# Patient Record
Sex: FEMALE | Race: WHITE | NOT HISPANIC OR LATINO | Employment: FULL TIME | URBAN - METROPOLITAN AREA
[De-identification: names, ages, dates, MRNs, and addresses within clinical notes are randomized per-mention and may not be internally consistent; named-entity substitution may affect disease eponyms.]

---

## 2017-01-06 ENCOUNTER — PATIENT MESSAGE (OUTPATIENT)
Dept: INTERNAL MEDICINE | Facility: CLINIC | Age: 42
End: 2017-01-06

## 2017-01-06 RX ORDER — SULFAMETHOXAZOLE AND TRIMETHOPRIM 800; 160 MG/1; MG/1
1 TABLET ORAL 2 TIMES DAILY
Qty: 14 TABLET | Refills: 0 | Status: SHIPPED | OUTPATIENT
Start: 2017-01-06 | End: 2017-02-06 | Stop reason: SDUPTHER

## 2017-01-06 NOTE — TELEPHONE ENCOUNTER
Called and spoke with the patient. She states it burns when she uses the rest room and also frequency.

## 2017-01-13 RX ORDER — SUMATRIPTAN SUCCINATE 100 MG/1
100 TABLET ORAL
Qty: 20 TABLET | Refills: 3 | Status: SHIPPED | OUTPATIENT
Start: 2017-01-13 | End: 2017-04-04 | Stop reason: SDUPTHER

## 2017-01-13 RX ORDER — OXYCODONE AND ACETAMINOPHEN 10; 325 MG/1; MG/1
1 TABLET ORAL EVERY 6 HOURS PRN
Qty: 60 TABLET | Refills: 0 | Status: SHIPPED | OUTPATIENT
Start: 2017-01-13 | End: 2017-02-08 | Stop reason: SDUPTHER

## 2017-01-17 RX ORDER — ESTRADIOL 2 MG/1
2 TABLET ORAL DAILY
Qty: 90 TABLET | Refills: 4 | Status: SHIPPED | OUTPATIENT
Start: 2017-01-17 | End: 2018-01-24 | Stop reason: SDUPTHER

## 2017-01-17 RX ORDER — IBUPROFEN 800 MG/1
TABLET ORAL
Qty: 90 TABLET | Refills: 0 | Status: SHIPPED | OUTPATIENT
Start: 2017-01-17 | End: 2017-02-14 | Stop reason: SDUPTHER

## 2017-01-26 RX ORDER — PROMETHAZINE HYDROCHLORIDE 25 MG/1
TABLET ORAL
Qty: 30 TABLET | Refills: 3 | Status: SHIPPED | OUTPATIENT
Start: 2017-01-26 | End: 2017-08-29 | Stop reason: SDUPTHER

## 2017-02-06 RX ORDER — SULFAMETHOXAZOLE AND TRIMETHOPRIM 800; 160 MG/1; MG/1
TABLET ORAL
Qty: 14 TABLET | Refills: 0 | Status: SHIPPED | OUTPATIENT
Start: 2017-02-06 | End: 2017-03-24 | Stop reason: SDUPTHER

## 2017-02-08 RX ORDER — OXYCODONE AND ACETAMINOPHEN 10; 325 MG/1; MG/1
1 TABLET ORAL EVERY 6 HOURS PRN
Qty: 60 TABLET | Refills: 0 | Status: SHIPPED | OUTPATIENT
Start: 2017-02-08 | End: 2017-03-07 | Stop reason: SDUPTHER

## 2017-02-08 RX ORDER — BUTALBITAL, ACETAMINOPHEN AND CAFFEINE 50; 325; 40 MG/1; MG/1; MG/1
1 TABLET ORAL EVERY 6 HOURS PRN
Qty: 90 TABLET | Refills: 0 | Status: SHIPPED | OUTPATIENT
Start: 2017-02-08 | End: 2018-02-26 | Stop reason: SDUPTHER

## 2017-02-09 ENCOUNTER — PATIENT MESSAGE (OUTPATIENT)
Dept: INTERNAL MEDICINE | Facility: CLINIC | Age: 42
End: 2017-02-09

## 2017-02-09 ENCOUNTER — TELEPHONE (OUTPATIENT)
Dept: INTERNAL MEDICINE | Facility: CLINIC | Age: 42
End: 2017-02-09

## 2017-02-09 NOTE — TELEPHONE ENCOUNTER
Called and advised the patient the Rx was ready for  and there was no answer lmom. Also sent a message to the portal/

## 2017-02-14 RX ORDER — IBUPROFEN 800 MG/1
800 TABLET ORAL 3 TIMES DAILY
Qty: 90 TABLET | Refills: 3 | Status: SHIPPED | OUTPATIENT
Start: 2017-02-14 | End: 2017-06-10 | Stop reason: SDUPTHER

## 2017-02-14 RX ORDER — PROPRANOLOL HYDROCHLORIDE 80 MG/1
80 TABLET ORAL DAILY
Qty: 90 TABLET | Refills: 4 | Status: SHIPPED | OUTPATIENT
Start: 2017-02-14 | End: 2017-05-03 | Stop reason: SDUPTHER

## 2017-02-14 RX ORDER — OMEPRAZOLE 40 MG/1
40 CAPSULE, DELAYED RELEASE ORAL EVERY MORNING
Qty: 90 CAPSULE | Refills: 4 | Status: SHIPPED | OUTPATIENT
Start: 2017-02-14 | End: 2017-05-03 | Stop reason: SDUPTHER

## 2017-03-07 RX ORDER — PROMETHAZINE HYDROCHLORIDE 25 MG/1
25 SUPPOSITORY RECTAL EVERY 6 HOURS PRN
Qty: 30 SUPPOSITORY | Refills: 2 | Status: SHIPPED | OUTPATIENT
Start: 2017-03-07 | End: 2018-01-31 | Stop reason: SDUPTHER

## 2017-03-07 RX ORDER — OXYCODONE AND ACETAMINOPHEN 10; 325 MG/1; MG/1
1 TABLET ORAL EVERY 6 HOURS PRN
Qty: 60 TABLET | Refills: 0 | Status: SHIPPED | OUTPATIENT
Start: 2017-03-07 | End: 2017-04-04 | Stop reason: SDUPTHER

## 2017-03-07 RX ORDER — CLONAZEPAM 1 MG/1
1 TABLET ORAL 3 TIMES DAILY
Qty: 90 TABLET | Refills: 2 | Status: SHIPPED | OUTPATIENT
Start: 2017-03-07 | End: 2017-05-03 | Stop reason: SDUPTHER

## 2017-03-08 RX ORDER — BUTALBITAL, ACETAMINOPHEN AND CAFFEINE 50; 325; 40 MG/1; MG/1; MG/1
TABLET ORAL
Qty: 90 TABLET | Refills: 0 | Status: SHIPPED | OUTPATIENT
Start: 2017-03-08 | End: 2017-04-25 | Stop reason: SDUPTHER

## 2017-03-17 ENCOUNTER — TELEPHONE (OUTPATIENT)
Dept: INTERNAL MEDICINE | Facility: CLINIC | Age: 42
End: 2017-03-17

## 2017-03-17 NOTE — TELEPHONE ENCOUNTER
----- Message from Annelise Morel MA sent at 3/16/2017  3:16 PM CDT -----  Prior auth requested on butalbital/acetaminophen/caff tabs #90    Insurance phone   Id# [id number]

## 2017-03-24 RX ORDER — SULFAMETHOXAZOLE AND TRIMETHOPRIM 800; 160 MG/1; MG/1
TABLET ORAL
Qty: 14 TABLET | Refills: 0 | Status: SHIPPED | OUTPATIENT
Start: 2017-03-24 | End: 2017-05-03 | Stop reason: ALTCHOICE

## 2017-04-04 RX ORDER — OXYCODONE AND ACETAMINOPHEN 10; 325 MG/1; MG/1
1 TABLET ORAL EVERY 6 HOURS PRN
Qty: 60 TABLET | Refills: 0 | Status: SHIPPED | OUTPATIENT
Start: 2017-04-04 | End: 2017-05-03 | Stop reason: SDUPTHER

## 2017-04-04 RX ORDER — SUMATRIPTAN SUCCINATE 100 MG/1
100 TABLET ORAL
Qty: 20 TABLET | Refills: 3 | Status: SHIPPED | OUTPATIENT
Start: 2017-04-04 | End: 2017-05-03 | Stop reason: SDUPTHER

## 2017-04-07 RX ORDER — FUROSEMIDE 20 MG/1
20 TABLET ORAL DAILY PRN
Qty: 30 TABLET | Refills: 3 | Status: SHIPPED | OUTPATIENT
Start: 2017-04-07 | End: 2018-04-24 | Stop reason: SDUPTHER

## 2017-04-07 RX ORDER — FUROSEMIDE 20 MG/1
20 TABLET ORAL DAILY PRN
Qty: 30 TABLET | Refills: 3 | Status: SHIPPED | OUTPATIENT
Start: 2017-04-07 | End: 2017-04-07 | Stop reason: SDUPTHER

## 2017-04-26 RX ORDER — BUTALBITAL, ACETAMINOPHEN AND CAFFEINE 50; 325; 40 MG/1; MG/1; MG/1
TABLET ORAL
Qty: 90 TABLET | Refills: 0 | Status: SHIPPED | OUTPATIENT
Start: 2017-04-26 | End: 2017-05-23 | Stop reason: SDUPTHER

## 2017-05-03 ENCOUNTER — OFFICE VISIT (OUTPATIENT)
Dept: INTERNAL MEDICINE | Facility: CLINIC | Age: 42
End: 2017-05-03
Payer: MEDICAID

## 2017-05-03 VITALS
HEIGHT: 62 IN | WEIGHT: 171.31 LBS | TEMPERATURE: 98 F | HEART RATE: 92 BPM | BODY MASS INDEX: 31.52 KG/M2 | SYSTOLIC BLOOD PRESSURE: 120 MMHG | DIASTOLIC BLOOD PRESSURE: 80 MMHG

## 2017-05-03 DIAGNOSIS — F31.9 BIPOLAR AFFECTIVE DISORDER, REMISSION STATUS UNSPECIFIED: ICD-10-CM

## 2017-05-03 DIAGNOSIS — E78.5 HYPERLIPIDEMIA, UNSPECIFIED HYPERLIPIDEMIA TYPE: Primary | ICD-10-CM

## 2017-05-03 DIAGNOSIS — G43.009 NONINTRACTABLE MIGRAINE, UNSPECIFIED MIGRAINE TYPE: ICD-10-CM

## 2017-05-03 PROCEDURE — 99213 OFFICE O/P EST LOW 20 MIN: CPT | Mod: S$PBB,,, | Performed by: INTERNAL MEDICINE

## 2017-05-03 PROCEDURE — 99999 PR PBB SHADOW E&M-EST. PATIENT-LVL III: CPT | Mod: PBBFAC,,, | Performed by: INTERNAL MEDICINE

## 2017-05-03 PROCEDURE — 99213 OFFICE O/P EST LOW 20 MIN: CPT | Mod: PBBFAC,PO | Performed by: INTERNAL MEDICINE

## 2017-05-03 RX ORDER — PROPRANOLOL HYDROCHLORIDE 80 MG/1
80 TABLET ORAL DAILY
Qty: 90 TABLET | Refills: 4 | Status: SHIPPED | OUTPATIENT
Start: 2017-05-03 | End: 2018-01-31 | Stop reason: SDUPTHER

## 2017-05-03 RX ORDER — QUETIAPINE FUMARATE 50 MG/1
TABLET, FILM COATED ORAL
COMMUNITY
Start: 2017-04-12 | End: 2017-09-05 | Stop reason: SDUPTHER

## 2017-05-03 RX ORDER — CLONAZEPAM 1 MG/1
1 TABLET ORAL 3 TIMES DAILY
Qty: 90 TABLET | Refills: 2 | Status: SHIPPED | OUTPATIENT
Start: 2017-05-03 | End: 2017-09-07 | Stop reason: SDUPTHER

## 2017-05-03 RX ORDER — SUMATRIPTAN SUCCINATE 100 MG/1
100 TABLET ORAL
Qty: 20 TABLET | Refills: 3 | Status: SHIPPED | OUTPATIENT
Start: 2017-05-03 | End: 2018-01-31 | Stop reason: SDUPTHER

## 2017-05-03 RX ORDER — FLUOXETINE HYDROCHLORIDE 20 MG/1
CAPSULE ORAL
COMMUNITY
Start: 2017-04-12 | End: 2017-08-09 | Stop reason: SDUPTHER

## 2017-05-03 RX ORDER — OXYCODONE AND ACETAMINOPHEN 10; 325 MG/1; MG/1
1 TABLET ORAL EVERY 6 HOURS PRN
Qty: 60 TABLET | Refills: 0 | Status: SHIPPED | OUTPATIENT
Start: 2017-05-03 | End: 2017-06-01 | Stop reason: SDUPTHER

## 2017-05-03 RX ORDER — TIZANIDINE 4 MG/1
TABLET ORAL
COMMUNITY
Start: 2017-04-24 | End: 2017-09-21 | Stop reason: SDUPTHER

## 2017-05-03 RX ORDER — HYDROCODONE BITARTRATE AND ACETAMINOPHEN 10; 325 MG/1; MG/1
TABLET ORAL
COMMUNITY
Start: 2017-04-05 | End: 2018-01-31

## 2017-05-03 RX ORDER — OMEPRAZOLE 40 MG/1
40 CAPSULE, DELAYED RELEASE ORAL EVERY MORNING
Qty: 90 CAPSULE | Refills: 4 | Status: SHIPPED | OUTPATIENT
Start: 2017-05-03 | End: 2018-01-31 | Stop reason: SDUPTHER

## 2017-05-03 RX ORDER — QUETIAPINE FUMARATE 400 MG/1
TABLET, FILM COATED ORAL
COMMUNITY
Start: 2017-04-12 | End: 2017-06-06 | Stop reason: SDUPTHER

## 2017-05-03 RX ORDER — DESONIDE 0.5 MG/G
1 CREAM TOPICAL 2 TIMES DAILY
Refills: 1 | COMMUNITY
Start: 2017-02-25 | End: 2023-03-02

## 2017-05-03 NOTE — MR AVS SNAPSHOT
[facility]   [facility]  [place] [address]  Phone: [phone]  Fax: [phone]                  Esther Gage   5/3/2017 1:40 PM   Office Visit    Description:  Female : 1975   Provider:  Rama Lee MD   Department:  [facility]           Reason for Visit     Follow-up           Diagnoses this Visit        Comments    Hyperlipidemia, unspecified hyperlipidemia type    -  Primary     Bipolar affective disorder, remission status unspecified         Nonintractable migraine, unspecified migraine type                To Do List           Goals (5 Years of Data)     None      Follow-Up and Disposition     Return in about 3 months (around 8/3/2017).       These Medications        Disp Refills Start End    oxycodone-acetaminophen (PERCOCET)  mg per tablet 60 tablet 0 5/3/2017     Take 1 tablet by mouth every 6 (six) hours as needed for Pain. - Oral    Pharmacy: [facility] [address] Ph #: [phone]       propranolol (INDERAL) 80 MG tablet 90 tablet 4 5/3/2017     Take 1 tablet (80 mg total) by mouth once daily. - Oral    Pharmacy: [facility] [address] Ph #: [phone]       omeprazole (PRILOSEC) 40 MG capsule 90 capsule 4 5/3/2017     Take 1 capsule (40 mg total) by mouth every morning. - Oral    Pharmacy: [facility] [address] Ph #: [phone]       sumatriptan (IMITREX) 100 MG tablet 20 tablet 3 5/3/2017     Take 1 tablet (100 mg total) by mouth every 2 (two) hours as needed for Migraine (Maximum of 2 doses in a 24 hour period.). - Oral    Pharmacy: [facility] [address] Ph #: [phone]       clonazePAM (KLONOPIN) 1 MG tablet 90 tablet 2 5/3/2017     Take 1  tablet (1 mg total) by mouth 3 (three) times daily. - Oral    Pharmacy: [facility] [address] [place] [address] #: [phone]         [facility] On [facility]     [facility] Nurse Care Line - 24/7 Assistance  Unless otherwise directed by your provider, please contact [facility], our nurse care line that is available for 24/7 assistance.     Registered nurses in the [facility] provide: appointment scheduling, clinical advisement, health education, and other advisory services.  Call: [phone] (toll free)               Medications           Message regarding Medications     Verify the changes and/or additions to your medication regime listed below are the same as discussed with your clinician today.  If any of these changes or additions are incorrect, please notify your healthcare provider.        STOP taking these medications     sulfamethoxazole-trimethoprim 800-160mg (BACTRIM DS) 800-160 mg Tab TAKE 1 TABLET BY MOUTH TWICE DAILY           Verify that the below list of medications is an accurate representation of the medications you are currently taking.  If none reported, the list may be blank. If incorrect, please contact your healthcare provider. Carry this list with you in case of emergency.           Current Medications     butalbital-acetaminophen-caffeine -40 mg (FIORICET, ESGIC) -40 mg per tablet Take 1 tablet by mouth every 6 (six) hours as needed.    butalbital-acetaminophen-caffeine -40 mg (FIORICET, ESGIC) -40 mg per tablet Take 1 tablet by mouth every 6 (six) hours as needed.    butalbital-acetaminophen-caffeine -40 mg (FIORICET, ESGIC) -40 mg per tablet TAKE 1 TABLET BY MOUTH EVERY 6 HOURS AS NEEDED    clonazePAM (KLONOPIN) 1 MG tablet Take 1 tablet (1 mg total) by mouth 3 (three) times daily.    desonide (DESOWEN) 0.05 % cream 1 application 2 (two) times daily. Apply to affected area    estradiol  "(ESTRACE) 2 MG tablet Take 1 tablet (2 mg total) by mouth once daily.    fluoxetine (PROZAC) 20 MG capsule     furosemide (LASIX) 20 MG tablet Take 1 tablet (20 mg total) by mouth daily as needed.    hydrocodone-acetaminophen 10-325mg (NORCO)  mg Tab     hydrOXYzine HCl (ATARAX) 25 MG tablet Take 1 tablet (25 mg total) by mouth 3 (three) times daily as needed for Itching.    ibuprofen (ADVIL,MOTRIN) 800 MG tablet Take 1 tablet (800 mg total) by mouth 3 (three) times daily.    lamotrigine (LAMICTAL) 200 MG tablet Take 1 tablet (200 mg total) by mouth 2 (two) times daily.    omeprazole (PRILOSEC) 40 MG capsule Take 1 capsule (40 mg total) by mouth every morning.    oxybutynin (DITROPAN) 5 MG Tab Take 1 tablet (5 mg total) by mouth 3 (three) times daily.    oxycodone-acetaminophen (PERCOCET)  mg per tablet Take 1 tablet by mouth every 6 (six) hours as needed for Pain.    pentosan polysulfate (ELMIRON) 100 mg Cap Take 1 capsule (100 mg total) by mouth 3 (three) times daily.    promethazine (PHENERGAN) 25 MG suppository Place 1 suppository (25 mg total) rectally every 6 (six) hours as needed for Nausea.    promethazine (PHENERGAN) 25 MG tablet TAKE 1 TABLET(25 MG) BY MOUTH EVERY 6 HOURS AS NEEDED FOR NAUSEA    propranolol (INDERAL) 80 MG tablet Take 1 tablet (80 mg total) by mouth once daily.    quetiapine (SEROQUEL) 400 MG tablet     quetiapine (SEROQUEL) 50 MG tablet     sumatriptan (IMITREX) 100 MG tablet Take 1 tablet (100 mg total) by mouth every 2 (two) hours as needed for Migraine (Maximum of 2 doses in a 24 hour period.).    tizanidine (ZANAFLEX) 4 MG tablet     zolpidem (AMBIEN) 10 mg Tab TAKE ONE TABLET BY MOUTH AT BEDTIME           Clinical Reference Information           Your Vitals Were     BP Pulse Temp Height Weight BMI    120/80 (BP Location: Left arm, Patient Position: Sitting, BP Method: Manual) 92 98.3 °F (36.8 °C) (Oral) 5' 2" (1.575 m) 77.7 kg (171 lb 4.8 oz) 31.33 kg/m2      Blood " Pressure          Most Recent Value    BP  120/80      Allergies as of 5/3/2017     No Known Allergies      Immunizations Administered on Date of Encounter - 5/3/2017     None      Orders Placed During Today's Visit     Future Labs/Procedures Expected by Expires    CBC auto differential  5/3/2017 7/2/2018    Comprehensive metabolic panel  5/3/2017 7/2/2018    Lipid panel  5/3/2017 5/3/2018    TSH  5/3/2017 7/2/2018      Language Assistance Services     ATTENTION: Language assistance services are available, free of charge. Please call [phone].      ATENCIÓN: Si habla español, tiene a brown disposición servicios gratuitos de asistencia lingüística. Llame al [phone].     CHÚ Ý: N?u b?n nói Ti?ng Vi?t, có các d?ch v? h? tr? ngôn ng? mi?n phí dành cho b?n. G?i s? [phone].         [facility] complies with applicable Federal civil rights laws and does not discriminate on the basis of race, color, national origin, age, disability, or sex.

## 2017-05-03 NOTE — PROGRESS NOTES
CC: followup of hyperlipidemia  HPI:  The patient is a 41 y.o. year old female who presents to the office for followup of hyperlipidemia.  The patient denies any chest pain, shortness of breath, blurred vision or excessive fatigue, but does complain of headache, nausea and vomiting.  She has been experiencing a lot of depression and anxiety since her brother .  She has been unable to work due to grief, and has difficulty focusing.  She is being seen by psychiatry.    PAST MEDICAL HISTORY:  Past Medical History:   Diagnosis Date    Allergy     Asthma     childhood    Bipolar disorder     Gestational diabetes mellitus     resolved    Hyperlipidemia     Interstitial cystitis     Migraine headache        SURGICAL HISTORY:  Past Surgical History:   Procedure Laterality Date    ADENOIDECTOMY      bladder lift      CHOLECYSTECTOMY      HYSTERECTOMY      unilateral oophrectomy    OVARIAN CYST REMOVAL      SINUS SURGERY      TONSILLECTOMY         MEDS:  Medcard reviewed and updated    ALLERGIES: Allergy Card reviewed and updated    SOCIAL HISTORY:   The patient is a nonsmoker.    PE:   APPEARANCE: Well nourished, well developed, in no acute distress.    CHEST: Lungs clear to auscultation with unlabored respirations.  CARDIOVASCULAR: Normal S1, S2. No murmurs. No carotid bruits. No pedal edema.  ABDOMEN: Bowel sounds normal. Not distended. Soft. No tenderness or masses.   PSYCHIATRIC: The patient is oriented to person, place, and time and has a pleasant affect.        ASSESSMENT/PLAN:  Esther was seen today for follow-up.    Diagnoses and all orders for this visit:    Hyperlipidemia, unspecified hyperlipidemia type  -     CBC auto differential; Future  -     Comprehensive metabolic panel; Future  -     Lipid panel; Future  -     TSH; Future    Bipolar affective disorder, remission status unspecified  -     Followed by psychiatry    Nonintractable migraine, unspecified migraine type  -     Refill  Percocet    Other orders  -     oxycodone-acetaminophen (PERCOCET)  mg per tablet; Take 1 tablet by mouth every 6 (six) hours as needed for Pain.  -     propranolol (INDERAL) 80 MG tablet; Take 1 tablet (80 mg total) by mouth once daily.  -     omeprazole (PRILOSEC) 40 MG capsule; Take 1 capsule (40 mg total) by mouth every morning.  -     sumatriptan (IMITREX) 100 MG tablet; Take 1 tablet (100 mg total) by mouth every 2 (two) hours as needed for Migraine (Maximum of 2 doses in a 24 hour period.).  -     clonazePAM (KLONOPIN) 1 MG tablet; Take 1 tablet (1 mg total) by mouth 3 (three) times daily.

## 2017-05-23 RX ORDER — BUTALBITAL, ACETAMINOPHEN AND CAFFEINE 50; 325; 40 MG/1; MG/1; MG/1
1 TABLET ORAL EVERY 6 HOURS PRN
Qty: 90 TABLET | Refills: 0 | Status: SHIPPED | OUTPATIENT
Start: 2017-05-23 | End: 2017-06-21 | Stop reason: SDUPTHER

## 2017-06-01 DIAGNOSIS — Z00.00 ROUTINE GENERAL MEDICAL EXAMINATION AT A HEALTH CARE FACILITY: Primary | ICD-10-CM

## 2017-06-01 RX ORDER — OXYCODONE AND ACETAMINOPHEN 10; 325 MG/1; MG/1
1 TABLET ORAL EVERY 6 HOURS PRN
Qty: 60 TABLET | Refills: 0 | Status: SHIPPED | OUTPATIENT
Start: 2017-06-01 | End: 2017-07-05 | Stop reason: SDUPTHER

## 2017-06-02 ENCOUNTER — PATIENT MESSAGE (OUTPATIENT)
Dept: INTERNAL MEDICINE | Facility: CLINIC | Age: 42
End: 2017-06-02

## 2017-06-06 ENCOUNTER — PATIENT MESSAGE (OUTPATIENT)
Dept: INTERNAL MEDICINE | Facility: CLINIC | Age: 42
End: 2017-06-06

## 2017-06-06 RX ORDER — ZOLPIDEM TARTRATE 10 MG/1
TABLET ORAL
Qty: 30 TABLET | Refills: 3 | Status: SHIPPED | OUTPATIENT
Start: 2017-06-06 | End: 2017-10-05 | Stop reason: SDUPTHER

## 2017-06-06 RX ORDER — QUETIAPINE FUMARATE 400 MG/1
400 TABLET, FILM COATED ORAL DAILY
Qty: 30 TABLET | Refills: 3 | Status: SHIPPED | OUTPATIENT
Start: 2017-06-06 | End: 2017-10-06 | Stop reason: SDUPTHER

## 2017-06-12 RX ORDER — IBUPROFEN 800 MG/1
TABLET ORAL
Qty: 90 TABLET | Refills: 0 | Status: SHIPPED | OUTPATIENT
Start: 2017-06-12 | End: 2017-07-12 | Stop reason: SDUPTHER

## 2017-06-13 ENCOUNTER — PATIENT MESSAGE (OUTPATIENT)
Dept: INTERNAL MEDICINE | Facility: CLINIC | Age: 42
End: 2017-06-13

## 2017-06-13 NOTE — TELEPHONE ENCOUNTER
Patient asking for albuterol rx. Don't see you have ever given but she has old one from 2014 and requesting another.    rx ok?  Pharmacy correct.    Thanks judy

## 2017-06-16 RX ORDER — ALBUTEROL SULFATE 90 UG/1
2 AEROSOL, METERED RESPIRATORY (INHALATION) EVERY 6 HOURS PRN
Qty: 18 G | Refills: 3 | Status: SHIPPED | OUTPATIENT
Start: 2017-06-16 | End: 2018-08-16 | Stop reason: SDUPTHER

## 2017-06-21 RX ORDER — BUTALBITAL, ACETAMINOPHEN AND CAFFEINE 50; 325; 40 MG/1; MG/1; MG/1
TABLET ORAL
Qty: 90 TABLET | Refills: 0 | Status: SHIPPED | OUTPATIENT
Start: 2017-06-21 | End: 2017-07-28 | Stop reason: SDUPTHER

## 2017-07-04 ENCOUNTER — PATIENT MESSAGE (OUTPATIENT)
Dept: INTERNAL MEDICINE | Facility: CLINIC | Age: 42
End: 2017-07-04

## 2017-07-05 RX ORDER — OXYCODONE AND ACETAMINOPHEN 10; 325 MG/1; MG/1
1 TABLET ORAL EVERY 6 HOURS PRN
Qty: 60 TABLET | Refills: 0 | Status: SHIPPED | OUTPATIENT
Start: 2017-07-05 | End: 2017-08-09 | Stop reason: SDUPTHER

## 2017-07-06 ENCOUNTER — PATIENT MESSAGE (OUTPATIENT)
Dept: INTERNAL MEDICINE | Facility: CLINIC | Age: 42
End: 2017-07-06

## 2017-07-06 RX ORDER — LAMOTRIGINE 200 MG/1
200 TABLET ORAL 2 TIMES DAILY
Qty: 180 TABLET | Refills: 3 | Status: SHIPPED | OUTPATIENT
Start: 2017-07-06 | End: 2018-06-29 | Stop reason: SDUPTHER

## 2017-07-07 ENCOUNTER — PATIENT MESSAGE (OUTPATIENT)
Dept: INTERNAL MEDICINE | Facility: CLINIC | Age: 42
End: 2017-07-07

## 2017-07-10 RX ORDER — BUSPIRONE HYDROCHLORIDE 10 MG/1
10 TABLET ORAL 3 TIMES DAILY
Qty: 90 TABLET | Refills: 11 | Status: SHIPPED | OUTPATIENT
Start: 2017-07-10 | End: 2018-02-26

## 2017-07-12 RX ORDER — OXYBUTYNIN CHLORIDE 5 MG/1
TABLET ORAL
Qty: 270 TABLET | Refills: 0 | Status: SHIPPED | OUTPATIENT
Start: 2017-07-12 | End: 2017-11-02 | Stop reason: SDUPTHER

## 2017-07-12 RX ORDER — IBUPROFEN 800 MG/1
TABLET ORAL
Qty: 90 TABLET | Refills: 0 | Status: SHIPPED | OUTPATIENT
Start: 2017-07-12 | End: 2017-08-09 | Stop reason: SDUPTHER

## 2017-07-28 RX ORDER — BUTALBITAL, ACETAMINOPHEN AND CAFFEINE 50; 325; 40 MG/1; MG/1; MG/1
TABLET ORAL
Qty: 90 TABLET | Refills: 0 | Status: SHIPPED | OUTPATIENT
Start: 2017-07-28 | End: 2017-09-05 | Stop reason: SDUPTHER

## 2017-08-03 ENCOUNTER — TELEPHONE (OUTPATIENT)
Dept: INTERNAL MEDICINE | Facility: CLINIC | Age: 42
End: 2017-08-03

## 2017-08-03 NOTE — TELEPHONE ENCOUNTER
----- Message from Kenisha Denis sent at 8/2/2017  3:40 PM CDT -----  Contact: fygaviota  Doctor appointment and lab have been scheduled.  Please link lab orders to the lab appointment.  Date of doctor appointment:  08/07/17  Physical or EP:  ep  Date of lab appointment:  08/03/17  Comments: Pt had the appt on Monday but did not call for lab appt until today and tomorrow is the only day she can come for labs. I know it's late but can you please link orders?

## 2017-08-09 ENCOUNTER — PATIENT MESSAGE (OUTPATIENT)
Dept: INTERNAL MEDICINE | Facility: CLINIC | Age: 42
End: 2017-08-09

## 2017-08-09 RX ORDER — FLUOXETINE HYDROCHLORIDE 20 MG/1
20 CAPSULE ORAL DAILY
Qty: 30 CAPSULE | Refills: 3 | Status: SHIPPED | OUTPATIENT
Start: 2017-08-09 | End: 2018-02-26 | Stop reason: DRUGHIGH

## 2017-08-09 RX ORDER — IBUPROFEN 800 MG/1
TABLET ORAL
Qty: 90 TABLET | Refills: 0 | Status: SHIPPED | OUTPATIENT
Start: 2017-08-09 | End: 2017-09-07 | Stop reason: SDUPTHER

## 2017-08-09 RX ORDER — FLUOXETINE HYDROCHLORIDE 20 MG/1
20 CAPSULE ORAL DAILY
Qty: 30 CAPSULE | Refills: 6 | Status: SHIPPED | OUTPATIENT
Start: 2017-08-09 | End: 2018-02-26 | Stop reason: SDUPTHER

## 2017-08-09 RX ORDER — OXYCODONE AND ACETAMINOPHEN 10; 325 MG/1; MG/1
1 TABLET ORAL EVERY 6 HOURS PRN
Qty: 60 TABLET | Refills: 0 | Status: SHIPPED | OUTPATIENT
Start: 2017-08-09 | End: 2017-09-05 | Stop reason: SDUPTHER

## 2017-08-09 RX ORDER — OXYCODONE AND ACETAMINOPHEN 10; 325 MG/1; MG/1
1 TABLET ORAL EVERY 6 HOURS PRN
Qty: 60 TABLET | Refills: 0 | Status: SHIPPED | OUTPATIENT
Start: 2017-08-09 | End: 2017-08-09 | Stop reason: SDUPTHER

## 2017-08-29 RX ORDER — SULFAMETHOXAZOLE AND TRIMETHOPRIM 800; 160 MG/1; MG/1
TABLET ORAL
Qty: 10 TABLET | Refills: 0 | Status: SHIPPED | OUTPATIENT
Start: 2017-08-29 | End: 2018-02-13 | Stop reason: SDUPTHER

## 2017-08-29 RX ORDER — PROMETHAZINE HYDROCHLORIDE 25 MG/1
TABLET ORAL
Qty: 30 TABLET | Refills: 0 | Status: SHIPPED | OUTPATIENT
Start: 2017-08-29 | End: 2017-09-20 | Stop reason: SDUPTHER

## 2017-09-05 ENCOUNTER — PATIENT MESSAGE (OUTPATIENT)
Dept: INTERNAL MEDICINE | Facility: CLINIC | Age: 42
End: 2017-09-05

## 2017-09-06 DIAGNOSIS — Z12.31 OTHER SCREENING MAMMOGRAM: Primary | ICD-10-CM

## 2017-09-06 RX ORDER — OXYCODONE AND ACETAMINOPHEN 10; 325 MG/1; MG/1
1 TABLET ORAL EVERY 6 HOURS PRN
Qty: 60 TABLET | Refills: 0 | Status: SHIPPED | OUTPATIENT
Start: 2017-09-06 | End: 2017-09-06 | Stop reason: SDUPTHER

## 2017-09-06 RX ORDER — BUTALBITAL, ACETAMINOPHEN AND CAFFEINE 50; 325; 40 MG/1; MG/1; MG/1
1 TABLET ORAL EVERY 6 HOURS PRN
Qty: 90 TABLET | Refills: 0 | Status: SHIPPED | OUTPATIENT
Start: 2017-09-06 | End: 2017-10-05 | Stop reason: SDUPTHER

## 2017-09-06 RX ORDER — HYDROCODONE BITARTRATE AND ACETAMINOPHEN 10; 325 MG/1; MG/1
TABLET ORAL
OUTPATIENT
Start: 2017-09-06

## 2017-09-06 RX ORDER — HYDROXYZINE HYDROCHLORIDE 25 MG/1
25 TABLET, FILM COATED ORAL 3 TIMES DAILY PRN
Qty: 60 TABLET | Refills: 3 | Status: SHIPPED | OUTPATIENT
Start: 2017-09-06 | End: 2018-05-01 | Stop reason: SDUPTHER

## 2017-09-06 RX ORDER — OXYCODONE AND ACETAMINOPHEN 10; 325 MG/1; MG/1
1 TABLET ORAL EVERY 6 HOURS PRN
Qty: 120 TABLET | Refills: 0 | Status: SHIPPED | OUTPATIENT
Start: 2017-09-06 | End: 2017-10-05 | Stop reason: SDUPTHER

## 2017-09-06 RX ORDER — QUETIAPINE FUMARATE 50 MG/1
100 TABLET, FILM COATED ORAL 2 TIMES DAILY
Qty: 120 TABLET | Refills: 0 | Status: SHIPPED | OUTPATIENT
Start: 2017-09-06 | End: 2017-10-06

## 2017-09-06 NOTE — TELEPHONE ENCOUNTER
Spoke with the patient and scheduled the appointment./Labs and Mammogram. Patient states she will come on another day to get the other labs completed Termed the call

## 2017-09-07 RX ORDER — CLONAZEPAM 1 MG/1
TABLET ORAL
Qty: 90 TABLET | Refills: 0 | Status: SHIPPED | OUTPATIENT
Start: 2017-09-07 | End: 2017-09-07 | Stop reason: SDUPTHER

## 2017-09-07 RX ORDER — CLONAZEPAM 1 MG/1
1 TABLET ORAL 3 TIMES DAILY
Qty: 90 TABLET | Refills: 0 | Status: SHIPPED | OUTPATIENT
Start: 2017-09-07 | End: 2017-10-05 | Stop reason: SDUPTHER

## 2017-09-07 RX ORDER — IBUPROFEN 800 MG/1
TABLET ORAL
Qty: 90 TABLET | Refills: 0 | Status: SHIPPED | OUTPATIENT
Start: 2017-09-07 | End: 2017-10-05 | Stop reason: SDUPTHER

## 2017-09-08 ENCOUNTER — HOSPITAL ENCOUNTER (OUTPATIENT)
Dept: RADIOLOGY | Facility: HOSPITAL | Age: 42
Discharge: HOME | End: 2017-09-08
Attending: INTERNAL MEDICINE
Payer: MEDICAID

## 2017-09-08 ENCOUNTER — TELEPHONE (OUTPATIENT)
Dept: OBSTETRICS AND GYNECOLOGY | Facility: CLINIC | Age: 42
End: 2017-09-08

## 2017-09-08 DIAGNOSIS — Z12.31 OTHER SCREENING MAMMOGRAM: ICD-10-CM

## 2017-09-08 PROCEDURE — 77063 BREAST TOMOSYNTHESIS BI: CPT | Mod: 26,,, | Performed by: RADIOLOGY

## 2017-09-08 PROCEDURE — 77067 SCR MAMMO BI INCL CAD: CPT | Mod: 26,,, | Performed by: RADIOLOGY

## 2017-09-08 PROCEDURE — 77067 SCR MAMMO BI INCL CAD: CPT | Mod: TC

## 2017-09-08 NOTE — TELEPHONE ENCOUNTER
----- Message from Olga Gonzalez MA sent at 9/6/2017  4:01 PM CDT -----  Contact: Self  Patient called and states she wants to have an appointment as soon as she has not been there in a while to have an exam. Patients Daughter Svetlana Gr also needs an appointment as well scheduled for the same day if possible as she lives on the [place] and she requested to have an appointment with Dr Infante/ Thanks  Olga Gonzalez MA to Dr Rama Lee

## 2017-09-08 NOTE — TELEPHONE ENCOUNTER
Patient states that she has not had an annual in a while and needs to be seen. Patient states that she has had a hysterectomy. Patient was scheduled for 3:00 on 10/31/2017

## 2017-09-15 ENCOUNTER — APPOINTMENT (OUTPATIENT)
Dept: LAB | Facility: HOSPITAL | Age: 42
End: 2017-09-15
Attending: UROLOGY
Payer: MEDICAID

## 2017-09-15 ENCOUNTER — LAB VISIT (OUTPATIENT)
Dept: LAB | Facility: HOSPITAL | Age: 42
End: 2017-09-15
Attending: INTERNAL MEDICINE
Payer: MEDICAID

## 2017-09-15 DIAGNOSIS — Z00.00 ROUTINE MEDICAL EXAM: ICD-10-CM

## 2017-09-15 PROCEDURE — 86703 HIV-1/HIV-2 1 RESULT ANTBDY: CPT

## 2017-09-15 PROCEDURE — 86592 SYPHILIS TEST NON-TREP QUAL: CPT

## 2017-09-15 PROCEDURE — 80074 ACUTE HEPATITIS PANEL: CPT

## 2017-09-15 PROCEDURE — 36415 COLL VENOUS BLD VENIPUNCTURE: CPT

## 2017-09-18 LAB
HAV IGM SERPL QL IA: NEGATIVE
HBV CORE IGM SERPL QL IA: NEGATIVE
HBV SURFACE AG SERPL QL IA: NEGATIVE
HCV AB SERPL QL IA: POSITIVE
HIV 1+2 AB+HIV1 P24 AG SERPL QL IA: NEGATIVE
RPR SER QL: NORMAL

## 2017-09-20 ENCOUNTER — PATIENT MESSAGE (OUTPATIENT)
Dept: INTERNAL MEDICINE | Facility: CLINIC | Age: 42
End: 2017-09-20

## 2017-09-20 RX ORDER — PROMETHAZINE HYDROCHLORIDE 25 MG/1
TABLET ORAL
Qty: 30 TABLET | Refills: 3 | Status: SHIPPED | OUTPATIENT
Start: 2017-09-20 | End: 2018-02-26 | Stop reason: SDUPTHER

## 2017-09-21 DIAGNOSIS — R76.8 POSITIVE HEPATITIS C ANTIBODY TEST: Primary | ICD-10-CM

## 2017-09-21 RX ORDER — TIZANIDINE 4 MG/1
4 TABLET ORAL EVERY 8 HOURS PRN
Qty: 60 TABLET | Refills: 1 | Status: SHIPPED | OUTPATIENT
Start: 2017-09-21 | End: 2017-12-05 | Stop reason: SDUPTHER

## 2017-09-21 NOTE — TELEPHONE ENCOUNTER
Please informed patient that labs are significant for positive hepatitis C antibody.  Please schedule hepatitis C genotype and viral load to confirm hepatitis C positivity.

## 2017-09-25 ENCOUNTER — PATIENT MESSAGE (OUTPATIENT)
Dept: INTERNAL MEDICINE | Facility: CLINIC | Age: 42
End: 2017-09-25

## 2017-09-25 ENCOUNTER — TELEPHONE (OUTPATIENT)
Dept: INTERNAL MEDICINE | Facility: CLINIC | Age: 42
End: 2017-09-25

## 2017-10-05 RX ORDER — CLONAZEPAM 1 MG/1
1 TABLET ORAL 3 TIMES DAILY
Qty: 90 TABLET | Refills: 0 | Status: SHIPPED | OUTPATIENT
Start: 2017-10-05 | End: 2017-10-05 | Stop reason: SDUPTHER

## 2017-10-05 RX ORDER — OXYCODONE AND ACETAMINOPHEN 10; 325 MG/1; MG/1
1 TABLET ORAL EVERY 6 HOURS PRN
Qty: 120 TABLET | Refills: 0 | Status: CANCELLED | OUTPATIENT
Start: 2017-10-05

## 2017-10-05 RX ORDER — OXYCODONE AND ACETAMINOPHEN 10; 325 MG/1; MG/1
1 TABLET ORAL EVERY 6 HOURS PRN
Qty: 120 TABLET | Refills: 0 | Status: SHIPPED | OUTPATIENT
Start: 2017-10-05 | End: 2017-10-30 | Stop reason: SDUPTHER

## 2017-10-06 ENCOUNTER — LAB VISIT (OUTPATIENT)
Dept: LAB | Facility: HOSPITAL | Age: 42
End: 2017-10-06
Attending: INTERNAL MEDICINE
Payer: MEDICAID

## 2017-10-06 DIAGNOSIS — Z00.00 ROUTINE MEDICAL EXAM: ICD-10-CM

## 2017-10-06 DIAGNOSIS — R76.8 POSITIVE HEPATITIS C ANTIBODY TEST: ICD-10-CM

## 2017-10-06 LAB — 25(OH)D3+25(OH)D2 SERPL-MCNC: 37 NG/ML

## 2017-10-06 PROCEDURE — 36415 COLL VENOUS BLD VENIPUNCTURE: CPT | Mod: PO

## 2017-10-06 PROCEDURE — 87522 HEPATITIS C REVRS TRNSCRPJ: CPT

## 2017-10-06 PROCEDURE — 87902 NFCT AGT GNTYP ALYS HEP C: CPT

## 2017-10-06 PROCEDURE — 82306 VITAMIN D 25 HYDROXY: CPT

## 2017-10-06 RX ORDER — ZOLPIDEM TARTRATE 10 MG/1
TABLET ORAL
Qty: 30 TABLET | Refills: 3 | Status: SHIPPED | OUTPATIENT
Start: 2017-10-06 | End: 2018-02-26 | Stop reason: SDUPTHER

## 2017-10-06 RX ORDER — BUTALBITAL, ACETAMINOPHEN AND CAFFEINE 50; 325; 40 MG/1; MG/1; MG/1
TABLET ORAL
Qty: 90 TABLET | Refills: 0 | Status: SHIPPED | OUTPATIENT
Start: 2017-10-06 | End: 2017-11-06 | Stop reason: SDUPTHER

## 2017-10-06 RX ORDER — IBUPROFEN 800 MG/1
TABLET ORAL
Qty: 90 TABLET | Refills: 0 | Status: SHIPPED | OUTPATIENT
Start: 2017-10-06 | End: 2017-11-06 | Stop reason: SDUPTHER

## 2017-10-06 RX ORDER — CLONAZEPAM 1 MG/1
TABLET ORAL
Qty: 90 TABLET | Refills: 3 | Status: SHIPPED | OUTPATIENT
Start: 2017-10-06 | End: 2017-12-28 | Stop reason: SDUPTHER

## 2017-10-06 RX ORDER — QUETIAPINE FUMARATE 400 MG/1
TABLET, FILM COATED ORAL
Qty: 30 TABLET | Refills: 0 | Status: SHIPPED | OUTPATIENT
Start: 2017-10-06 | End: 2017-11-06 | Stop reason: SDUPTHER

## 2017-10-10 LAB
HCV LOG: 4 LOG (10) IU/ML
HCV RNA QUANT PCR: ABNORMAL IU/ML
HCV, QUALITATIVE: DETECTED IU/ML

## 2017-10-11 ENCOUNTER — TELEPHONE (OUTPATIENT)
Dept: INTERNAL MEDICINE | Facility: CLINIC | Age: 42
End: 2017-10-11

## 2017-10-11 DIAGNOSIS — B19.20 HEPATITIS C VIRUS INFECTION WITHOUT HEPATIC COMA, UNSPECIFIED CHRONICITY: Primary | ICD-10-CM

## 2017-10-11 LAB — HCV GENTYP SERPL NAA+PROBE: NORMAL

## 2017-10-11 NOTE — TELEPHONE ENCOUNTER
Refer to hepatology.  Please inform patient that hepatitis C viral load is positive and confirm hepatitis C positivity.

## 2017-10-13 ENCOUNTER — PATIENT MESSAGE (OUTPATIENT)
Dept: INTERNAL MEDICINE | Facility: CLINIC | Age: 42
End: 2017-10-13

## 2017-10-15 ENCOUNTER — DOCUMENTATION ONLY (OUTPATIENT)
Dept: TRANSPLANT | Facility: CLINIC | Age: 42
End: 2017-10-15

## 2017-10-15 NOTE — LETTER
October 15, 2017    Rama Lee MD  [address] [facility] [address] [facility] [address]      Dear Dr. Lee    Patient: Esther Gage   MR Number: [id number]   YOB: 1975     Thank you for the referral of Esther Gage to the [facility]. An initial appointment will be scheduled for your patient with one of our Hepatologists.      Thank you again for your trust in our program.  If there is anything we can do for you or your staff, please feel free to contact us.        Sincerely,        [facility]  [address] [place] [id number]  [phone]

## 2017-10-15 NOTE — LETTER
October 15, 2017    Kyra Gage  [address] [facility] [address]      Dear Kyra Gage:    Your doctor has referred you to the [facility]. You will be contacted by our office and an initial appointment will then be scheduled for you.    We look forward to seeing you soon. If you have any further questions, please contact us at [phone].       Sincerely,        [facility]   [address] [place] [address]  [phone]

## 2017-10-16 ENCOUNTER — TELEPHONE (OUTPATIENT)
Dept: INTERNAL MEDICINE | Facility: CLINIC | Age: 42
End: 2017-10-16

## 2017-10-16 ENCOUNTER — PATIENT MESSAGE (OUTPATIENT)
Dept: INTERNAL MEDICINE | Facility: CLINIC | Age: 42
End: 2017-10-16

## 2017-10-16 NOTE — NURSING
Pt records reviewed.   Pt will be referred to Hepatitis C clinic    Initial referral received  from the workque.   Referring Provider/diagnosis  MAVERICK PIERRE Provider:   Diagnosis: Hepatitis C virus infection without hepatic coma, unspecified chronicity     HCV RNA +    Referral letter sent to provider and patient.

## 2017-10-16 NOTE — TELEPHONE ENCOUNTER
Called and spoke with the patient and advised that she has a RX already given on the 10/6/2017 and she is getting 400mg. Advised any adjustments needs to go through her Psych she understood and termed the call

## 2017-10-30 RX ORDER — OXYCODONE AND ACETAMINOPHEN 10; 325 MG/1; MG/1
1 TABLET ORAL EVERY 6 HOURS PRN
Qty: 120 TABLET | Refills: 0 | Status: SHIPPED | OUTPATIENT
Start: 2017-10-30 | End: 2017-11-29 | Stop reason: SDUPTHER

## 2017-10-31 ENCOUNTER — PATIENT MESSAGE (OUTPATIENT)
Dept: INTERNAL MEDICINE | Facility: CLINIC | Age: 42
End: 2017-10-31

## 2017-11-02 RX ORDER — OXYBUTYNIN CHLORIDE 5 MG/1
5 TABLET ORAL 3 TIMES DAILY
Qty: 270 TABLET | Refills: 3 | Status: SHIPPED | OUTPATIENT
Start: 2017-11-02 | End: 2019-01-08 | Stop reason: SDUPTHER

## 2017-11-06 ENCOUNTER — LAB VISIT (OUTPATIENT)
Dept: LAB | Facility: HOSPITAL | Age: 42
End: 2017-11-06
Attending: INTERNAL MEDICINE
Payer: MEDICAID

## 2017-11-06 DIAGNOSIS — E78.5 HYPERLIPIDEMIA, UNSPECIFIED HYPERLIPIDEMIA TYPE: ICD-10-CM

## 2017-11-06 LAB
ALBUMIN SERPL BCP-MCNC: 3.9 G/DL
ALP SERPL-CCNC: 107 U/L
ALT SERPL W/O P-5'-P-CCNC: 10 U/L
ANION GAP SERPL CALC-SCNC: 12 MMOL/L
AST SERPL-CCNC: 20 U/L
BASOPHILS # BLD AUTO: 0.07 K/UL
BASOPHILS NFR BLD: 0.9 %
BILIRUB SERPL-MCNC: 0.6 MG/DL
BUN SERPL-MCNC: 10 MG/DL
CALCIUM SERPL-MCNC: 9.4 MG/DL
CHLORIDE SERPL-SCNC: 105 MMOL/L
CHOLEST SERPL-MCNC: 220 MG/DL
CHOLEST/HDLC SERPL: 3.6 {RATIO}
CO2 SERPL-SCNC: 22 MMOL/L
CREAT SERPL-MCNC: 0.6 MG/DL
DIFFERENTIAL METHOD: ABNORMAL
EOSINOPHIL # BLD AUTO: 0.3 K/UL
EOSINOPHIL NFR BLD: 3.2 %
ERYTHROCYTE [DISTWIDTH] IN BLOOD BY AUTOMATED COUNT: 13.4 %
EST. GFR  (AFRICAN AMERICAN): >60 ML/MIN/1.73 M^2
EST. GFR  (NON AFRICAN AMERICAN): >60 ML/MIN/1.73 M^2
GLUCOSE SERPL-MCNC: 96 MG/DL
HCT VFR BLD AUTO: 31.5 %
HDLC SERPL-MCNC: 61 MG/DL
HDLC SERPL: 27.7 %
HGB BLD-MCNC: 10.8 G/DL
IMM GRANULOCYTES # BLD AUTO: 0.03 K/UL
IMM GRANULOCYTES NFR BLD AUTO: 0.4 %
LDLC SERPL CALC-MCNC: 132 MG/DL
LYMPHOCYTES # BLD AUTO: 1.5 K/UL
LYMPHOCYTES NFR BLD: 19 %
MCH RBC QN AUTO: 30.2 PG
MCHC RBC AUTO-ENTMCNC: 34.3 G/DL
MCV RBC AUTO: 88 FL
MONOCYTES # BLD AUTO: 0.5 K/UL
MONOCYTES NFR BLD: 6 %
NEUTROPHILS # BLD AUTO: 5.5 K/UL
NEUTROPHILS NFR BLD: 70.5 %
NONHDLC SERPL-MCNC: 159 MG/DL
NRBC BLD-RTO: 0 /100 WBC
PLATELET # BLD AUTO: 279 K/UL
PMV BLD AUTO: 9.5 FL
POTASSIUM SERPL-SCNC: 3.7 MMOL/L
PROT SERPL-MCNC: 7.4 G/DL
RBC # BLD AUTO: 3.58 M/UL
SODIUM SERPL-SCNC: 139 MMOL/L
TRIGL SERPL-MCNC: 135 MG/DL
TSH SERPL DL<=0.005 MIU/L-ACNC: 1.03 UIU/ML
WBC # BLD AUTO: 7.83 K/UL

## 2017-11-06 PROCEDURE — 85025 COMPLETE CBC W/AUTO DIFF WBC: CPT

## 2017-11-06 PROCEDURE — 80061 LIPID PANEL: CPT

## 2017-11-06 PROCEDURE — 80053 COMPREHEN METABOLIC PANEL: CPT

## 2017-11-06 PROCEDURE — 84443 ASSAY THYROID STIM HORMONE: CPT

## 2017-11-06 PROCEDURE — 36415 COLL VENOUS BLD VENIPUNCTURE: CPT | Mod: PO

## 2017-11-06 RX ORDER — IBUPROFEN 800 MG/1
TABLET ORAL
Qty: 90 TABLET | Refills: 0 | Status: SHIPPED | OUTPATIENT
Start: 2017-11-06 | End: 2017-12-05 | Stop reason: SDUPTHER

## 2017-11-06 RX ORDER — BUTALBITAL, ACETAMINOPHEN AND CAFFEINE 50; 325; 40 MG/1; MG/1; MG/1
TABLET ORAL
Qty: 90 TABLET | Refills: 0 | Status: SHIPPED | OUTPATIENT
Start: 2017-11-06 | End: 2017-12-17 | Stop reason: SDUPTHER

## 2017-11-06 RX ORDER — QUETIAPINE FUMARATE 400 MG/1
TABLET, FILM COATED ORAL
Qty: 30 TABLET | Refills: 0 | Status: SHIPPED | OUTPATIENT
Start: 2017-11-06 | End: 2017-12-17 | Stop reason: SDUPTHER

## 2017-11-29 RX ORDER — OXYCODONE AND ACETAMINOPHEN 10; 325 MG/1; MG/1
1 TABLET ORAL EVERY 6 HOURS PRN
Qty: 120 TABLET | Refills: 0 | Status: SHIPPED | OUTPATIENT
Start: 2017-11-29 | End: 2017-12-28 | Stop reason: SDUPTHER

## 2017-11-29 RX ORDER — CLONAZEPAM 1 MG/1
1 TABLET ORAL 3 TIMES DAILY
Qty: 90 TABLET | Refills: 3 | Status: CANCELLED | OUTPATIENT
Start: 2017-11-29

## 2017-11-30 ENCOUNTER — TELEPHONE (OUTPATIENT)
Dept: HEPATOLOGY | Facility: CLINIC | Age: 42
End: 2017-11-30

## 2017-11-30 NOTE — TELEPHONE ENCOUNTER
Attempt made to reach patient.  LVM that appt with LULA Humphreys rescheduled to 1/8/18; appt notice mailed.

## 2017-12-01 ENCOUNTER — PATIENT MESSAGE (OUTPATIENT)
Dept: INTERNAL MEDICINE | Facility: CLINIC | Age: 42
End: 2017-12-01

## 2017-12-05 RX ORDER — TIZANIDINE 4 MG/1
4 TABLET ORAL EVERY 6 HOURS PRN
Qty: 90 TABLET | Refills: 1 | Status: SHIPPED | OUTPATIENT
Start: 2017-12-05 | End: 2018-02-26 | Stop reason: SDUPTHER

## 2017-12-05 RX ORDER — IBUPROFEN 800 MG/1
TABLET ORAL
Qty: 90 TABLET | Refills: 0 | Status: SHIPPED | OUTPATIENT
Start: 2017-12-05 | End: 2018-01-24 | Stop reason: SDUPTHER

## 2017-12-18 RX ORDER — QUETIAPINE FUMARATE 400 MG/1
TABLET, FILM COATED ORAL
Qty: 30 TABLET | Refills: 3 | Status: SHIPPED | OUTPATIENT
Start: 2017-12-18 | End: 2018-04-25 | Stop reason: SDUPTHER

## 2017-12-18 RX ORDER — BUTALBITAL, ACETAMINOPHEN AND CAFFEINE 50; 325; 40 MG/1; MG/1; MG/1
TABLET ORAL
Qty: 90 TABLET | Refills: 3 | Status: SHIPPED | OUTPATIENT
Start: 2017-12-18 | End: 2018-02-26 | Stop reason: SDUPTHER

## 2017-12-29 ENCOUNTER — PATIENT MESSAGE (OUTPATIENT)
Dept: INTERNAL MEDICINE | Facility: CLINIC | Age: 42
End: 2017-12-29

## 2017-12-29 RX ORDER — CLONAZEPAM 1 MG/1
1 TABLET ORAL 3 TIMES DAILY
Qty: 90 TABLET | Refills: 3 | Status: SHIPPED | OUTPATIENT
Start: 2017-12-29 | End: 2018-01-31 | Stop reason: SDUPTHER

## 2017-12-29 RX ORDER — OXYCODONE AND ACETAMINOPHEN 10; 325 MG/1; MG/1
1 TABLET ORAL EVERY 6 HOURS PRN
Qty: 120 TABLET | Refills: 0 | Status: SHIPPED | OUTPATIENT
Start: 2017-12-29 | End: 2018-01-31 | Stop reason: SDUPTHER

## 2018-01-08 ENCOUNTER — TELEPHONE (OUTPATIENT)
Dept: HEPATOLOGY | Facility: CLINIC | Age: 43
End: 2018-01-08

## 2018-01-24 RX ORDER — ESTRADIOL 2 MG/1
TABLET ORAL
Qty: 90 TABLET | Refills: 0 | Status: SHIPPED | OUTPATIENT
Start: 2018-01-24 | End: 2018-03-29 | Stop reason: SDUPTHER

## 2018-01-24 RX ORDER — IBUPROFEN 800 MG/1
TABLET ORAL
Qty: 90 TABLET | Refills: 0 | Status: SHIPPED | OUTPATIENT
Start: 2018-01-24 | End: 2018-02-26 | Stop reason: SDUPTHER

## 2018-01-31 ENCOUNTER — OFFICE VISIT (OUTPATIENT)
Dept: INTERNAL MEDICINE | Facility: CLINIC | Age: 43
End: 2018-01-31
Payer: MEDICAID

## 2018-01-31 VITALS
HEART RATE: 100 BPM | WEIGHT: 167.13 LBS | DIASTOLIC BLOOD PRESSURE: 94 MMHG | RESPIRATION RATE: 14 BRPM | TEMPERATURE: 98 F | BODY MASS INDEX: 30.76 KG/M2 | HEIGHT: 62 IN | SYSTOLIC BLOOD PRESSURE: 130 MMHG

## 2018-01-31 DIAGNOSIS — F31.9 BIPOLAR AFFECTIVE DISORDER, REMISSION STATUS UNSPECIFIED: ICD-10-CM

## 2018-01-31 DIAGNOSIS — R82.90 MALODOROUS URINE: Primary | ICD-10-CM

## 2018-01-31 PROCEDURE — 99214 OFFICE O/P EST MOD 30 MIN: CPT | Mod: PBBFAC,PO | Performed by: INTERNAL MEDICINE

## 2018-01-31 PROCEDURE — 99999 PR PBB SHADOW E&M-EST. PATIENT-LVL IV: CPT | Mod: PBBFAC,,, | Performed by: INTERNAL MEDICINE

## 2018-01-31 PROCEDURE — 99213 OFFICE O/P EST LOW 20 MIN: CPT | Mod: S$PBB,,, | Performed by: INTERNAL MEDICINE

## 2018-01-31 RX ORDER — CLONAZEPAM 1 MG/1
1 TABLET ORAL 3 TIMES DAILY
Qty: 90 TABLET | Refills: 3 | Status: SHIPPED | OUTPATIENT
Start: 2018-01-31 | End: 2018-03-27 | Stop reason: SDUPTHER

## 2018-01-31 RX ORDER — OMEPRAZOLE 40 MG/1
40 CAPSULE, DELAYED RELEASE ORAL
Qty: 180 CAPSULE | Refills: 3 | Status: SHIPPED | OUTPATIENT
Start: 2018-01-31 | End: 2018-01-31 | Stop reason: SDUPTHER

## 2018-01-31 RX ORDER — OMEPRAZOLE 40 MG/1
40 CAPSULE, DELAYED RELEASE ORAL
Qty: 180 CAPSULE | Refills: 3 | Status: SHIPPED | OUTPATIENT
Start: 2018-01-31 | End: 2019-01-10 | Stop reason: SDUPTHER

## 2018-01-31 RX ORDER — CLONAZEPAM 1 MG/1
1 TABLET ORAL 3 TIMES DAILY
Qty: 90 TABLET | Refills: 3 | Status: SHIPPED | OUTPATIENT
Start: 2018-01-31 | End: 2018-01-31 | Stop reason: SDUPTHER

## 2018-01-31 RX ORDER — PROMETHAZINE HYDROCHLORIDE 25 MG/1
25 SUPPOSITORY RECTAL EVERY 6 HOURS PRN
Qty: 30 SUPPOSITORY | Refills: 2 | Status: SHIPPED | OUTPATIENT
Start: 2018-01-31 | End: 2018-08-16

## 2018-01-31 RX ORDER — SUMATRIPTAN SUCCINATE 100 MG/1
100 TABLET ORAL
Qty: 20 TABLET | Refills: 3 | Status: SHIPPED | OUTPATIENT
Start: 2018-01-31 | End: 2019-10-22 | Stop reason: SDUPTHER

## 2018-01-31 RX ORDER — PROPRANOLOL HYDROCHLORIDE 80 MG/1
120 TABLET ORAL DAILY
Qty: 135 TABLET | Refills: 4 | Status: SHIPPED | OUTPATIENT
Start: 2018-01-31 | End: 2018-08-16

## 2018-01-31 RX ORDER — SUMATRIPTAN SUCCINATE 100 MG/1
100 TABLET ORAL
Qty: 20 TABLET | Refills: 3 | Status: SHIPPED | OUTPATIENT
Start: 2018-01-31 | End: 2018-01-31 | Stop reason: SDUPTHER

## 2018-01-31 RX ORDER — OXYCODONE AND ACETAMINOPHEN 10; 325 MG/1; MG/1
1 TABLET ORAL EVERY 6 HOURS PRN
Qty: 120 TABLET | Refills: 0 | Status: SHIPPED | OUTPATIENT
Start: 2018-01-31 | End: 2018-01-31 | Stop reason: SDUPTHER

## 2018-01-31 RX ORDER — OXYCODONE AND ACETAMINOPHEN 10; 325 MG/1; MG/1
1 TABLET ORAL EVERY 6 HOURS PRN
Qty: 120 TABLET | Refills: 0 | Status: SHIPPED | OUTPATIENT
Start: 2018-01-31 | End: 2018-02-23 | Stop reason: SDUPTHER

## 2018-01-31 RX ORDER — PROPRANOLOL HYDROCHLORIDE 80 MG/1
120 TABLET ORAL DAILY
Qty: 135 TABLET | Refills: 4 | Status: SHIPPED | OUTPATIENT
Start: 2018-01-31 | End: 2018-01-31 | Stop reason: SDUPTHER

## 2018-01-31 RX ORDER — PROMETHAZINE HYDROCHLORIDE 25 MG/1
25 SUPPOSITORY RECTAL EVERY 6 HOURS PRN
Qty: 30 SUPPOSITORY | Refills: 2 | Status: SHIPPED | OUTPATIENT
Start: 2018-01-31 | End: 2018-01-31 | Stop reason: SDUPTHER

## 2018-01-31 NOTE — PROGRESS NOTES
CC: Malodorous urine  HPI:  The patient is a 42 y.o. year old female who presents to the office for malodorous urine.  Her symptoms started about 1 week ago.  She also complains of intermittent emesis and decreased appetite. She reports depression symptoms.  She has difficulty tolerating oral intake.  She met with a  yesterday and requested grief counseling.  The patient also has an appointment with psychiatry.    PAST MEDICAL HISTORY:  Past Medical History:   Diagnosis Date    Allergy     Asthma     childhood    Bipolar disorder     Gestational diabetes mellitus 2001    resolved    Hyperlipidemia     Interstitial cystitis     Migraine headache        SURGICAL HISTORY:  Past Surgical History:   Procedure Laterality Date    ADENOIDECTOMY      bladder lift      CHOLECYSTECTOMY      HYSTERECTOMY      unilateral oophrectomy    OOPHORECTOMY      1 only    OVARIAN CYST REMOVAL      SINUS SURGERY      TONSILLECTOMY         MEDS:  Medcard reviewed and updated    ALLERGIES: Allergy Card reviewed and updated    SOCIAL HISTORY:   The patient is a nonsmoker.    PE:   APPEARANCE: Well nourished, well developed, in no acute distress.    CHEST: Lungs clear to auscultation with unlabored respirations.  CARDIOVASCULAR: Normal S1, S2. No murmurs. No carotid bruits. No pedal edema.  ABDOMEN: Bowel sounds normal. Not distended. Soft. No tenderness or masses.   PSYCHIATRIC: The patient is oriented to person, place, and time and has a sad affect.        ASSESSMENT/PLAN:  Esther was seen today for 6 month f/u, urine odor, emesis and no appetite.    Diagnoses and all orders for this visit:    Malodorous urine  -     Urinalysis; Future  -     Urine culture; Future    Bipolar affective disorder, remission status unspecified  -     Followup with psychiatry    Answers for HPI/ROS submitted by the patient on 1/30/2018   activity change: No  unexpected weight change: Yes  neck pain: Yes  hearing loss: No  rhinorrhea:  Yes  trouble swallowing: Yes  eye discharge: Yes  visual disturbance: Yes  chest tightness: No  wheezing: No  chest pain: No  palpitations: Yes  blood in stool: No  constipation: No  vomiting: Yes  diarrhea: No  polydipsia: No  polyuria: No  difficulty urinating: Yes  hematuria: No  menstrual problem: No  dysuria: No  joint swelling: Yes  arthralgias: Yes  headaches: Yes  weakness: Yes  confusion: No  dysphoric mood: Yes

## 2018-02-14 RX ORDER — SULFAMETHOXAZOLE AND TRIMETHOPRIM 800; 160 MG/1; MG/1
TABLET ORAL
Qty: 10 TABLET | Refills: 0 | Status: SHIPPED | OUTPATIENT
Start: 2018-02-14 | End: 2018-04-17

## 2018-02-23 ENCOUNTER — PATIENT MESSAGE (OUTPATIENT)
Dept: INTERNAL MEDICINE | Facility: CLINIC | Age: 43
End: 2018-02-23

## 2018-02-23 RX ORDER — OXYCODONE AND ACETAMINOPHEN 10; 325 MG/1; MG/1
1 TABLET ORAL EVERY 6 HOURS PRN
Qty: 120 TABLET | Refills: 0 | Status: SHIPPED | OUTPATIENT
Start: 2018-02-23 | End: 2018-03-27 | Stop reason: SDUPTHER

## 2018-02-26 ENCOUNTER — OFFICE VISIT (OUTPATIENT)
Dept: HEPATOLOGY | Facility: CLINIC | Age: 43
End: 2018-02-26
Payer: MEDICAID

## 2018-02-26 ENCOUNTER — LAB VISIT (OUTPATIENT)
Dept: LAB | Facility: HOSPITAL | Age: 43
End: 2018-02-26
Payer: MEDICAID

## 2018-02-26 VITALS
OXYGEN SATURATION: 98 % | HEART RATE: 75 BPM | HEIGHT: 60 IN | RESPIRATION RATE: 18 BRPM | DIASTOLIC BLOOD PRESSURE: 57 MMHG | BODY MASS INDEX: 34.89 KG/M2 | SYSTOLIC BLOOD PRESSURE: 116 MMHG | TEMPERATURE: 98 F | WEIGHT: 177.69 LBS

## 2018-02-26 DIAGNOSIS — B18.2 CHRONIC HEPATITIS C WITHOUT HEPATIC COMA: ICD-10-CM

## 2018-02-26 DIAGNOSIS — B18.2 CHRONIC HEPATITIS C WITHOUT HEPATIC COMA: Primary | ICD-10-CM

## 2018-02-26 DIAGNOSIS — F19.90 IVDU (INTRAVENOUS DRUG USER): ICD-10-CM

## 2018-02-26 DIAGNOSIS — R60.0 HAND EDEMA: ICD-10-CM

## 2018-02-26 PROBLEM — G43.909 MIGRAINE HEADACHE: Status: ACTIVE | Noted: 2018-02-26

## 2018-02-26 PROBLEM — I10 HTN (HYPERTENSION): Status: ACTIVE | Noted: 2018-02-26

## 2018-02-26 PROBLEM — F42.9 OCD (OBSESSIVE COMPULSIVE DISORDER): Status: ACTIVE | Noted: 2018-02-26

## 2018-02-26 LAB
ALBUMIN SERPL BCP-MCNC: 3.8 G/DL
ALP SERPL-CCNC: 117 U/L
ALT SERPL W/O P-5'-P-CCNC: 19 U/L
ANION GAP SERPL CALC-SCNC: 11 MMOL/L
AST SERPL-CCNC: 27 U/L
BASOPHILS # BLD AUTO: 0.1 K/UL
BASOPHILS NFR BLD: 1.4 %
BILIRUB SERPL-MCNC: 0.6 MG/DL
BUN SERPL-MCNC: 21 MG/DL
CALCIUM SERPL-MCNC: 9.4 MG/DL
CHLORIDE SERPL-SCNC: 102 MMOL/L
CO2 SERPL-SCNC: 26 MMOL/L
CREAT SERPL-MCNC: 0.9 MG/DL
DIFFERENTIAL METHOD: ABNORMAL
EOSINOPHIL # BLD AUTO: 0.5 K/UL
EOSINOPHIL NFR BLD: 6.5 %
ERYTHROCYTE [DISTWIDTH] IN BLOOD BY AUTOMATED COUNT: 13.4 %
EST. GFR  (AFRICAN AMERICAN): >60 ML/MIN/1.73 M^2
EST. GFR  (NON AFRICAN AMERICAN): >60 ML/MIN/1.73 M^2
GLUCOSE SERPL-MCNC: 122 MG/DL
HCT VFR BLD AUTO: 34.4 %
HGB BLD-MCNC: 11.3 G/DL
IMM GRANULOCYTES # BLD AUTO: 0.04 K/UL
IMM GRANULOCYTES NFR BLD AUTO: 0.6 %
INR PPP: 0.9
LYMPHOCYTES # BLD AUTO: 2.4 K/UL
LYMPHOCYTES NFR BLD: 33.7 %
MCH RBC QN AUTO: 28 PG
MCHC RBC AUTO-ENTMCNC: 32.8 G/DL
MCV RBC AUTO: 85 FL
MONOCYTES # BLD AUTO: 0.6 K/UL
MONOCYTES NFR BLD: 7.6 %
NEUTROPHILS # BLD AUTO: 3.6 K/UL
NEUTROPHILS NFR BLD: 50.2 %
NRBC BLD-RTO: 0 /100 WBC
PLATELET # BLD AUTO: 307 K/UL
PMV BLD AUTO: 9.4 FL
POTASSIUM SERPL-SCNC: 4.1 MMOL/L
PROT SERPL-MCNC: 7.7 G/DL
PROTHROMBIN TIME: 9.8 SEC
RBC # BLD AUTO: 4.04 M/UL
SODIUM SERPL-SCNC: 139 MMOL/L
WBC # BLD AUTO: 7.24 K/UL

## 2018-02-26 PROCEDURE — 87522 HEPATITIS C REVRS TRNSCRPJ: CPT

## 2018-02-26 PROCEDURE — 86790 VIRUS ANTIBODY NOS: CPT

## 2018-02-26 PROCEDURE — 99999 PR PBB SHADOW E&M-EST. PATIENT-LVL V: CPT | Mod: PBBFAC,,, | Performed by: PHYSICIAN ASSISTANT

## 2018-02-26 PROCEDURE — 86706 HEP B SURFACE ANTIBODY: CPT

## 2018-02-26 PROCEDURE — 86704 HEP B CORE ANTIBODY TOTAL: CPT

## 2018-02-26 PROCEDURE — 85025 COMPLETE CBC W/AUTO DIFF WBC: CPT

## 2018-02-26 PROCEDURE — 86703 HIV-1/HIV-2 1 RESULT ANTBDY: CPT

## 2018-02-26 PROCEDURE — 80053 COMPREHEN METABOLIC PANEL: CPT

## 2018-02-26 PROCEDURE — 99214 OFFICE O/P EST MOD 30 MIN: CPT | Mod: S$PBB,,, | Performed by: PHYSICIAN ASSISTANT

## 2018-02-26 PROCEDURE — 36415 COLL VENOUS BLD VENIPUNCTURE: CPT

## 2018-02-26 PROCEDURE — 87340 HEPATITIS B SURFACE AG IA: CPT

## 2018-02-26 PROCEDURE — 87902 NFCT AGT GNTYP ALYS HEP C: CPT

## 2018-02-26 PROCEDURE — 85610 PROTHROMBIN TIME: CPT

## 2018-02-26 PROCEDURE — 3008F BODY MASS INDEX DOCD: CPT | Mod: ,,, | Performed by: PHYSICIAN ASSISTANT

## 2018-02-26 PROCEDURE — 99215 OFFICE O/P EST HI 40 MIN: CPT | Mod: PBBFAC | Performed by: PHYSICIAN ASSISTANT

## 2018-02-26 RX ORDER — FLUOXETINE HYDROCHLORIDE 40 MG/1
40 CAPSULE ORAL DAILY
COMMUNITY
End: 2018-04-25 | Stop reason: SDUPTHER

## 2018-02-26 NOTE — LETTER
February 26, 2018      Rama Lee MD  [address]  [place] [address]           [facility]  [address]  [place] [address]  Phone: [phone]  Fax: [phone]          Patient: Esther Gage   MR Number: [id number]   YOB: 1975   Date of Visit: 2/26/2018       Dear Dr. Rama Lee:    Thank you for referring Esther Gage to me for evaluation. Attached you will find relevant portions of my assessment and plan of care.    If you have questions, please do not hesitate to call me. I look forward to following Esther Gage along with you.    Sincerely,    Redd Humphreys PA-C    Enclosure  CC:  No Recipients    If you would like to receive this communication electronically, please contact [email or web address] or [phone] to request more information on [employer] Link access.    For providers and/or their staff who would like to refer a patient to [facility], please contact us through our one-stop-shop provider referral line, [facility], at [phone].    If you feel you have received this communication in error or would no longer like to receive these types of communications, please e-mail [email or web address]
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
## 2018-02-27 LAB
HBV CORE AB SERPL QL IA: NEGATIVE
HBV SURFACE AB SER-ACNC: NEGATIVE M[IU]/ML
HBV SURFACE AG SERPL QL IA: NEGATIVE
HEPATITIS A ANTIBODY, IGG: NEGATIVE
HIV 1+2 AB+HIV1 P24 AG SERPL QL IA: NEGATIVE

## 2018-02-27 RX ORDER — IBUPROFEN 800 MG/1
TABLET ORAL
Qty: 90 TABLET | Refills: 0 | Status: SHIPPED | OUTPATIENT
Start: 2018-02-27 | End: 2018-08-16

## 2018-02-27 RX ORDER — TIZANIDINE 4 MG/1
TABLET ORAL
Qty: 90 TABLET | Refills: 0 | Status: SHIPPED | OUTPATIENT
Start: 2018-02-27 | End: 2018-08-16

## 2018-02-27 RX ORDER — ZOLPIDEM TARTRATE 10 MG/1
TABLET ORAL
Qty: 30 TABLET | Refills: 0 | Status: SHIPPED | OUTPATIENT
Start: 2018-02-27 | End: 2018-04-24 | Stop reason: SDUPTHER

## 2018-02-28 ENCOUNTER — PATIENT MESSAGE (OUTPATIENT)
Dept: INTERNAL MEDICINE | Facility: CLINIC | Age: 43
End: 2018-02-28

## 2018-02-28 ENCOUNTER — TELEPHONE (OUTPATIENT)
Dept: INTERNAL MEDICINE | Facility: CLINIC | Age: 43
End: 2018-02-28

## 2018-02-28 RX ORDER — IBUPROFEN 800 MG/1
800 TABLET ORAL 3 TIMES DAILY PRN
Qty: 90 TABLET | Refills: 1 | Status: SHIPPED | OUTPATIENT
Start: 2018-02-28 | End: 2018-04-17 | Stop reason: SDUPTHER

## 2018-02-28 RX ORDER — ZOLPIDEM TARTRATE 10 MG/1
10 TABLET ORAL NIGHTLY PRN
Qty: 30 TABLET | Refills: 3 | Status: SHIPPED | OUTPATIENT
Start: 2018-02-28 | End: 2018-04-17 | Stop reason: SDUPTHER

## 2018-02-28 RX ORDER — TIZANIDINE 4 MG/1
4 TABLET ORAL EVERY 6 HOURS PRN
Qty: 90 TABLET | Refills: 1 | Status: SHIPPED | OUTPATIENT
Start: 2018-02-28 | End: 2018-04-17 | Stop reason: SDUPTHER

## 2018-03-02 ENCOUNTER — TELEPHONE (OUTPATIENT)
Dept: HEPATOLOGY | Facility: CLINIC | Age: 43
End: 2018-03-02

## 2018-03-02 DIAGNOSIS — B18.2 CHRONIC HEPATITIS C WITHOUT HEPATIC COMA: Primary | ICD-10-CM

## 2018-03-02 LAB
HCV GENTYP SERPL NAA+PROBE: ABNORMAL
HCV QUALITATIVE RESULT: DETECTED
HCV QUANTITATIVE LOG: 4.01 LOG (10) IU/ML
HCV RNA SPEC NAA+PROBE-ACNC: ABNORMAL IU/ML

## 2018-03-02 NOTE — TELEPHONE ENCOUNTER
----- Message from Kristan Zaragoza, [facility] sent at 3/2/2018  1:50 PM CST -----  Regarding: twinrix  Good afternoon! Mr. Gage's immunization is not covered in the pharmacy if your office would like to contact the patient. Thank you!

## 2018-03-02 NOTE — TELEPHONE ENCOUNTER
----- Message from Redd Humphreys PA-C sent at 3/2/2018  1:43 PM CST -----  Please let her know that these labs are stable. Labs indicate no immunity to hepatitis A or B. I will send RX to pharmacy and they will reach out to patient.   Thanks

## 2018-03-23 ENCOUNTER — TELEPHONE (OUTPATIENT)
Dept: INTERNAL MEDICINE | Facility: CLINIC | Age: 43
End: 2018-03-23
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
## 2018-03-26 RX ORDER — ZOLPIDEM TARTRATE 10 MG/1
TABLET ORAL
Qty: 30 TABLET | Refills: 3 | Status: SHIPPED | OUTPATIENT
Start: 2018-03-26 | End: 2018-04-17 | Stop reason: SDUPTHER

## 2018-03-27 ENCOUNTER — PATIENT MESSAGE (OUTPATIENT)
Dept: INTERNAL MEDICINE | Facility: CLINIC | Age: 43
End: 2018-03-27

## 2018-03-27 RX ORDER — CLONAZEPAM 1 MG/1
1 TABLET ORAL 3 TIMES DAILY
Qty: 90 TABLET | Refills: 3 | Status: SHIPPED | OUTPATIENT
Start: 2018-03-27 | End: 2018-04-24 | Stop reason: SDUPTHER

## 2018-03-27 RX ORDER — OXYCODONE AND ACETAMINOPHEN 10; 325 MG/1; MG/1
1 TABLET ORAL EVERY 6 HOURS PRN
Qty: 120 TABLET | Refills: 0 | Status: SHIPPED | OUTPATIENT
Start: 2018-03-27 | End: 2018-04-24 | Stop reason: SDUPTHER

## 2018-03-28 ENCOUNTER — PATIENT MESSAGE (OUTPATIENT)
Dept: INTERNAL MEDICINE | Facility: CLINIC | Age: 43
End: 2018-03-28

## 2018-03-29 ENCOUNTER — PATIENT MESSAGE (OUTPATIENT)
Dept: INTERNAL MEDICINE | Facility: CLINIC | Age: 43
End: 2018-03-29

## 2018-03-29 RX ORDER — ESTRADIOL 2 MG/1
TABLET ORAL
Qty: 90 TABLET | Refills: 0 | Status: SHIPPED | OUTPATIENT
Start: 2018-03-29 | End: 2018-04-24 | Stop reason: SDUPTHER

## 2018-04-03 ENCOUNTER — TELEPHONE (OUTPATIENT)
Dept: INTERNAL MEDICINE | Facility: CLINIC | Age: 43
End: 2018-04-03

## 2018-04-03 NOTE — TELEPHONE ENCOUNTER
----- Message from Arlette Iniguez sent at 4/3/2018  7:29 AM CDT -----  Contact: Nicollette  on behalf of [employer]  [phone] option #2  Please call her to scheduled to [place] on this patient. Case it due April 13 th and they will need to know ASAP the date and time you'd like.

## 2018-04-05 ENCOUNTER — PATIENT MESSAGE (OUTPATIENT)
Dept: INTERNAL MEDICINE | Facility: CLINIC | Age: 43
End: 2018-04-05

## 2018-04-06 ENCOUNTER — TELEPHONE (OUTPATIENT)
Dept: INTERNAL MEDICINE | Facility: CLINIC | Age: 43
End: 2018-04-06

## 2018-04-06 NOTE — TELEPHONE ENCOUNTER
----- Message from Irina Harvey sent at 4/6/2018 11:04 AM CDT -----  Contact: Dr. Angel Groves/[phone]  Dr Groves would like to speak with the doctor for a Peer to Peer review on the pt.

## 2018-04-10 ENCOUNTER — TELEPHONE (OUTPATIENT)
Dept: INTERNAL MEDICINE | Facility: CLINIC | Age: 43
End: 2018-04-10

## 2018-04-17 ENCOUNTER — CLINICAL SUPPORT (OUTPATIENT)
Dept: INFECTIOUS DISEASES | Facility: CLINIC | Age: 43
End: 2018-04-17
Payer: MEDICAID

## 2018-04-17 ENCOUNTER — PROCEDURE VISIT (OUTPATIENT)
Dept: HEPATOLOGY | Facility: CLINIC | Age: 43
End: 2018-04-17
Attending: PHYSICIAN ASSISTANT
Payer: MEDICAID

## 2018-04-17 ENCOUNTER — OFFICE VISIT (OUTPATIENT)
Dept: HEPATOLOGY | Facility: CLINIC | Age: 43
End: 2018-04-17
Payer: MEDICAID

## 2018-04-17 ENCOUNTER — HOSPITAL ENCOUNTER (OUTPATIENT)
Dept: RADIOLOGY | Facility: HOSPITAL | Age: 43
Discharge: HOME | End: 2018-04-17
Attending: PHYSICIAN ASSISTANT
Payer: MEDICAID

## 2018-04-17 ENCOUNTER — TELEPHONE (OUTPATIENT)
Dept: HEPATOLOGY | Facility: CLINIC | Age: 43
End: 2018-04-17

## 2018-04-17 VITALS
HEIGHT: 61 IN | TEMPERATURE: 97 F | BODY MASS INDEX: 35.18 KG/M2 | RESPIRATION RATE: 16 BRPM | SYSTOLIC BLOOD PRESSURE: 149 MMHG | OXYGEN SATURATION: 98 % | HEART RATE: 72 BPM | WEIGHT: 186.31 LBS | DIASTOLIC BLOOD PRESSURE: 90 MMHG

## 2018-04-17 DIAGNOSIS — K74.00 LIVER FIBROSIS: ICD-10-CM

## 2018-04-17 DIAGNOSIS — B18.2 CHRONIC HEPATITIS C WITHOUT HEPATIC COMA: Primary | ICD-10-CM

## 2018-04-17 DIAGNOSIS — B18.2 CHRONIC HEPATITIS C WITHOUT HEPATIC COMA: ICD-10-CM

## 2018-04-17 DIAGNOSIS — F19.91 HISTORY OF DRUG USE: ICD-10-CM

## 2018-04-17 PROCEDURE — 99214 OFFICE O/P EST MOD 30 MIN: CPT | Mod: S$PBB,,, | Performed by: PHYSICIAN ASSISTANT

## 2018-04-17 PROCEDURE — 90471 IMMUNIZATION ADMIN: CPT | Mod: PBBFAC

## 2018-04-17 PROCEDURE — 99215 OFFICE O/P EST HI 40 MIN: CPT | Mod: PBBFAC,25 | Performed by: PHYSICIAN ASSISTANT

## 2018-04-17 PROCEDURE — 99999 PR PBB SHADOW E&M-EST. PATIENT-LVL V: CPT | Mod: PBBFAC,,, | Performed by: PHYSICIAN ASSISTANT

## 2018-04-17 PROCEDURE — 76700 US EXAM ABDOM COMPLETE: CPT | Mod: TC,59

## 2018-04-17 PROCEDURE — 91200 LIVER ELASTOGRAPHY: CPT | Mod: PBBFAC | Performed by: PHYSICIAN ASSISTANT

## 2018-04-17 PROCEDURE — 76700 US EXAM ABDOM COMPLETE: CPT | Mod: 26,,, | Performed by: RADIOLOGY

## 2018-04-17 PROCEDURE — 91200 LIVER ELASTOGRAPHY: CPT | Mod: 26,S$PBB,, | Performed by: PHYSICIAN ASSISTANT

## 2018-04-17 RX ORDER — QUETIAPINE FUMARATE 200 MG/1
200 TABLET, FILM COATED ORAL
COMMUNITY
End: 2018-04-25 | Stop reason: SDUPTHER

## 2018-04-17 NOTE — PROCEDURES
Procedures   Fibroscan Procedure     Name: Esther Gage  Date of Procedure : 2018   :: Redd Humphreys PA-C  Diagnosis: HCV    Probe: XL    Fibroscan readin.1 KPa    Fibrosis:F1-F2     CAP readin dB/m    Steatosis: :S2

## 2018-04-17 NOTE — PROGRESS NOTES
HEPATOLOGY CLINIC VISIT NOTE - HCV clinic    REFERRING PROVIDER: Dr. Rama Lee    CHIEF COMPLAINT: Hepatitis C - discuss treatment    HISTORY: This is a 42 y.o. White female with chronic hepatitis C here for f/u. Labs reveal that she is genotype 1a with a HCV RNA of 10,069 IU/mL. She has normal transaminases and normal synthetic liver function. Her PLTs are preserved.     She reports HCV is still a new diagnosis, she is very nervous about this. She has numerous risk factors for HCV. She has h/o unregulated tattoos and numerous tattoos, she had an unregulated tattoo placed 6 months ago, h/o smoking crack cocaine and IVDU. 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    She is here today and again admits recent relapse with IVDU. She reports she has been struggling signifcantly with her bipolar disorder. She is establishing with a new psychiatrist for optimization of medication and CBD. She reports she feels anhedonic, although recently saw her son and feels her outlook has improved since then. She reports feelings that life may not be worth living, but denies SI or HI. She states she just feels that her health and mental status are difficult to control. She has a h/o OCD as well. She reports that she is upset by her recent drug use. She reports long periods of sobriety in past. She is aware of need to abstain for 4-6 months prior to HCV treatment.     Pt underwent ultrasound and fibroscan prior to visit. Ultrasound noted heterogenous texture of liver and splenomegaly concerning for cirrhosis, although fibroscan was F1-F2. There are no laboratory findings concerning for cirrhosis. APRI and FIB 4 are not suggestive of advanced fibrosis.     HCV history:  genotype 1a  HCV RNA of 10,069 IU/mL  Treatment naive    Risk Factors:  Tattoos- (+) numerous; unregulated 6-7 months ago  IV/ALEISHA- Smoked crack cocaine, IVDU in 2012; relapsed last August- reports clean since then with brief relapse a few weeks ago;  IVDU a few weeks ago.   (+) works as MA, reports 3 needle sticks over course since working at age 20    Liver staging:  Fibroscan F1-F2  U/S ?cirrhosis  AST 20, ALT 10  Tbili WNL  Albumin WNL  PLTs >150    Past Medical History:   Diagnosis Date    Allergy     Asthma     childhood    Bipolar disorder     Gestational diabetes mellitus 2001    resolved    HTN (hypertension)     Hyperlipidemia     Interstitial cystitis     Migraine headache     OCD (obsessive compulsive disorder)      Past Surgical History:   Procedure Laterality Date    ADENOIDECTOMY      bladder lift      CHOLECYSTECTOMY      HYSTERECTOMY      unilateral oophrectomy    OOPHORECTOMY      1 only    OVARIAN CYST REMOVAL      SINUS SURGERY      TONSILLECTOMY       FAMILY HISTORY: Negative for liver disease    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   NECK: Supple  LUNGS: Normal respiratory effort.  ABDOMEN: Flat, soft, nontender.  SKIN: Warm and dry. No jaundice, No obvious rashes.   EXTREMITIES: No lower extremity edema, scaring to AC fossa   NEURO/PSYCH: Normal gate. Memory intact. Thought and speech pattern appropriate. Behavior normal. No depression or anxiety noted.    RECENT LABS:  Lab Results   Component Value Date    WBC 7.24 02/26/2018    HGB 11.3 (L) 02/26/2018     02/26/2018     Lab Results   Component Value Date    INR 0.9 02/26/2018     Lab Results   Component Value Date    AST 27 02/26/2018    ALT 19  02/26/2018    BILITOT 0.6 02/26/2018    ALBUMIN 3.8 02/26/2018    ALKPHOS 117 02/26/2018    CREATININE 0.9 02/26/2018    BUN 21 (H) 02/26/2018     02/26/2018    K 4.1 02/26/2018   RECENT IMAGING:  Ultrasound abdomen 04/2018  Narrative     EXAMINATION:  US ABDOMEN COMPLETE    CLINICAL HISTORY:  Chronic viral hepatitis C    TECHNIQUE:  Complete abdominal ultrasound (including pancreas, liver, gallbladder, common bile duct, spleen, aorta, IVC, and kidneys) was performed.    COMPARISON:  CT renal stone 01/15/2014 and ultrasound abdomen 05/28/2013    FINDINGS:  Liver: Enlarged measuring 19.5 cm. Hepatic parenchyma is mildly heterogeneous, finding that can be seen in cirrhosis.  No focal hepatic lesions.    Gallbladder: Has been removed.    Biliary system: The common duct is prominent in size, measuring 6 mm.  No intrahepatic ductal dilatation.    Spleen: Mildly enlarged with homogeneous echotexture, measuring 12 x 5.6 cm.    Pancreas: The visualized portions of pancreas appear normal.    Right kidney: Normal in size with no hydronephrosis, measuring 10.6 cm.    Left kidney:  Normal in size with no hydronephrosis, measuring 11.0 cm.    Aorta: No aneurysm.    Inferior vena cava: Normal in appearance.    Miscellaneous: No ascites.   Impression       Mildly heterogeneous liver parenchyma, consistent with cirrhosis.    Cholecystectomy.    Mild splenomegaly.     ASSESSMENT  42 y.o. White female with:  1. CHRONIC HEPATITIS C, GENOTYPE 1a - treatment naive  -- Prior HCV treatment -  -- Elevated transaminases  -- negative immunity to HAV and HBV, vaccinated for HBV in past. Given high risk behavior, will repeat HBV vaccinations     2. ?LIVER FIBROSIS  -- fibroscan F1-F2  -- U/S noting heterogenous liver, hepatomegaly and splenomegaly  -- discuss further staging with liver biopsy, patient amendable to biopsy, will get CBC, CMP, PT/INR and schedule biopsy.     3. H/O IVDU  -- reports period of sobriety x years, recent 1 day  relapse  -- attends NA daily   -- informed of need for cessation x 6 months prior to starting treatment    EDUCATION:  The natural history of Hepatitis C, including potential progression to cirrhosis was reviewed. Complications of cirrhosis, including hepatic decompensation, liver cancer, liver failure, need for liver transplant, and death were reviewed.     We discussed the increased progression of liver disease secondary to alcohol use; patient was advised to avoid alcohol completely.     Transmission of Hepatitis C was reviewed, including possible sexual transmission. Sexual contacts should be screened.     Risk of vertical transmission of Hepatitis C from mother to baby was reviewed. Children should be screened.     Patient should avoid sharing personal products such as razors, toothbrushes, etc.     We reviewed that vaccination against Hepatitis A and Hepatitis B is recommended if patient does not already have immunity.    Recommend avoiding raw seafood.    Limit acetaminophen to 2000mg daily.    PLAN:  1. Labs today  2. Schedule liver biopsy  3. F/u visit in 4 months to discuss HCV treatment    Redd Humphreys PA-C

## 2018-04-17 NOTE — PROGRESS NOTES
Pt received her first dose of Hepatitis A/B vaccination. Pt tolerated the injection well. Return appt provided. Pt left the unit in NAD.

## 2018-04-17 NOTE — TELEPHONE ENCOUNTER
----- Message from Redd Humphreys PA-C sent at 4/17/2018 12:43 PM CDT -----  Please recall for f/u in 4 months

## 2018-04-18 ENCOUNTER — PATIENT MESSAGE (OUTPATIENT)
Dept: INTERNAL MEDICINE | Facility: CLINIC | Age: 43
End: 2018-04-18

## 2018-04-19 RX ORDER — GABAPENTIN 300 MG/1
300 CAPSULE ORAL 3 TIMES DAILY
Qty: 90 CAPSULE | Refills: 11 | Status: SHIPPED | OUTPATIENT
Start: 2018-04-19 | End: 2018-08-16

## 2018-04-20 ENCOUNTER — PATIENT MESSAGE (OUTPATIENT)
Dept: HEPATOLOGY | Facility: CLINIC | Age: 43
End: 2018-04-20

## 2018-04-20 ENCOUNTER — TELEPHONE (OUTPATIENT)
Dept: HEPATOLOGY | Facility: CLINIC | Age: 43
End: 2018-04-20

## 2018-04-20 DIAGNOSIS — B18.2 CHRONIC HEPATITIS C WITHOUT HEPATIC COMA: Primary | ICD-10-CM

## 2018-04-20 NOTE — TELEPHONE ENCOUNTER
Patient contacted and scheduled to have US Guided Liver Biopsy on 5-2-18 at 7:00 am as ordered by LULA Humphreys.  We reviewed adult instructions for out-patient diagnostic procedures.  Instructions mailed to patient along with a copy of her med list.  Patient on Motrin.  Per Aria in radiology patient ordered to stop Motrin 5 days before biopsy.  Patient told that she should stop Motrin as ordered on 4-26-18. Biopsy order and GI Case Request entered.

## 2018-04-20 NOTE — TELEPHONE ENCOUNTER
----- Message from Lorrie Campo MA sent at 4/20/2018  8:56 AM CDT -----      ----- Message -----  From: Corona Sidhu MA  Sent: 4/17/2018   1:37 PM  To: [facility] Hepat Clinical Staff        ----- Message -----  From: Redd Humphreys PA-C  Sent: 4/17/2018  12:44 PM  To: [facility] Hepat Non-Clinical Staff    Please schedule liver biopsy; U/S UTD, CBC and PT INR done today

## 2018-04-20 NOTE — TELEPHONE ENCOUNTER
I spoke with patient.  Biopsy preferences discussed.  Procedure request form faxed to Ultrasound Dept. F/u visit scheduled with LULA Humphreys on 8/14/18.

## 2018-04-23 ENCOUNTER — PATIENT MESSAGE (OUTPATIENT)
Dept: INTERNAL MEDICINE | Facility: CLINIC | Age: 43
End: 2018-04-23

## 2018-04-24 ENCOUNTER — PATIENT MESSAGE (OUTPATIENT)
Dept: INTERNAL MEDICINE | Facility: CLINIC | Age: 43
End: 2018-04-24

## 2018-04-24 RX ORDER — ZOLPIDEM TARTRATE 10 MG/1
10 TABLET ORAL NIGHTLY
Qty: 30 TABLET | Refills: 0 | Status: SHIPPED | OUTPATIENT
Start: 2018-04-24 | End: 2018-08-16

## 2018-04-24 RX ORDER — ESTRADIOL 2 MG/1
2 TABLET ORAL DAILY
Qty: 90 TABLET | Refills: 1 | Status: SHIPPED | OUTPATIENT
Start: 2018-04-24 | End: 2018-08-16

## 2018-04-24 RX ORDER — CLONAZEPAM 1 MG/1
1 TABLET ORAL 3 TIMES DAILY
Qty: 90 TABLET | Refills: 3 | Status: SHIPPED | OUTPATIENT
Start: 2018-04-24 | End: 2018-12-28 | Stop reason: SDUPTHER

## 2018-04-24 RX ORDER — OXYCODONE AND ACETAMINOPHEN 10; 325 MG/1; MG/1
1 TABLET ORAL EVERY 6 HOURS PRN
Qty: 120 TABLET | Refills: 0 | Status: SHIPPED | OUTPATIENT
Start: 2018-04-24 | End: 2018-05-23 | Stop reason: SDUPTHER

## 2018-04-24 RX ORDER — FUROSEMIDE 20 MG/1
20 TABLET ORAL DAILY PRN
Qty: 30 TABLET | Refills: 3 | Status: SHIPPED | OUTPATIENT
Start: 2018-04-24 | End: 2019-01-10 | Stop reason: SDUPTHER

## 2018-04-25 RX ORDER — QUETIAPINE FUMARATE 200 MG/1
200 TABLET, FILM COATED ORAL DAILY
Qty: 30 TABLET | Refills: 0 | Status: SHIPPED | OUTPATIENT
Start: 2018-04-25 | End: 2018-06-29 | Stop reason: SDUPTHER

## 2018-04-25 RX ORDER — PROMETHAZINE HYDROCHLORIDE 25 MG/1
SUPPOSITORY RECTAL
Qty: 30 SUPPOSITORY | Refills: 0 | Status: SHIPPED | OUTPATIENT
Start: 2018-04-25 | End: 2023-03-02

## 2018-04-25 RX ORDER — FLUOXETINE HYDROCHLORIDE 40 MG/1
40 CAPSULE ORAL DAILY
Qty: 30 CAPSULE | Refills: 0 | Status: SHIPPED | OUTPATIENT
Start: 2018-04-25 | End: 2018-05-22 | Stop reason: SDUPTHER

## 2018-04-25 RX ORDER — QUETIAPINE FUMARATE 400 MG/1
TABLET, FILM COATED ORAL
Qty: 30 TABLET | Refills: 3 | Status: SHIPPED | OUTPATIENT
Start: 2018-04-25 | End: 2018-07-20

## 2018-04-30 ENCOUNTER — TELEPHONE (OUTPATIENT)
Dept: HEPATOLOGY | Facility: CLINIC | Age: 43
End: 2018-04-30

## 2018-04-30 ENCOUNTER — PATIENT MESSAGE (OUTPATIENT)
Dept: HEPATOLOGY | Facility: CLINIC | Age: 43
End: 2018-04-30

## 2018-04-30 NOTE — TELEPHONE ENCOUNTER
I spoke with patient. She asked that 5/2 biopsy be cancelled.  She states that she will call us back to reschedule.  I spoke with Ursula in US and Angelica in DOS and procedure cancelled.

## 2018-05-01 ENCOUNTER — TELEPHONE (OUTPATIENT)
Dept: INTERNAL MEDICINE | Facility: CLINIC | Age: 43
End: 2018-05-01

## 2018-05-01 RX ORDER — BUTALBITAL, ACETAMINOPHEN AND CAFFEINE 50; 325; 40 MG/1; MG/1; MG/1
TABLET ORAL
Qty: 90 TABLET | Refills: 0 | Status: SHIPPED | OUTPATIENT
Start: 2018-05-01 | End: 2018-12-18 | Stop reason: SDUPTHER

## 2018-05-01 RX ORDER — HYDROXYZINE HYDROCHLORIDE 25 MG/1
TABLET, FILM COATED ORAL
Qty: 60 TABLET | Refills: 0 | Status: SHIPPED | OUTPATIENT
Start: 2018-05-01 | End: 2018-08-16

## 2018-05-14 RX ORDER — QUETIAPINE FUMARATE 400 MG/1
TABLET, FILM COATED ORAL
Qty: 30 TABLET | Refills: 0 | Status: SHIPPED | OUTPATIENT
Start: 2018-05-14 | End: 2018-06-29 | Stop reason: SDUPTHER

## 2018-05-16 RX ORDER — TIZANIDINE 4 MG/1
TABLET ORAL
Qty: 90 TABLET | Refills: 0 | Status: SHIPPED | OUTPATIENT
Start: 2018-05-16 | End: 2018-06-29 | Stop reason: SDUPTHER

## 2018-05-22 ENCOUNTER — PATIENT MESSAGE (OUTPATIENT)
Dept: HEPATOLOGY | Facility: CLINIC | Age: 43
End: 2018-05-22

## 2018-05-22 RX ORDER — FLUOXETINE HYDROCHLORIDE 40 MG/1
CAPSULE ORAL
Qty: 30 CAPSULE | Refills: 3 | Status: SHIPPED | OUTPATIENT
Start: 2018-05-22 | End: 2018-08-16

## 2018-05-23 ENCOUNTER — TELEPHONE (OUTPATIENT)
Dept: HEPATOLOGY | Facility: CLINIC | Age: 43
End: 2018-05-23

## 2018-05-23 ENCOUNTER — PATIENT MESSAGE (OUTPATIENT)
Dept: INTERNAL MEDICINE | Facility: CLINIC | Age: 43
End: 2018-05-23

## 2018-05-23 RX ORDER — OXYCODONE AND ACETAMINOPHEN 10; 325 MG/1; MG/1
1 TABLET ORAL EVERY 6 HOURS PRN
Qty: 120 TABLET | Refills: 0 | Status: SHIPPED | OUTPATIENT
Start: 2018-05-23 | End: 2018-06-29 | Stop reason: SDUPTHER

## 2018-05-23 NOTE — TELEPHONE ENCOUNTER
----- Message from Sadia Carrera RN sent at 5/23/2018  2:15 PM CDT -----  Patient ordered to have liver biopsy.  She cancelled scheduled procedure on 5/2.  I spoke with her today.  She states that she's about to be admitted in a mental facility and will call us back after discharge approximately 7/4/18 for rescheduling.

## 2018-05-29 RX ORDER — IBUPROFEN 800 MG/1
TABLET ORAL
Qty: 90 TABLET | Refills: 0 | Status: SHIPPED | OUTPATIENT
Start: 2018-05-29 | End: 2018-08-31 | Stop reason: SDUPTHER

## 2018-06-29 DIAGNOSIS — M54.2 CERVICALGIA: ICD-10-CM

## 2018-06-29 DIAGNOSIS — M54.42 LOW BACK PAIN WITH LEFT-SIDED SCIATICA, UNSPECIFIED BACK PAIN LATERALITY, UNSPECIFIED CHRONICITY: Primary | ICD-10-CM

## 2018-06-29 RX ORDER — OXYCODONE AND ACETAMINOPHEN 10; 325 MG/1; MG/1
1 TABLET ORAL EVERY 6 HOURS PRN
Qty: 120 TABLET | Refills: 0 | Status: SHIPPED | OUTPATIENT
Start: 2018-06-29 | End: 2018-07-30 | Stop reason: SDUPTHER

## 2018-06-29 RX ORDER — TIZANIDINE 4 MG/1
TABLET ORAL
Qty: 90 TABLET | Refills: 0 | Status: SHIPPED | OUTPATIENT
Start: 2018-06-29 | End: 2018-07-27 | Stop reason: SDUPTHER

## 2018-06-29 RX ORDER — QUETIAPINE FUMARATE 200 MG/1
200 TABLET, FILM COATED ORAL DAILY
Qty: 30 TABLET | Refills: 0 | Status: SHIPPED | OUTPATIENT
Start: 2018-06-29 | End: 2018-07-20

## 2018-06-29 RX ORDER — LAMOTRIGINE 200 MG/1
200 TABLET ORAL 2 TIMES DAILY
Qty: 180 TABLET | Refills: 3 | Status: SHIPPED | OUTPATIENT
Start: 2018-06-29 | End: 2018-12-28 | Stop reason: SDUPTHER

## 2018-06-29 RX ORDER — QUETIAPINE FUMARATE 400 MG/1
TABLET, FILM COATED ORAL
Qty: 30 TABLET | Refills: 0 | Status: SHIPPED | OUTPATIENT
Start: 2018-06-29 | End: 2018-07-20

## 2018-07-03 ENCOUNTER — PATIENT MESSAGE (OUTPATIENT)
Dept: HEPATOLOGY | Facility: CLINIC | Age: 43
End: 2018-07-03

## 2018-07-05 ENCOUNTER — TELEPHONE (OUTPATIENT)
Dept: HEPATOLOGY | Facility: CLINIC | Age: 43
End: 2018-07-05

## 2018-07-05 DIAGNOSIS — B18.2 CHRONIC HEPATITIS C WITHOUT HEPATIC COMA: Primary | ICD-10-CM

## 2018-07-09 DIAGNOSIS — B18.2 CHRONIC HEPATITIS C WITHOUT HEPATIC COMA: Primary | ICD-10-CM

## 2018-07-12 NOTE — NURSING
Contacted patient to go over instructions for scheduled CT Liver biopsy on Friday, July 20th  @ 11:00 am. Patient instructed to arrive no later than 10:00 am to outpatient registration. Patient states she will have her required PT/INR and CBC labs drawn on Saturday, July 14th.  Pt is not currently taking aspirin or any blood thinners. Patient instructed to have nothing to eat or drink after midnight the night before the procedure except, a sip of water with essential medications the morning of.  Instructed patient to bathe the night before and morning of procedure with anti bacterial soap or Hibiclens. Instructed to patient to make arrangements in advance for transportation home by a responsible adult. Patient educated on all additional pre-op instructions per policy. Pt verbalized understanding.

## 2018-07-14 ENCOUNTER — HOSPITAL ENCOUNTER (OUTPATIENT)
Dept: RADIOLOGY | Facility: HOSPITAL | Age: 43
Discharge: HOME | End: 2018-07-14
Attending: PHYSICIAN ASSISTANT
Payer: MEDICAID

## 2018-07-14 DIAGNOSIS — B18.2 CHRONIC HEPATITIS C WITHOUT HEPATIC COMA: ICD-10-CM

## 2018-07-14 PROCEDURE — 76700 US EXAM ABDOM COMPLETE: CPT | Mod: TC

## 2018-07-14 PROCEDURE — 76700 US EXAM ABDOM COMPLETE: CPT | Mod: 26,,, | Performed by: RADIOLOGY

## 2018-07-16 ENCOUNTER — TELEPHONE (OUTPATIENT)
Dept: HEPATOLOGY | Facility: CLINIC | Age: 43
End: 2018-07-16

## 2018-07-16 NOTE — TELEPHONE ENCOUNTER
----- Message from Redd Humphreys PA-C sent at 7/16/2018  3:55 PM CDT -----  Please let her know that labs and U/S are stable. She should proceed with biopsy as scheduled  Thanks

## 2018-07-20 ENCOUNTER — HOSPITAL ENCOUNTER (OUTPATIENT)
Dept: RADIOLOGY | Facility: HOSPITAL | Age: 43
Discharge: HOME | End: 2018-07-20
Attending: PHYSICIAN ASSISTANT
Payer: MEDICAID

## 2018-07-20 VITALS
DIASTOLIC BLOOD PRESSURE: 74 MMHG | TEMPERATURE: 97 F | OXYGEN SATURATION: 99 % | SYSTOLIC BLOOD PRESSURE: 106 MMHG | WEIGHT: 175 LBS | RESPIRATION RATE: 14 BRPM | HEART RATE: 62 BPM | HEIGHT: 61 IN | BODY MASS INDEX: 33.04 KG/M2

## 2018-07-20 DIAGNOSIS — K74.00 LIVER FIBROSIS: ICD-10-CM

## 2018-07-20 DIAGNOSIS — Z00.00 ROUTINE GENERAL MEDICAL EXAMINATION AT A HEALTH CARE FACILITY: ICD-10-CM

## 2018-07-20 DIAGNOSIS — B18.2 CHRONIC HEPATITIS C WITHOUT HEPATIC COMA: ICD-10-CM

## 2018-07-20 PROCEDURE — 63600175 PHARM REV CODE 636 W HCPCS

## 2018-07-20 PROCEDURE — 25000003 PHARM REV CODE 250

## 2018-07-20 PROCEDURE — 88307 TISSUE EXAM BY PATHOLOGIST: CPT | Mod: 26,,, | Performed by: PATHOLOGY

## 2018-07-20 PROCEDURE — 88307 TISSUE EXAM BY PATHOLOGIST: CPT | Performed by: PATHOLOGY

## 2018-07-20 PROCEDURE — 47000 NEEDLE BIOPSY OF LIVER PERQ: CPT | Mod: RT,,, | Performed by: RADIOLOGY

## 2018-07-20 PROCEDURE — 47000 NEEDLE BIOPSY OF LIVER PERQ: CPT | Mod: RT

## 2018-07-20 PROCEDURE — 99900103 DSU ONLY-NO CHARGE-INITIAL HR (STAT): Performed by: PHYSICIAN ASSISTANT

## 2018-07-20 PROCEDURE — 99900104 DSU ONLY-NO CHARGE-EA ADD'L HR (STAT): Performed by: PHYSICIAN ASSISTANT

## 2018-07-20 PROCEDURE — 77012 CT SCAN FOR NEEDLE BIOPSY: CPT | Mod: TC

## 2018-07-20 PROCEDURE — 25000003 PHARM REV CODE 250: Performed by: RADIOLOGY

## 2018-07-20 PROCEDURE — 77012 CT SCAN FOR NEEDLE BIOPSY: CPT | Mod: 26,,, | Performed by: RADIOLOGY

## 2018-07-20 PROCEDURE — 88313 SPECIAL STAINS GROUP 2: CPT | Performed by: PATHOLOGY

## 2018-07-20 PROCEDURE — 88313 SPECIAL STAINS GROUP 2: CPT | Mod: 26,,, | Performed by: PATHOLOGY

## 2018-07-20 RX ORDER — LIDOCAINE HYDROCHLORIDE 10 MG/ML
INJECTION, SOLUTION EPIDURAL; INFILTRATION; INTRACAUDAL; PERINEURAL
Status: COMPLETED
Start: 2018-07-20 | End: 2018-07-20

## 2018-07-20 RX ORDER — OXYCODONE HYDROCHLORIDE 5 MG/1
5 TABLET ORAL ONCE
Status: COMPLETED | OUTPATIENT
Start: 2018-07-20 | End: 2018-07-20

## 2018-07-20 RX ORDER — TOPIRAMATE 50 MG/1
50 TABLET, FILM COATED ORAL DAILY
COMMUNITY
End: 2018-08-16

## 2018-07-20 RX ORDER — MIDAZOLAM HYDROCHLORIDE 2 MG/2ML
1 INJECTION, SOLUTION INTRAMUSCULAR; INTRAVENOUS
Status: DISCONTINUED | OUTPATIENT
Start: 2018-07-20 | End: 2018-07-20

## 2018-07-20 RX ORDER — MIDAZOLAM HYDROCHLORIDE 1 MG/ML
INJECTION, SOLUTION INTRAMUSCULAR; INTRAVENOUS
Status: COMPLETED
Start: 2018-07-20 | End: 2018-07-20

## 2018-07-20 RX ORDER — LISDEXAMFETAMINE DIMESYLATE CAPSULES 20 MG/1
20 CAPSULE ORAL EVERY MORNING
COMMUNITY
End: 2018-08-16

## 2018-07-20 RX ORDER — FENTANYL CITRATE 50 UG/ML
25 INJECTION, SOLUTION INTRAMUSCULAR; INTRAVENOUS
Status: DISCONTINUED | OUTPATIENT
Start: 2018-07-20 | End: 2018-07-20

## 2018-07-20 RX ORDER — LIDOCAINE HYDROCHLORIDE 10 MG/ML
1 INJECTION, SOLUTION EPIDURAL; INFILTRATION; INTRACAUDAL; PERINEURAL ONCE
Status: COMPLETED | OUTPATIENT
Start: 2018-07-20 | End: 2018-07-20

## 2018-07-20 RX ORDER — FENTANYL CITRATE 50 UG/ML
INJECTION, SOLUTION INTRAMUSCULAR; INTRAVENOUS
Status: COMPLETED
Start: 2018-07-20 | End: 2018-07-20

## 2018-07-20 RX ADMIN — OXYCODONE HYDROCHLORIDE 5 MG: 5 TABLET ORAL at 02:07

## 2018-07-20 RX ADMIN — LIDOCAINE HYDROCHLORIDE 10 MG: 10 INJECTION, SOLUTION EPIDURAL; INFILTRATION; INTRACAUDAL; PERINEURAL at 11:07

## 2018-07-20 RX ADMIN — FENTANYL CITRATE 25 MCG: 50 INJECTION INTRAMUSCULAR; INTRAVENOUS at 11:07

## 2018-07-20 RX ADMIN — MIDAZOLAM HYDROCHLORIDE 2 MG: 2 INJECTION, SOLUTION INTRAMUSCULAR; INTRAVENOUS at 11:07

## 2018-07-20 RX ADMIN — MIDAZOLAM HYDROCHLORIDE 2 MG: 1 INJECTION INTRAMUSCULAR; INTRAVENOUS at 11:07

## 2018-07-20 RX ADMIN — OXYCODONE HYDROCHLORIDE 5 MG: 5 TABLET ORAL at 12:07

## 2018-07-20 RX ADMIN — FENTANYL CITRATE 25 MCG: 50 INJECTION, SOLUTION INTRAMUSCULAR; INTRAVENOUS at 11:07

## 2018-07-20 NOTE — PROGRESS NOTES
Radiology Post-Procedure Note    Pre Op Diagnosis: abnormal liver function  Post Op Diagnosis: Same    Procedure: ct guided percutaneous liver biopsy    Procedure performed by: Dr. Sweetie Allen    Written Informed Consent Obtained: Yes  Specimen Removed: YES sent to lab  Estimated Blood Loss: Minimal  Sedation time 43 minutes, patient monitored including PO2, pulse, RR, HR BP by myself and the radiology nurse    Findings:   Successful percutaneous CT guided biopsy of the liver. Patient to be monitored on the floor prior to discharge home to follow up with. Redd Humphreys    Patient tolerated procedure well.    @SIG@

## 2018-07-20 NOTE — DISCHARGE INSTRUCTIONS
Discharge Instructions for Liver Biopsy  You had a procedure called liver biopsy. A healthcare provider used a special needle to remove a small piece of tissue from your liver. Then it was examined for signs of damage or disease. A liver biopsy is ordered after other tests have shown that your liver is not working properly. You may also have a liver biopsy when liver disease is suspected, to determine whether there is too much iron in the liver, or to rule out cancer.  Home care  Recommendations include the following:   · Because you had anesthesia, you should not drive until the day after your biopsy.   · Remove the bandage covering the biopsy site 48 hours after the procedure.  · Rest for 6 hours and take it easy when you arrive home.  · Dont shower for 24 hours after the biopsy. If you wish, you may wash yourself with a sponge or washcloth. When you are able to shower, dont scrub the site. Gently wash the area and pat it dry.  · Dont lift anything heavier than 10 pounds for up to 1 week after the procedure, or as advised by your healthcare provider.  · Don't do strenuous activities or exercises for up to 1 week after the procedure.  · Ask your healthcare provider when you can return to work.  · Do not start taking blood thinners without clear instructions from your healthcare provider.  Follow-up care  Make a follow-up appointment as directed by our staff.     When to call your healthcare provider  Call your healthcare provider immediately if you have any of the following:  · Bleeding from the biopsy site  · Dizziness or lightheadedness  · Sudden or increased shortness of breath  · Sudden chest pain  · Fever of 100.4°F (38.0°C) or higher, or as directed by your healthcare provider  · Shaking chills  · Yellow eyes or skin  · Increasing redness, tenderness, or swelling at the biopsy site  · Drainage from the biopsy site  · Opening of the biopsy site  · Vomiting blood  · Rectal bleeding or bloody  "stools  · Increasing pain, with or without activity, in the liver or belly area, or pain shooting to the right shoulder   Date Last Reviewed: 7/1/2016  © 8224-6381 [employer]. [address] [place] [address], [place] [address]. All rights reserved. This information is not intended as a substitute for professional medical care. Always follow your healthcare professional's instructions.        Discharge Instructions: After Your Surgery/Procedure  Youve just had surgery. During surgery you were given medicine called anesthesia to keep you relaxed and free of pain. After surgery you may have some pain or nausea. This is common. Here are some tips for feeling better and getting well after surgery.     Stay on schedule with your medication.   Going home  Your doctor or nurse will show you how to take care of yourself when you go home. He or she will also answer your questions. Have an adult family member or friend drive you home.      For your safety we recommend these precaution for the first 24 hours after your procedure:  · Do not drive or use heavy equipment.  · Do not make important decisions or sign legal papers.  · Do not drink alcohol.  · Have someone stay with you, if needed. He or she can watch for problems and help keep you safe.  · Your concentration, balance, coordination, and judgement may be impaired for many hours after anesthesia.  Use caution when ambulating or standing up.     · You may feel weak and "washed out" after anesthesia and surgery.      Subtle residual effects of general anesthesia or sedation with regional / local anesthesia can last more than 24 hours.  Rest for the remainder of the day or longer if your Doctor/Surgeon has advised you to do so.  Although you may feel normal within the first 24 hours, your reflexes and mental ability may be impaired without you realizing it.  You may feel dizzy, lightheaded or sleepy for 24 hours or longer.      Be sure to go to all follow-up visits " with your doctor. And rest after your surgery for as long as your doctor tells you to.  Coping with pain  If you have pain after surgery, pain medicine will help you feel better. Take it as told, before pain becomes severe. Also, ask your doctor or pharmacist about other ways to control pain. This might be with heat, ice, or relaxation. And follow any other instructions your surgeon or nurse gives you.  Tips for taking pain medicine  To get the best relief possible, remember these points:  · Pain medicines can upset your stomach. Taking them with a little food may help.  · Most pain relievers taken by mouth need at least 20 to 30 minutes to start to work.  · Taking medicine on a schedule can help you remember to take it. Try to time your medicine so that you can take it before starting an activity. This might be before you get dressed, go for a walk, or sit down for dinner.  · Constipation is a common side effect of pain medicines. Call your doctor before taking any medicines such as laxatives or stool softeners to help ease constipation. Also ask if you should skip any foods. Drinking lots of fluids and eating foods such as fruits and vegetables that are high in fiber can also help. Remember, do not take laxatives unless your surgeon has prescribed them.  · Drinking alcohol and taking pain medicine can cause dizziness and slow your breathing. It can even be deadly. Do not drink alcohol while taking pain medicine.  · Pain medicine can make you react more slowly to things. Do not drive or run machinery while taking pain medicine.  Your health care provider may tell you to take acetaminophen to help ease your pain. Ask him or her how much you are supposed to take each day. Acetaminophen or other pain relievers may interact with your prescription medicines or other over-the-counter (OTC) drugs. Some prescription medicines have acetaminophen and other ingredients. Using both prescription and OTC acetaminophen for  pain can cause you to overdose. Read the labels on your OTC medicines with care. This will help you to clearly know the list of ingredients, how much to take, and any warnings. It may also help you not take too much acetaminophen. If you have questions or do not understand the information, ask your pharmacist or health care provider to explain it to you before you take the OTC medicine.  Managing nausea  Some people have an upset stomach after surgery. This is often because of anesthesia, pain, or pain medicine, or the stress of surgery. These tips will help you handle nausea and eat healthy foods as you get better. If you were on a special food plan before surgery, ask your doctor if you should follow it while you get better. These tips may help:  · Do not push yourself to eat. Your body will tell you when to eat and how much.  · Start off with clear liquids and soup. They are easier to digest.  · Next try semi-solid foods, such as mashed potatoes, applesauce, and gelatin, as you feel ready.  · Slowly move to solid foods. Dont eat fatty, rich, or spicy foods at first.  · Do not force yourself to have 3 large meals a day. Instead eat smaller amounts more often.  · Take pain medicines with a small amount of solid food, such as crackers or toast, to avoid nausea.     Call your surgeon if  · You still have pain an hour after taking medicine. The medicine may not be strong enough.  · You feel too sleepy, dizzy, or groggy. The medicine may be too strong.  · You have side effects like nausea, vomiting, or skin changes, such as rash, itching, or hives.       If you have obstructive sleep apnea  You were given anesthesia medicine during surgery to keep you comfortable and free of pain. After surgery, you may have more apnea spells because of this medicine and other medicines you were given. The spells may last longer than usual.   At home:  · Keep using the continuous positive airway pressure (CPAP) device when you sleep.  Unless your health care provider tells you not to, use it when you sleep, day or night. CPAP is a common device used to treat obstructive sleep apnea.  · Talk with your provider before taking any pain medicine, muscle relaxants, or sedatives. Your provider will tell you about the possible dangers of taking these medicines.  © 5593-8054 [employer]. [address] [place] [address], [place] [address]. All rights reserved. This information is not intended as a substitute for professional medical care. Always follow your healthcare professional's instructions.

## 2018-07-20 NOTE — NURSING
Pt arrived via stretcher from [facility]. AAOx4, anxious- procedure explained and consent obtained by Dr. Allen. Time out performed. Pt received Fentanyl  50 mcg and Versed 2 mg IV. Vital signs monitored and remained stable for duration of procedure. Biopsies collected By . Specimens submitted to lab for Pathology. Bandage applied to pts Right lateral abdomen. CDI- Instructed by Chen Charge nurse to give bedside report. Pt transported back to post op via bed.

## 2018-07-20 NOTE — PLAN OF CARE
1630- removed iv jelco from right hand, cath intact placed gauze drg over site with coban  Right upper abd bandage is dry and intact  Pt states feels so much better, is ready to go eat  eill wheel to car via wheelchair when able

## 2018-07-20 NOTE — PROGRESS NOTES
Radiology Discharge Summary      Admit date: 7/20/2018  9:17 AM  Discharge date: July 20, 2018    Instructions Given to patient: YesVerbal    Diet: Regular    Activity:Restriction as listed: no heavy activity for 48 hours    Medications on discharge (List): Refer to Discharge Medication List    Hospital Course: percutaneous CT guided biopsy of liver without immediate complication    Description of Condition on Discharge: stable    Discharge Disposition: Home    Discharge Diagnosis: post percutaneous CT guided biopsy of liver    Patient to Follow up with Dr. Humphreys

## 2018-07-20 NOTE — NURSING
Post op RN Mary Kate reports that patient is experiencing pain level 7/10 after administration of ordered one time dose of pain medicine. Per Dr Bishop hightower to reorder second dose.

## 2018-07-20 NOTE — PROGRESS NOTES
Escorted to exit via w/c and discharged home with friend per private vehicle.  No change in condition

## 2018-07-20 NOTE — PROGRESS NOTES
Radiology History and physical prior to percutaneous liver biopsy  Patient with history of hepatitis C, presents for percutaneous CT guided biopsy liver, She states no health changes since she saw the hepatologist last spring. No history of recent fever, chest pain,. Only left upper quadrant discomfort that is gradually worsening  Vital signs and medications as recorded by radiology nurse  Lungs clear  Heart regular  Abdomen soft and nontender  Neck supple  Moves all extremities easily  [place] 2  ASA 1  No personal or family history of adverse reaction to anesthesia  No contraindication to proceed with percutaneous liver biopsy

## 2018-07-20 NOTE — PROGRESS NOTES
Discharge instructions given to pt and friend who voice understanding.  Taking po without nausea.  Dressing to RUQ of abd dry and intact.  Pain 2/10 and tolerable per pt.

## 2018-07-28 RX ORDER — TIZANIDINE 4 MG/1
TABLET ORAL
Qty: 90 TABLET | Refills: 0 | Status: SHIPPED | OUTPATIENT
Start: 2018-07-28 | End: 2018-08-16

## 2018-07-30 ENCOUNTER — PATIENT MESSAGE (OUTPATIENT)
Dept: HEPATOLOGY | Facility: CLINIC | Age: 43
End: 2018-07-30

## 2018-07-30 DIAGNOSIS — M54.2 CERVICALGIA: ICD-10-CM

## 2018-07-30 DIAGNOSIS — M54.42 LOW BACK PAIN WITH LEFT-SIDED SCIATICA, UNSPECIFIED BACK PAIN LATERALITY, UNSPECIFIED CHRONICITY: ICD-10-CM

## 2018-07-30 RX ORDER — OXYCODONE AND ACETAMINOPHEN 10; 325 MG/1; MG/1
1 TABLET ORAL EVERY 6 HOURS PRN
Qty: 120 TABLET | Refills: 0 | Status: SHIPPED | OUTPATIENT
Start: 2018-07-30 | End: 2018-08-16 | Stop reason: SDUPTHER

## 2018-07-30 NOTE — TELEPHONE ENCOUNTER
Pathology staff contacted.  Result pending but msg will be sent to path MD regarding request for result.

## 2018-07-30 NOTE — TELEPHONE ENCOUNTER
One refill provided.  Patient is due for 6 month follow-up to continue getting narcotics.     checked.  No suspicious activity noted.  Refill done.

## 2018-08-07 RX ORDER — PROMETHAZINE HYDROCHLORIDE 25 MG/1
TABLET ORAL
Qty: 30 TABLET | Refills: 0 | Status: SHIPPED | OUTPATIENT
Start: 2018-08-07 | End: 2018-09-18 | Stop reason: SDUPTHER

## 2018-08-08 ENCOUNTER — TELEPHONE (OUTPATIENT)
Dept: HEPATOLOGY | Facility: CLINIC | Age: 43
End: 2018-08-08

## 2018-08-08 NOTE — TELEPHONE ENCOUNTER
LULA Humphreys not in clinic on 10/25.  Attempt made to reach patient.  LVM informing her that scheduled f/u appt with provider and vaccine appt moved to 10/24 because provider not in clinic on 10/25; appt notice mailed along with a note.  I asked that patient call us back to confirm changes.

## 2018-08-16 ENCOUNTER — TELEPHONE (OUTPATIENT)
Dept: INTERNAL MEDICINE | Facility: CLINIC | Age: 43
End: 2018-08-16

## 2018-08-16 ENCOUNTER — OFFICE VISIT (OUTPATIENT)
Dept: INTERNAL MEDICINE | Facility: CLINIC | Age: 43
End: 2018-08-16
Payer: MEDICAID

## 2018-08-16 VITALS
BODY MASS INDEX: 37.55 KG/M2 | SYSTOLIC BLOOD PRESSURE: 128 MMHG | DIASTOLIC BLOOD PRESSURE: 80 MMHG | WEIGHT: 198.88 LBS | HEIGHT: 61 IN | TEMPERATURE: 98 F | HEART RATE: 72 BPM

## 2018-08-16 DIAGNOSIS — M54.2 CERVICALGIA: ICD-10-CM

## 2018-08-16 DIAGNOSIS — M54.42 LOW BACK PAIN WITH LEFT-SIDED SCIATICA, UNSPECIFIED BACK PAIN LATERALITY, UNSPECIFIED CHRONICITY: ICD-10-CM

## 2018-08-16 DIAGNOSIS — F31.9 BIPOLAR AFFECTIVE DISORDER, REMISSION STATUS UNSPECIFIED: ICD-10-CM

## 2018-08-16 DIAGNOSIS — Z00.00 ROUTINE GENERAL MEDICAL EXAMINATION AT A HEALTH CARE FACILITY: Primary | ICD-10-CM

## 2018-08-16 DIAGNOSIS — I10 HYPERTENSION, UNSPECIFIED TYPE: Primary | ICD-10-CM

## 2018-08-16 PROCEDURE — 99999 PR PBB SHADOW E&M-EST. PATIENT-LVL III: CPT | Mod: PBBFAC,,, | Performed by: INTERNAL MEDICINE

## 2018-08-16 PROCEDURE — 99213 OFFICE O/P EST LOW 20 MIN: CPT | Mod: PBBFAC,PO | Performed by: INTERNAL MEDICINE

## 2018-08-16 PROCEDURE — 99214 OFFICE O/P EST MOD 30 MIN: CPT | Mod: S$PBB,,, | Performed by: INTERNAL MEDICINE

## 2018-08-16 RX ORDER — LISDEXAMFETAMINE DIMESYLATE 40 MG/1
CAPSULE ORAL
COMMUNITY
Start: 2018-08-08 | End: 2019-10-22

## 2018-08-16 RX ORDER — TIZANIDINE 4 MG/1
TABLET ORAL
COMMUNITY
End: 2020-09-04 | Stop reason: SDUPTHER

## 2018-08-16 RX ORDER — PROPRANOLOL HYDROCHLORIDE 20 MG/1
TABLET ORAL
COMMUNITY
Start: 2018-08-07 | End: 2019-11-15 | Stop reason: SDUPTHER

## 2018-08-16 RX ORDER — CLONIDINE HYDROCHLORIDE 0.1 MG/1
0.1 TABLET ORAL NIGHTLY
Qty: 30 TABLET | Refills: 11 | Status: SHIPPED | OUTPATIENT
Start: 2018-08-16 | End: 2019-02-20

## 2018-08-16 RX ORDER — OXYCODONE AND ACETAMINOPHEN 10; 325 MG/1; MG/1
1 TABLET ORAL EVERY 6 HOURS PRN
Qty: 120 TABLET | Refills: 0 | Status: SHIPPED | OUTPATIENT
Start: 2018-08-27 | End: 2018-09-21 | Stop reason: SDUPTHER

## 2018-08-16 RX ORDER — ESTRADIOL 2 MG/1
TABLET ORAL
COMMUNITY
End: 2019-02-20

## 2018-08-16 RX ORDER — TOPIRAMATE 25 MG/1
TABLET ORAL
COMMUNITY
Start: 2018-08-08 | End: 2019-02-20

## 2018-08-16 RX ORDER — FLUOXETINE HYDROCHLORIDE 20 MG/1
CAPSULE ORAL
COMMUNITY
Start: 2018-07-24 | End: 2018-12-28 | Stop reason: SDUPTHER

## 2018-08-16 RX ORDER — HYDROXYZINE PAMOATE 50 MG/1
CAPSULE ORAL
COMMUNITY
Start: 2018-07-06 | End: 2019-02-20

## 2018-08-16 RX ORDER — ALBUTEROL SULFATE 90 UG/1
AEROSOL, METERED RESPIRATORY (INHALATION)
Qty: 18 G | Refills: 0 | Status: SHIPPED | OUTPATIENT
Start: 2018-08-16 | End: 2018-09-30 | Stop reason: SDUPTHER

## 2018-08-16 NOTE — LETTER
[facility]  Internal Medicine  [address] [facility]  [place] [address]  Phone: [phone]  Fax: [phone]   August 16, 2018     Patient: Esther Gage   YOB: 1975   Date of Visit: 8/16/2018       To Whom it May Concern:    Esther Gage was seen in my clinic on 8/16/2018. She may return to work according to her schedule   If you have any questions or concerns, please don't hesitate to call.    Sincerely,             Rama Lee MD

## 2018-08-16 NOTE — PATIENT INSTRUCTIONS
Decrease propranolol to 20 mg twice a day x 1 week, then decrease to 20 mg once a day for 1 week, then discontinue propranolol.  Start clonidine at bedtime once you start propranolol once daily.

## 2018-08-16 NOTE — TELEPHONE ENCOUNTER
----- Message from Maria Luz Fabian sent at 8/16/2018 11:05 AM CDT -----  Contact: Esther GUZMAN [phone]  Esther is requesting a Return to school slip for today's visit . Please fax to [phone]

## 2018-08-16 NOTE — PROGRESS NOTES
CC: followup of hypertension  HPI:  The patient is a 43 y.o. year old female who presents to the office for followup of hypertension.  The patient denies any chest pain, shortness of breath or blurred vision, but reports fatigue, nausea, vomiting, and headaches.  She reports anxiety persists, especially while interacting with people.  She recently completed inpatient rehab for alcohol use.  She is in intensive outpatient rehab.  She is 92 days sober.  She is currently seeing a new psychiatrist.  She states she is not sleeping at night.  The patient complains of social anxiety.  The two year anniversary of her brother's death is approaching.  She was his primary caregiver, and feels at a loss since his death.    PAST MEDICAL HISTORY:  Past Medical History:   Diagnosis Date    ADHD     Allergy     Asthma     childhood    Bipolar disorder     Cirrhosis of liver     Gestational diabetes mellitus 2001    resolved    Hepatitis C     HTN (hypertension)     Hyperlipidemia     Interstitial cystitis     Migraine headache     OCD (obsessive compulsive disorder)        SURGICAL HISTORY:  Past Surgical History:   Procedure Laterality Date    ADENOIDECTOMY      bladder lift      CHOLECYSTECTOMY      HYSTERECTOMY      unilateral oophrectomy    OOPHORECTOMY      1 only    OVARIAN CYST REMOVAL      SINUS SURGERY      TONSILLECTOMY         MEDS:  Medcard reviewed and updated    ALLERGIES: Allergy Card reviewed and updated    SOCIAL HISTORY:   The patient is a nonsmoker.    PE:   APPEARANCE: Well nourished, well developed, in no acute distress.    CHEST: Lungs clear to auscultation with unlabored respirations.  CARDIOVASCULAR: Normal S1, S2. No murmurs. No carotid bruits. No pedal edema.  ABDOMEN: Bowel sounds normal. Not distended. Soft. Mild left upper quadrant tenderness.   PSYCHIATRIC: The patient is oriented to person, place, and time and has a pleasant affect.        ASSESSMENT/PLAN:  Esther was seen today  for follow-up.    Diagnoses and all orders for this visit:    Hypertension, unspecified type  -    Blood pressures controlled, mary beta blocker and start clonidine at bedtime secondary to psychiatry recommendations    Bipolar affective disorder, remission status unspecified  -      Follow-up with psychiatry    Low back pain with left-sided sciatica, unspecified back pain laterality, unspecified chronicity  -     oxyCODONE-acetaminophen (PERCOCET)  mg per tablet; Take 1 tablet by mouth every 6 (six) hours as needed for Pain.    Cervicalgia  -     oxyCODONE-acetaminophen (PERCOCET)  mg per tablet; Take 1 tablet by mouth every 6 (six) hours as needed for Pain.    Other orders  -     cloNIDine (CATAPRES) 0.1 MG tablet; Take 1 tablet (0.1 mg total) by mouth every evening.        Answers for HPI/ROS submitted by the patient on 8/15/2018   activity change: No  unexpected weight change: No  neck pain: Yes  hearing loss: No  rhinorrhea: Yes  trouble swallowing: No  eye discharge: No  visual disturbance: No  chest tightness: No  wheezing: No  chest pain: No  palpitations: Yes  blood in stool: No  constipation: No  vomiting: Yes  diarrhea: No  polydipsia: No  polyuria: Yes  difficulty urinating: No  hematuria: No  menstrual problem: No  dysuria: No  joint swelling: Yes  arthralgias: Yes  headaches: Yes  weakness: No  confusion: No  dysphoric mood: Yes

## 2018-08-23 RX ORDER — TIZANIDINE 4 MG/1
TABLET ORAL
Qty: 90 TABLET | Refills: 0 | Status: SHIPPED | OUTPATIENT
Start: 2018-08-23 | End: 2018-09-18 | Stop reason: SDUPTHER

## 2018-08-24 ENCOUNTER — HOSPITAL ENCOUNTER (EMERGENCY)
Facility: HOSPITAL | Age: 43
Discharge: HOME | End: 2018-08-24
Attending: EMERGENCY MEDICINE
Payer: MEDICAID

## 2018-08-24 VITALS
WEIGHT: 195 LBS | SYSTOLIC BLOOD PRESSURE: 165 MMHG | TEMPERATURE: 98 F | BODY MASS INDEX: 36.84 KG/M2 | RESPIRATION RATE: 18 BRPM | OXYGEN SATURATION: 100 % | HEART RATE: 99 BPM | DIASTOLIC BLOOD PRESSURE: 91 MMHG

## 2018-08-24 DIAGNOSIS — G47.00 INSOMNIA, UNSPECIFIED TYPE: Primary | ICD-10-CM

## 2018-08-24 LAB
ALBUMIN SERPL BCP-MCNC: 4.2 G/DL
ALP SERPL-CCNC: 145 U/L
ALT SERPL W/O P-5'-P-CCNC: 137 U/L
AMPHET+METHAMPHET UR QL: NORMAL
ANION GAP SERPL CALC-SCNC: 11 MMOL/L
APAP SERPL-MCNC: <3 UG/ML
AST SERPL-CCNC: 153 U/L
BARBITURATES UR QL SCN>200 NG/ML: NEGATIVE
BASOPHILS # BLD AUTO: 0.1 K/UL
BASOPHILS NFR BLD: 0.9 %
BENZODIAZ UR QL SCN>200 NG/ML: NEGATIVE
BILIRUB SERPL-MCNC: 1.4 MG/DL
BILIRUB UR QL STRIP: NEGATIVE
BUN SERPL-MCNC: 14 MG/DL
BZE UR QL SCN: NEGATIVE
CALCIUM SERPL-MCNC: 9 MG/DL
CANNABINOIDS UR QL SCN: NEGATIVE
CHLORIDE SERPL-SCNC: 105 MMOL/L
CLARITY UR: CLEAR
CO2 SERPL-SCNC: 21 MMOL/L
COLOR UR: YELLOW
CREAT SERPL-MCNC: 0.7 MG/DL
CREAT UR-MCNC: 41.1 MG/DL
DIFFERENTIAL METHOD: ABNORMAL
EOSINOPHIL # BLD AUTO: 0.2 K/UL
EOSINOPHIL NFR BLD: 3 %
ERYTHROCYTE [DISTWIDTH] IN BLOOD BY AUTOMATED COUNT: 12.9 %
EST. GFR  (AFRICAN AMERICAN): >60 ML/MIN/1.73 M^2
EST. GFR  (NON AFRICAN AMERICAN): >60 ML/MIN/1.73 M^2
ETHANOL SERPL-MCNC: <10 MG/DL
GLUCOSE SERPL-MCNC: 114 MG/DL
GLUCOSE UR QL STRIP: NEGATIVE
HCT VFR BLD AUTO: 32.1 %
HGB BLD-MCNC: 10.9 G/DL
HGB UR QL STRIP: NEGATIVE
KETONES UR QL STRIP: NEGATIVE
LEUKOCYTE ESTERASE UR QL STRIP: NEGATIVE
LYMPHOCYTES # BLD AUTO: 1.6 K/UL
LYMPHOCYTES NFR BLD: 21.8 %
MCH RBC QN AUTO: 28.7 PG
MCHC RBC AUTO-ENTMCNC: 34 G/DL
MCV RBC AUTO: 84 FL
METHADONE UR QL SCN>300 NG/ML: NEGATIVE
MONOCYTES # BLD AUTO: 0.5 K/UL
MONOCYTES NFR BLD: 6.5 %
NEUTROPHILS # BLD AUTO: 5.1 K/UL
NEUTROPHILS NFR BLD: 67.8 %
NITRITE UR QL STRIP: NEGATIVE
OPIATES UR QL SCN: NEGATIVE
PCP UR QL SCN>25 NG/ML: NEGATIVE
PH UR STRIP: 6 [PH] (ref 5–8)
PLATELET # BLD AUTO: 271 K/UL
PMV BLD AUTO: 6.5 FL
POTASSIUM SERPL-SCNC: 3.5 MMOL/L
PROT SERPL-MCNC: 6.9 G/DL
PROT UR QL STRIP: NEGATIVE
RBC # BLD AUTO: 3.81 M/UL
SODIUM SERPL-SCNC: 137 MMOL/L
SP GR UR STRIP: <=1.005 (ref 1–1.03)
TOXICOLOGY INFORMATION: NORMAL
TSH SERPL DL<=0.005 MIU/L-ACNC: 3 UIU/ML
URN SPEC COLLECT METH UR: ABNORMAL
UROBILINOGEN UR STRIP-ACNC: NEGATIVE EU/DL
WBC # BLD AUTO: 7.5 K/UL

## 2018-08-24 PROCEDURE — 85025 COMPLETE CBC W/AUTO DIFF WBC: CPT

## 2018-08-24 PROCEDURE — 25000003 PHARM REV CODE 250: Performed by: EMERGENCY MEDICINE

## 2018-08-24 PROCEDURE — 84443 ASSAY THYROID STIM HORMONE: CPT

## 2018-08-24 PROCEDURE — 80307 DRUG TEST PRSMV CHEM ANLYZR: CPT

## 2018-08-24 PROCEDURE — 80053 COMPREHEN METABOLIC PANEL: CPT

## 2018-08-24 PROCEDURE — 81003 URINALYSIS AUTO W/O SCOPE: CPT

## 2018-08-24 PROCEDURE — 80320 DRUG SCREEN QUANTALCOHOLS: CPT

## 2018-08-24 PROCEDURE — 99284 EMERGENCY DEPT VISIT MOD MDM: CPT

## 2018-08-24 PROCEDURE — 80329 ANALGESICS NON-OPIOID 1 OR 2: CPT

## 2018-08-24 PROCEDURE — 36415 COLL VENOUS BLD VENIPUNCTURE: CPT

## 2018-08-24 PROCEDURE — 99282 EMERGENCY DEPT VISIT SF MDM: CPT | Mod: GT,,, | Performed by: PSYCHIATRY & NEUROLOGY

## 2018-08-24 RX ORDER — LORAZEPAM 1 MG/1
1 TABLET ORAL
Status: COMPLETED | OUTPATIENT
Start: 2018-08-24 | End: 2018-08-24

## 2018-08-24 RX ADMIN — LORAZEPAM 1 MG: 1 TABLET ORAL at 08:08

## 2018-08-24 RX ADMIN — LORAZEPAM 1 MG: 1 TABLET ORAL at 09:08

## 2018-08-25 NOTE — ED PROVIDER NOTES
"Encounter Date: 8/24/2018    SCRIBE #1 NOTE: I, Mirta Vieyra , am scribing for, and in the presence of, Dr. Valdez .       History     Chief Complaint   Patient presents with    Manic Behavior     pt denies si / denies hi. but reports severe anxiety and manic behavior.        Time seen by provider: 7:05 PM on 08/24/2018             Esther Gage is a 43 y.o. female with a PMHx of bipolar disorder, OCD, HTN, hepatitis C, who presents to the ED with complaints of anxiety with associated manic behavior, and sleep disturbance with an onset x 5 days PTA. The patient relays that she has been undergoing recent medication changes. She relays that she was taken off of Klonopin, placed on Clonidine, and her Vistaril was increased to 50 mg 1-2 times a day. She states that she has not been able to sleep or think straight in the past 5 days because she is going through a "maniac phase" and is extremely anxious.           For the past two days, the patient has been vomiting any time she eats solid food because her "nerves are so bad." She endorses periods where she will have "fits of crying" followed by "fits of anger." She states, " my brain is moving faster than my mouth can process." She did go to a psychiatric facility once before, when she had "toxic lithium levels." Patient is not currently on Lithium. She reports that nothing happened to cause this maniac episode. She has experianced these symptoms previously with other medication adjustments. She states that she stated "picking at her skin and drawing on herself again in order to illicit emotional relief."  Additionally, patient claims that she has gained 7 pounds in the past 3 days and has some leg swelling. The patient denies suicidal ideation, homicidal ideation, hallucinations,  or any other symptoms at this time. NKDA reported. Patient is a recovering heroine addict. She denies smoking, drinking, or illegal drug use.       The history is provided by the " patient.     Review of patient's allergies indicates:   Allergen Reactions    Adhesive      Past Medical History:   Diagnosis Date    ADHD     Allergy     Asthma     childhood    Bipolar disorder     Cirrhosis of liver     Gestational diabetes mellitus 2001    resolved    Hepatitis C     HTN (hypertension)     Hyperlipidemia     Interstitial cystitis     Migraine headache     OCD (obsessive compulsive disorder)      Past Surgical History:   Procedure Laterality Date    ADENOIDECTOMY      bladder lift      CHOLECYSTECTOMY      HYSTERECTOMY      unilateral oophrectomy    OOPHORECTOMY      1 only    OVARIAN CYST REMOVAL      SINUS SURGERY      TONSILLECTOMY       Family History   Problem Relation Age of Onset    COPD Mother     Hyperlipidemia Mother     Hypertension Mother     Stroke Mother     Depression Mother     Diabetes Father     Hyperlipidemia Father     Hypertension Father     Diabetes Brother     Hyperlipidemia Brother     Hypertension Brother     Kidney disease Brother     Stroke Brother     Skin cancer Maternal Grandfather     ADD / ADHD Neg Hx     Alcohol abuse Neg Hx     Anxiety disorder Neg Hx     Bipolar disorder Neg Hx     Dementia Neg Hx     Drug abuse Neg Hx     OCD Neg Hx     Paranoid behavior Neg Hx     Physical abuse Neg Hx     Schizophrenia Neg Hx     Seizures Neg Hx     Self injury Neg Hx     Sexual abuse Neg Hx     Suicide Neg Hx      Social History     Tobacco Use    Smoking status: Never Smoker    Smokeless tobacco: Never Used   Substance Use Topics    Alcohol use: No    Drug use: No     Review of Systems   Constitutional: Positive for unexpected weight change. Negative for activity change, appetite change, chills, diaphoresis, fatigue and fever.   HENT: Negative for congestion, rhinorrhea, sore throat, trouble swallowing and voice change.    Respiratory: Negative for cough, choking, chest tightness, shortness of breath, wheezing and  "stridor.    Cardiovascular: Positive for leg swelling. Negative for chest pain and palpitations.   Gastrointestinal: Positive for nausea and vomiting. Negative for abdominal distention, abdominal pain, blood in stool, constipation and diarrhea.   Genitourinary: Negative for difficulty urinating, dysuria, flank pain, frequency and hematuria.   Musculoskeletal: Negative for arthralgias, back pain, joint swelling, myalgias, neck pain and neck stiffness.   Skin: Negative for color change and rash.   Neurological: Negative for dizziness, syncope, speech difficulty, weakness, numbness and headaches.   Hematological: Negative for adenopathy. Does not bruise/bleed easily.   Psychiatric/Behavioral: Positive for self-injury ("picking at the skin") and sleep disturbance (x 5 days). Negative for hallucinations and suicidal ideas. Dysphoric mood: manic  The patient is nervous/anxious and is hyperactive ("manic" ).         Denies HI   All other systems reviewed and are negative.      Physical Exam     Initial Vitals [08/24/18 1847]   BP Pulse Resp Temp SpO2   (!) 165/91 99 18 98 °F (36.7 °C) 100 %      MAP       --         Physical Exam    Nursing note and vitals reviewed.  Constitutional: She appears well-developed and well-nourished. She is not diaphoretic. No distress.   HENT:   Head: Normocephalic and atraumatic.   Mouth/Throat: Uvula is midline, oropharynx is clear and moist and mucous membranes are normal.   Eyes: EOM are normal. Pupils are equal, round, and reactive to light.   Pupils are 3 mm round and reactive to light.   Neck: Trachea normal, normal range of motion and full passive range of motion without pain. Neck supple. No tracheal deviation present.   Cardiovascular: Normal rate, regular rhythm and normal heart sounds. Exam reveals no gallop and no friction rub.    No murmur heard.  Pulses:       Radial pulses are 2+ on the right side, and 2+ on the left side.        Posterior tibial pulses are 2+ on the right " side, and 2+ on the left side.   Pulmonary/Chest: Breath sounds normal. She has no wheezes. She has no rhonchi. She has no rales.   Abdominal: Soft. Bowel sounds are normal. She exhibits no distension. There is no hepatomegaly. There is no tenderness. There is no rigidity, no rebound, no guarding and no CVA tenderness.   Musculoskeletal:        Right lower leg: She exhibits no edema.        Left lower leg: She exhibits no edema.   No step-off, deformity, or bony tenderness of the spine. Full ROM in LE's. Negative straight leg raise, bilaterally.    Lymphadenopathy:     She has no cervical adenopathy.   Neurological: She is alert and oriented to person, place, and time. She has normal strength. No cranial nerve deficit or sensory deficit.   Skin: Skin is warm and dry.   Psychiatric: Her mood appears anxious. Her speech is rapid and/or pressured. She is not actively hallucinating. She expresses no homicidal and no suicidal ideation. She expresses no suicidal plans and no homicidal plans.   Appears gravely disabled due to her description.         ED Course   Procedures  Labs Reviewed   CBC W/ AUTO DIFFERENTIAL - Abnormal; Notable for the following components:       Result Value    RBC 3.81 (*)     Hemoglobin 10.9 (*)     Hematocrit 32.1 (*)     MPV 6.5 (*)     All other components within normal limits   COMPREHENSIVE METABOLIC PANEL - Abnormal; Notable for the following components:    CO2 21 (*)     Glucose 114 (*)     Total Bilirubin 1.4 (*)     Alkaline Phosphatase 145 (*)      (*)      (*)     All other components within normal limits   URINALYSIS, REFLEX TO URINE CULTURE - Abnormal; Notable for the following components:    Specific Gravity, UA <=1.005 (*)     All other components within normal limits    Narrative:     Preferred Collection Type->Urine, Clean Catch   ACETAMINOPHEN LEVEL - Abnormal; Notable for the following components:    Acetaminophen (Tylenol), Serum <3.0 (*)     All other components  within normal limits   TSH   DRUG SCREEN PANEL, URINE EMERGENCY    Narrative:     Preferred Collection Type->Urine, Clean Catch   ALCOHOL,MEDICAL (ETHANOL)          Imaging Results    None          Medical Decision Making:   History:   Old Medical Records: I decided to obtain old medical records.  Clinical Tests:   Lab Tests: Ordered and Reviewed  ED Management:  This is an emergent evaluation of an 43 y.o. presenting with bipolar disorder with rapid cycling. The patient reports that she has been very maniac, has not been able to sleep in 5 days, and unable to eat for 2 days. She reports that she cannot think straight because her mind is racing. She's had recent medication changes made by her psychiatrist. Patient will receive medical screening labs, be placed on a PEC, and transferred to a psychiatric facility once she is medically cleared.      10:20 PM Consulted Dr. Jordan via telepscyh. He saw and examined the patient. He reports that he believes we can rescind the PEC and d/c the patient home to take her Klonopin for sedation and anxiety which she reports she already has plenty of at home. She is to follow up Monday 8/27/2018 with her psychiatrist, as scheduled. He does not feel that he patient is gravely disabled, danger to herself, or others. Patient verbally contracts to safety and patient's mother is with her and is willing to bring her home and care for her she does not feel that patient is a danger to herself or others and she reports she will not let patient operate a motor vehicle while on these medications. I will discharge the patient based on this recommendation. Patient is cautioned to return to the ED for any worsening symptoms.             Scribe Attestation:   Scribe #1: I performed the above scribed service and the documentation accurately describes the services I performed. I attest to the accuracy of the note.               Clinical Impression:   The encounter diagnosis was Insomnia,  unspecified type.      Disposition:   Disposition: Transferred                I, Dr. Jimi Valdez, personally performed the services described in this documentation. All medical record entries made by the scribe were at my direction and in my presence.  I have reviewed the chart and agree that the record reflects my personal performance and is accurate and complete. Jimi Shea MD.               Jimi Valdez MD  08/24/18 1462

## 2018-08-25 NOTE — CONSULTS
Tele-Consultation to Emergency Department from Psychiatry    Patient agreeable to consultation via telepsychiatry.    Consultation started: 8/24/2018 at 9:45 pm  The chief complaint leading to psychiatric consultation is: anxiety, insomnia  This consultation was requested by Dr. Jimi Valdez, the Emergency Department attending physician.  The location of the consulting psychiatrist is [facility]  The patient location is [place]  The patient arrived at the ED at: see triage note  Also present with the patient at the time of the consultation: patient's fiance and mother    Patient Identification:  Esther Gage is a 43 y.o. female.    Patient information was obtained from patient and fiance  Patient presented voluntarily     History of Present Illness:  Esther Gage is a 43 year old female with history of bipolar disorder and heroin use disorder (in remission, 100 days clean) who presents to ED due to concerns for insomnia and worries that this will trigger rhonda. She has been in IOP program and psychiatrist at the program has tapered her off Ambien and Klonopin and started her on Clonidine and Vistaril. She reports she has not slept well in several days and has had increased anxiety. She is about to finish the outpatient program and will followup with her regular outpatient psychiatrist in three days. Called psychiatrist office and he was out of town and patient asked if ok to restart Klonopin to help her sleep as she has pills at home. Office staff said to come to ED for further evaluation. On exam patient is coherent, calm, and cooperative but anxious about not being able to sleep. She denies SI/HI and states she has been compliant with all meds. She denies AH, VH, bizarre behavior. Fiance and mom at bedside corroborate her story.    Psychiatric History:   Hospitalization: No  Medication Trials: Yes  Suicide Attempts: No  Violence: no  Depression: yes  Rhonda: yes  AH's: no  Delusions: no    Review of  "Systems:  Negative except as mentioned elsewhere    Past Medical History:   Past Medical History:   Diagnosis Date    ADHD     Allergy     Asthma     childhood    Bipolar disorder     Cirrhosis of liver     Gestational diabetes mellitus 2001    resolved    Hepatitis C     HTN (hypertension)     Hyperlipidemia     Interstitial cystitis     Migraine headache     OCD (obsessive compulsive disorder)         Allergies: see below  Review of patient's allergies indicates:   Allergen Reactions    Adhesive        Medications in ER:   Medications   LORazepam tablet 1 mg (1 mg Oral Given 8/24/18 2011)   LORazepam tablet 1 mg (1 mg Oral Given 8/24/18 2121)       Medications at home:   Lamictal 200 mg BID  Latuda 80 mg qAM  Vistaril 100 mg TID PRN  Clonidine 0.1 mg qHS  Vyvanse 40 mg qAM    Substance Abuse History:   Alchohol: denies  Drug: past heroin use, 100 days clean    Legal History:   Past charges/incarcerations: past possession  Pending charges: denies    Family Psychiatric History: denies    Social History:    Employment/Disability: on disability  Financial: disability income  Relationship Status/Sexual Orientation: engaged  Children: 18 y/o son who lives with his father  Housing Status: lives with [facility] and her mother   History: no  Access to Gun: denies    Current Evaluation:     Constitutional  Vitals:  Vitals:    08/24/18 1847   BP: (!) 165/91   Pulse: 99   Resp: 18   Temp: 98 °F (36.7 °C)   TempSrc: Oral   SpO2: 100%   Weight: 88.5 kg (195 lb)      General:  unremarkable, age appropriate     Musculoskeletal  Muscle Strength/Tone:   moving arms normally   Gait & Station:   sitting on stretcher     Psychiatric  Level of Consciousness: alert  Orientation: oriented to person, place and time  Grooming: in hospital gown  Psychomotor Behavior: no agitation  Speech: normal in rate, rhythm and volume  Language: uses words appropriately  Mood: "ok"  Affect: anxious  Thought Process: " logical  Associations: intact  Thought Content: no SI/HI, no AVH, no delusions  Memory: intact  Attention: intact to interview  Fund of Knowledge: appears adequate  Insight: good  Judgement: good    Relevant Elements of Neurological Exam: no abnormality of posture noted    Assessment - Diagnosis - Goals:     Diagnosis/Impression:   Patient is anxious on exam and has a history of bipolar disorder. She is currently experiencing insomnia and anxiety but is not acutely manic, psychotic, or depressed. She is not having any SI/HI and can be discharged home with close followup with her outpatient psychiatrist. It is ok for her to restart Klonopin at bedtime in short term until she sees her psychiatrist.     Rec:   -can take Klonopin 1-2 mg qHS until followup with psychiatrist in three days  -stop Vyvanse for now    Time with patient: 20 minutes    More than 50% of the time was spent counseling/coordinating care    Laboratory Data:   Labs Reviewed   CBC W/ AUTO DIFFERENTIAL - Abnormal; Notable for the following components:       Result Value    RBC 3.81 (*)     Hemoglobin 10.9 (*)     Hematocrit 32.1 (*)     MPV 6.5 (*)     All other components within normal limits   COMPREHENSIVE METABOLIC PANEL - Abnormal; Notable for the following components:    CO2 21 (*)     Glucose 114 (*)     Total Bilirubin 1.4 (*)     Alkaline Phosphatase 145 (*)      (*)      (*)     All other components within normal limits   URINALYSIS, REFLEX TO URINE CULTURE - Abnormal; Notable for the following components:    Specific Gravity, UA <=1.005 (*)     All other components within normal limits    Narrative:     Preferred Collection Type->Urine, Clean Catch   ACETAMINOPHEN LEVEL - Abnormal; Notable for the following components:    Acetaminophen (Tylenol), Serum <3.0 (*)     All other components within normal limits   TSH   DRUG SCREEN PANEL, URINE EMERGENCY    Narrative:     Preferred Collection Type->Urine, Clean Catch    ALCOHOL,MEDICAL (ETHANOL)         Consulting clinician was informed of the encounter and consult note.    Consultation ended: 8/24/2018 at 10:05 pm

## 2018-08-29 ENCOUNTER — PATIENT MESSAGE (OUTPATIENT)
Dept: INTERNAL MEDICINE | Facility: CLINIC | Age: 43
End: 2018-08-29

## 2018-08-31 RX ORDER — IBUPROFEN 800 MG/1
TABLET ORAL
Qty: 90 TABLET | Refills: 0 | Status: SHIPPED | OUTPATIENT
Start: 2018-08-31 | End: 2018-10-10 | Stop reason: SDUPTHER

## 2018-09-18 RX ORDER — TIZANIDINE 4 MG/1
TABLET ORAL
Qty: 90 TABLET | Refills: 0 | Status: SHIPPED | OUTPATIENT
Start: 2018-09-18 | End: 2018-10-22 | Stop reason: SDUPTHER

## 2018-09-18 RX ORDER — PROMETHAZINE HYDROCHLORIDE 25 MG/1
TABLET ORAL
Qty: 30 TABLET | Refills: 0 | Status: SHIPPED | OUTPATIENT
Start: 2018-09-18 | End: 2018-11-16 | Stop reason: SDUPTHER

## 2018-09-21 DIAGNOSIS — M54.42 LOW BACK PAIN WITH LEFT-SIDED SCIATICA, UNSPECIFIED BACK PAIN LATERALITY, UNSPECIFIED CHRONICITY: ICD-10-CM

## 2018-09-21 DIAGNOSIS — M54.2 CERVICALGIA: ICD-10-CM

## 2018-09-21 RX ORDER — OXYCODONE AND ACETAMINOPHEN 10; 325 MG/1; MG/1
1 TABLET ORAL EVERY 6 HOURS PRN
Qty: 120 TABLET | Refills: 0 | Status: SHIPPED | OUTPATIENT
Start: 2018-09-21 | End: 2018-09-24 | Stop reason: SDUPTHER

## 2018-09-24 ENCOUNTER — TELEPHONE (OUTPATIENT)
Dept: INTERNAL MEDICINE | Facility: CLINIC | Age: 43
End: 2018-09-24

## 2018-09-24 DIAGNOSIS — M54.42 LOW BACK PAIN WITH LEFT-SIDED SCIATICA, UNSPECIFIED BACK PAIN LATERALITY, UNSPECIFIED CHRONICITY: ICD-10-CM

## 2018-09-24 DIAGNOSIS — M54.2 CERVICALGIA: ICD-10-CM

## 2018-09-24 RX ORDER — OXYCODONE AND ACETAMINOPHEN 10; 325 MG/1; MG/1
1 TABLET ORAL EVERY 6 HOURS PRN
Qty: 120 TABLET | Refills: 0 | Status: SHIPPED | OUTPATIENT
Start: 2018-09-24 | End: 2018-10-22 | Stop reason: SDUPTHER

## 2018-09-24 NOTE — TELEPHONE ENCOUNTER
----- Message from Helga Cat sent at 9/24/2018  1:09 PM CDT -----  Contact: Cayetano Monteiro calling to inform office that oxyCODONE-acetaminophen (PERCOCET)  mg per tablet cannot be called in and has to be paper printed. Please advise

## 2018-09-25 ENCOUNTER — PATIENT MESSAGE (OUTPATIENT)
Dept: INTERNAL MEDICINE | Facility: CLINIC | Age: 43
End: 2018-09-25

## 2018-09-30 RX ORDER — ALBUTEROL SULFATE 90 UG/1
AEROSOL, METERED RESPIRATORY (INHALATION)
Qty: 18 G | Refills: 0 | Status: SHIPPED | OUTPATIENT
Start: 2018-09-30 | End: 2019-01-10 | Stop reason: SDUPTHER

## 2018-10-02 ENCOUNTER — PATIENT MESSAGE (OUTPATIENT)
Dept: INTERNAL MEDICINE | Facility: CLINIC | Age: 43
End: 2018-10-02

## 2018-10-10 RX ORDER — ZOLPIDEM TARTRATE 10 MG/1
TABLET ORAL
Qty: 30 TABLET | Refills: 0 | Status: SHIPPED | OUTPATIENT
Start: 2018-10-10 | End: 2018-11-09 | Stop reason: SDUPTHER

## 2018-10-10 RX ORDER — IBUPROFEN 800 MG/1
TABLET ORAL
Qty: 90 TABLET | Refills: 0 | Status: SHIPPED | OUTPATIENT
Start: 2018-10-10 | End: 2018-12-14 | Stop reason: SDUPTHER

## 2018-10-21 ENCOUNTER — PATIENT MESSAGE (OUTPATIENT)
Dept: INTERNAL MEDICINE | Facility: CLINIC | Age: 43
End: 2018-10-21

## 2018-10-21 DIAGNOSIS — Z12.39 BREAST CANCER SCREENING: Primary | ICD-10-CM

## 2018-10-22 DIAGNOSIS — M54.2 CERVICALGIA: ICD-10-CM

## 2018-10-22 DIAGNOSIS — M54.42 LOW BACK PAIN WITH LEFT-SIDED SCIATICA, UNSPECIFIED BACK PAIN LATERALITY, UNSPECIFIED CHRONICITY: ICD-10-CM

## 2018-10-22 RX ORDER — TIZANIDINE 4 MG/1
TABLET ORAL
Qty: 90 TABLET | Refills: 0 | Status: SHIPPED | OUTPATIENT
Start: 2018-10-22 | End: 2019-02-20

## 2018-10-22 RX ORDER — OXYCODONE AND ACETAMINOPHEN 10; 325 MG/1; MG/1
1 TABLET ORAL EVERY 6 HOURS PRN
Qty: 120 TABLET | Refills: 0 | Status: SHIPPED | OUTPATIENT
Start: 2018-10-22 | End: 2018-11-19 | Stop reason: SDUPTHER

## 2018-10-22 RX ORDER — TIZANIDINE 4 MG/1
TABLET ORAL
Qty: 90 TABLET | Refills: 0 | Status: SHIPPED | OUTPATIENT
Start: 2018-10-22 | End: 2018-12-16 | Stop reason: SDUPTHER

## 2018-10-24 ENCOUNTER — TELEPHONE (OUTPATIENT)
Dept: INTERNAL MEDICINE | Facility: CLINIC | Age: 43
End: 2018-10-24

## 2018-10-24 NOTE — TELEPHONE ENCOUNTER
email states :  Hey,   I know Dr Lee doesn't know who to recommend for psych. My current psychiatrist is no longer treating adult ADHD and I'm in the process of finding another Medicaid psychiatrist which I may say I close to impossible but I won't give up. The reason Olga scheduled me an urgent care visit I had to miss my. Mom broke her arm and had to have surgery was because I need somebody to prescribe my Adderal 20mg bid (I ended up with a reaction to the Vyvanse but have taken Adderal before and that's what she recommended I go. back on she just can't prescribe it until I can find another Psychiatrist who can treat me for it. So if she can do that I don't need to see her I'm searching as we speak for a new psychiatrist it's just harder than I anticipated. Please let me know something either way.     Thank you in advance for all of your help and support in taking care of all of my many needs!           Can you rx the adderal 20mg bid for her?    Please advise.  Thanks judy

## 2018-10-25 ENCOUNTER — HOSPITAL ENCOUNTER (OUTPATIENT)
Dept: RADIOLOGY | Facility: HOSPITAL | Age: 43
Discharge: HOME | End: 2018-10-25
Attending: INTERNAL MEDICINE
Payer: MEDICAID

## 2018-10-25 DIAGNOSIS — Z12.39 BREAST CANCER SCREENING: ICD-10-CM

## 2018-10-25 PROCEDURE — 77063 BREAST TOMOSYNTHESIS BI: CPT | Mod: 26,,, | Performed by: RADIOLOGY

## 2018-10-25 PROCEDURE — 77063 BREAST TOMOSYNTHESIS BI: CPT | Mod: TC,PO

## 2018-10-25 PROCEDURE — 77067 SCR MAMMO BI INCL CAD: CPT | Mod: 26,,, | Performed by: RADIOLOGY

## 2018-11-05 ENCOUNTER — PATIENT MESSAGE (OUTPATIENT)
Dept: INTERNAL MEDICINE | Facility: CLINIC | Age: 43
End: 2018-11-05

## 2018-11-09 RX ORDER — ZOLPIDEM TARTRATE 10 MG/1
TABLET ORAL
Qty: 30 TABLET | Refills: 0 | Status: SHIPPED | OUTPATIENT
Start: 2018-11-09 | End: 2018-12-10 | Stop reason: SDUPTHER

## 2018-11-16 RX ORDER — PROMETHAZINE HYDROCHLORIDE 25 MG/1
TABLET ORAL
Qty: 30 TABLET | Refills: 0 | Status: SHIPPED | OUTPATIENT
Start: 2018-11-16 | End: 2019-01-10 | Stop reason: SDUPTHER

## 2018-11-19 DIAGNOSIS — M54.42 LOW BACK PAIN WITH LEFT-SIDED SCIATICA, UNSPECIFIED BACK PAIN LATERALITY, UNSPECIFIED CHRONICITY: ICD-10-CM

## 2018-11-19 DIAGNOSIS — M54.2 CERVICALGIA: ICD-10-CM

## 2018-11-20 RX ORDER — OXYCODONE AND ACETAMINOPHEN 10; 325 MG/1; MG/1
1 TABLET ORAL EVERY 6 HOURS PRN
Qty: 120 TABLET | Refills: 0 | Status: SHIPPED | OUTPATIENT
Start: 2018-11-20 | End: 2018-12-18 | Stop reason: SDUPTHER

## 2018-11-20 RX ORDER — BUTALBITAL, ACETAMINOPHEN AND CAFFEINE 50; 325; 40 MG/1; MG/1; MG/1
TABLET ORAL
Qty: 90 TABLET | Refills: 0 | Status: SHIPPED | OUTPATIENT
Start: 2018-11-20 | End: 2019-02-20

## 2018-11-23 RX ORDER — BUTALBITAL, ACETAMINOPHEN AND CAFFEINE 50; 325; 40 MG/1; MG/1; MG/1
1 TABLET ORAL EVERY 6 HOURS PRN
Qty: 90 TABLET | Refills: 0 | OUTPATIENT
Start: 2018-11-23

## 2018-11-26 ENCOUNTER — TELEPHONE (OUTPATIENT)
Dept: INTERNAL MEDICINE | Facility: CLINIC | Age: 43
End: 2018-11-26

## 2018-11-26 NOTE — TELEPHONE ENCOUNTER
----- Message from Timo Salinas sent at 11/23/2018  1:39 PM CST -----  Contact: Patient [phone]  Print Out's      Patient calling would like to know if office can have both Rx butalbital-acetaminophen-caffeine -40 mg (FIORICET, ESGIC) -40 mg per tablet and butalbital-acetaminophen-caffeine -40 mg (FIORICET, ESGIC) -40 mg per tablet    Print out ready for , stating will be to office to  around 3:00pm, would like a call to inform if this will or will not work for the office.    Please call an advise  Thank you

## 2018-12-10 RX ORDER — ZOLPIDEM TARTRATE 10 MG/1
10 TABLET ORAL NIGHTLY PRN
Qty: 30 TABLET | Refills: 0 | Status: SHIPPED | OUTPATIENT
Start: 2018-12-10 | End: 2019-01-08 | Stop reason: SDUPTHER

## 2018-12-10 RX ORDER — ZOLPIDEM TARTRATE 10 MG/1
TABLET ORAL
Qty: 30 TABLET | Refills: 0 | OUTPATIENT
Start: 2018-12-10

## 2018-12-17 DIAGNOSIS — M54.42 LOW BACK PAIN WITH LEFT-SIDED SCIATICA, UNSPECIFIED BACK PAIN LATERALITY, UNSPECIFIED CHRONICITY: ICD-10-CM

## 2018-12-17 DIAGNOSIS — M54.2 CERVICALGIA: ICD-10-CM

## 2018-12-17 DIAGNOSIS — R51.9 NONINTRACTABLE HEADACHE, UNSPECIFIED CHRONICITY PATTERN, UNSPECIFIED HEADACHE TYPE: Primary | ICD-10-CM

## 2018-12-17 RX ORDER — TIZANIDINE 4 MG/1
TABLET ORAL
Qty: 90 TABLET | Refills: 0 | Status: SHIPPED | OUTPATIENT
Start: 2018-12-17 | End: 2019-01-10 | Stop reason: SDUPTHER

## 2018-12-18 RX ORDER — OXYCODONE AND ACETAMINOPHEN 10; 325 MG/1; MG/1
1 TABLET ORAL EVERY 6 HOURS PRN
Qty: 120 TABLET | Refills: 0 | Status: SHIPPED | OUTPATIENT
Start: 2018-12-18 | End: 2019-01-17 | Stop reason: SDUPTHER

## 2018-12-18 RX ORDER — BUTALBITAL, ACETAMINOPHEN AND CAFFEINE 50; 325; 40 MG/1; MG/1; MG/1
TABLET ORAL
Qty: 90 TABLET | Refills: 0 | Status: SHIPPED | OUTPATIENT
Start: 2018-12-18 | End: 2019-02-20

## 2018-12-18 RX ORDER — IBUPROFEN 800 MG/1
TABLET ORAL
Qty: 90 TABLET | Refills: 0 | Status: SHIPPED | OUTPATIENT
Start: 2018-12-18 | End: 2019-01-19 | Stop reason: SDUPTHER

## 2018-12-18 RX ORDER — OXYCODONE AND ACETAMINOPHEN 10; 325 MG/1; MG/1
1 TABLET ORAL EVERY 6 HOURS PRN
Qty: 120 TABLET | Refills: 0 | OUTPATIENT
Start: 2018-12-18

## 2018-12-18 NOTE — TELEPHONE ENCOUNTER
Please call in prescription for Fioricet per medication list.  Prescription for pain medication is ready for pickup.

## 2018-12-19 ENCOUNTER — PATIENT MESSAGE (OUTPATIENT)
Dept: INTERNAL MEDICINE | Facility: CLINIC | Age: 43
End: 2018-12-19

## 2018-12-21 ENCOUNTER — OFFICE VISIT (OUTPATIENT)
Dept: URGENT CARE | Facility: CLINIC | Age: 43
End: 2018-12-21
Payer: MEDICAID

## 2018-12-21 VITALS
WEIGHT: 206 LBS | OXYGEN SATURATION: 100 % | RESPIRATION RATE: 18 BRPM | DIASTOLIC BLOOD PRESSURE: 85 MMHG | TEMPERATURE: 97 F | HEIGHT: 61 IN | SYSTOLIC BLOOD PRESSURE: 122 MMHG | BODY MASS INDEX: 38.89 KG/M2 | HEART RATE: 76 BPM

## 2018-12-21 DIAGNOSIS — K02.9 DENTAL CARIES: Primary | ICD-10-CM

## 2018-12-21 DIAGNOSIS — K08.89 PAIN, DENTAL: ICD-10-CM

## 2018-12-21 PROCEDURE — 99214 OFFICE O/P EST MOD 30 MIN: CPT | Mod: 25,S$GLB,, | Performed by: NURSE PRACTITIONER

## 2018-12-21 RX ORDER — CLINDAMYCIN HYDROCHLORIDE 300 MG/1
300 CAPSULE ORAL 3 TIMES DAILY
Qty: 30 CAPSULE | Refills: 0 | Status: SHIPPED | OUTPATIENT
Start: 2018-12-21 | End: 2018-12-31

## 2018-12-21 RX ORDER — CHLORHEXIDINE GLUCONATE ORAL RINSE 1.2 MG/ML
15 SOLUTION DENTAL 2 TIMES DAILY
Qty: 473 ML | Refills: 0 | Status: SHIPPED | OUTPATIENT
Start: 2018-12-21 | End: 2019-01-04

## 2018-12-21 RX ORDER — DEXAMETHASONE SODIUM PHOSPHATE 4 MG/ML
8 INJECTION, SOLUTION INTRA-ARTICULAR; INTRALESIONAL; INTRAMUSCULAR; INTRAVENOUS; SOFT TISSUE
Status: COMPLETED | OUTPATIENT
Start: 2018-12-21 | End: 2018-12-21

## 2018-12-21 RX ADMIN — DEXAMETHASONE SODIUM PHOSPHATE 8 MG: 4 INJECTION, SOLUTION INTRA-ARTICULAR; INTRALESIONAL; INTRAMUSCULAR; INTRAVENOUS; SOFT TISSUE at 08:12

## 2018-12-22 NOTE — PROGRESS NOTES
"Subjective:       Patient ID: Esther Gage is a 43 y.o. female.    Vitals:  height is 5' 1" (1.549 m) and weight is 93.4 kg (206 lb). Her oral temperature is 97 °F (36.1 °C). Her blood pressure is 122/85 and her pulse is 76. Her respiration is 18 and oxygen saturation is 100%.     Chief Complaint: Facial Pain    Pt presents with left facial pain x 2 days. Pt has very poor dentition. Pt describes pain as throbbing quality, constant timing, 6/10 intensity. She had an old rx of clindamycin and took 2 pills and states swelling and pain is improving. She denies f/c/n/v.       Facial Pain   This is a new problem. The current episode started in the past 7 days. Pertinent negatives include no arthralgias, chest pain, chills, congestion, coughing, fatigue, fever, headaches, joint swelling, myalgias, nausea, rash, sore throat, vertigo, vomiting or weakness. Treatments tried: left over clindamycin, ibuprofen.       Constitution: Negative for chills, fatigue and fever.   HENT: Positive for dental problem. Negative for congestion and sore throat.    Neck: Negative for painful lymph nodes.   Cardiovascular: Negative for chest pain and leg swelling.   Eyes: Negative for double vision and blurred vision.   Respiratory: Negative for cough and shortness of breath.    Gastrointestinal: Negative for nausea, vomiting and diarrhea.   Genitourinary: Negative for dysuria, frequency, urgency and history of kidney stones.   Musculoskeletal: Negative for joint pain, joint swelling, muscle cramps and muscle ache.   Skin: Negative for color change, pale, rash and bruising.   Allergic/Immunologic: Negative for seasonal allergies.   Neurological: Negative for dizziness, history of vertigo, light-headedness, passing out and headaches.   Hematologic/Lymphatic: Negative for swollen lymph nodes.   Psychiatric/Behavioral: Negative for nervous/anxious, sleep disturbance and depression. The patient is not nervous/anxious.        Objective:    "   Physical Exam   Constitutional: She is oriented to person, place, and time. She appears well-developed and well-nourished. She is cooperative.  Non-toxic appearance. She does not appear ill. No distress.   HENT:   Head: Normocephalic and atraumatic.   Right Ear: Hearing, tympanic membrane, external ear and ear canal normal.   Left Ear: Hearing, tympanic membrane, external ear and ear canal normal.   Nose: Nose normal. No mucosal edema, rhinorrhea or nasal deformity. No epistaxis. Right sinus exhibits no maxillary sinus tenderness and no frontal sinus tenderness. Left sinus exhibits no maxillary sinus tenderness and no frontal sinus tenderness.   Mouth/Throat: Uvula is midline, oropharynx is clear and moist and mucous membranes are normal. No trismus in the jaw. Normal dentition. No uvula swelling. No posterior oropharyngeal erythema.   Very poor dentition with cracked teeth and dental caries, erythema to left upper gums, no drainage or visible abscess   Eyes: Conjunctivae and lids are normal. Right eye exhibits no discharge. Left eye exhibits no discharge. No scleral icterus.   Sclera clear bilat   Neck: Trachea normal, normal range of motion, full passive range of motion without pain and phonation normal. Neck supple.   Cardiovascular: Normal rate, regular rhythm, normal heart sounds, intact distal pulses and normal pulses.   Pulmonary/Chest: Effort normal and breath sounds normal. No respiratory distress.   Abdominal: Soft. Normal appearance and bowel sounds are normal. She exhibits no distension, no pulsatile midline mass and no mass. There is no tenderness.   Musculoskeletal: Normal range of motion. She exhibits no edema or deformity.   Neurological: She is alert and oriented to person, place, and time. She exhibits normal muscle tone. Coordination normal.   Skin: Skin is warm, dry and intact. She is not diaphoretic. No pallor.   Psychiatric: She has a normal mood and affect. Her speech is normal and behavior  is normal. Judgment and thought content normal. Cognition and memory are normal.   Nursing note and vitals reviewed.      Assessment:       1. Dental caries    2. Pain, dental        Plan:         Dental caries    Pain, dental    Other orders  -     clindamycin (CLEOCIN) 300 MG capsule; Take 1 capsule (300 mg total) by mouth 3 (three) times daily. for 10 days  Dispense: 30 capsule; Refill: 0  -     chlorhexidine (PERIDEX) 0.12 % solution; Use as directed 15 mLs in the mouth or throat 2 (two) times daily. for 14 days  Dispense: 473 mL; Refill: 0  -     dexamethasone injection 8 mg    pt advised to f/u with [facility]

## 2018-12-22 NOTE — PATIENT INSTRUCTIONS
Understanding Tooth Decay  Plaque is a sticky coating of bacteria and other substances that forms on your teeth and gums. It can cause 2 serious problems: tooth decay and gum disease. These problems damage the teeth and gums, and may even lead to tooth loss. When the mouth is well cared for, tooth decay and gum disease can be reversed in their early stages. Better yet, you can prevent these problems from starting by brushing and flossing daily and avoiding between meal snacking on foods high in sugar and starch.     Once tooth decay eats through the enamel, it spreads quickly through the softer dentin.       After tooth decay is removed, the hole is filled with a hard material.      How tooth decay develops  Tooth decay happens when bacteria in plaque make acids that eat away at the tooth. Cavities (also called caries) are holes that form in the teeth. They are most common in places that are hard to reach with a toothbrush. This includes the grooves at the tops of the back teeth, and on the sides where the teeth touch. In late stages, tooth decay can be painful. It can also lead to tooth loss.  Treating tooth decay  Tooth decay can be treated to keep it from moving farther into the tooth. This is often done by filling cavities. First, any tooth decay is removed. This protects the tooth from more damage. Then, the cavity is filled with a hard material. This filling protects the damaged tooth and restores the tooth's surface. If the tooth is severely damaged by decay, other treatments are available.  Follow-up visits  Visit your dental team at least every 6 months for a checkup and cleaning. If youre being treated for tooth decay or gum disease, you may need more frequent visits. These visits will likely decrease as your mouth care efforts start to pay off. Keep flossing and brushing, and maintain a healthy diet. Follow any special instructions your dentist or dental hygienist gives you. And enjoy flashing your  "healthy smile!  Date Last Reviewed: 6/30/2015  © 5121-3050 [employer]. [address] [place] [address], [place] [address]. All rights reserved. This information is not intended as a substitute for professional medical care. Always follow your healthcare professional's instructions.        Dental Pain    A crack or cavity in a tooth can cause tooth pain. This is because the crack or cavity exposes the sensitive inner area of the tooth. An infection in the gum or the root of the tooth can cause pain and swelling. The pain is often made worse when you drink hot or cold beverages. It can also be worse when you bite on hard foods. Pain may spread from the tooth to your ear or the area of the jaw on the same side.  Home care  Follow these tips when caring for yourself at home:  · Avoid hot and cold foods and drinks. Your tooth may be sensitive to changes in temperature.  · Use toothpaste made for sensitive teeth. Brush gently up and down instead of sideways. Brushing sideways can wear away root surfaces if they are exposed.  · If your tooth is chipped or cracked, or if there is a large open cavity, put oil of cloves directly on the tooth to relieve pain. You can buy oil of cloves at drugstores. Some pharmacies carry an over-the-counter "toothache kit." This contains a paste that you can put on the exposed tooth to make it less sensitive.  · Put a cold pack on your jaw over the sore area to help reduce pain.  · You may use over-the-counter medicine to ease pain, unless your doctor prescribed another medicine. If you have chronic liver or kidney disease, talk with your healthcare provider before using acetaminophen or ibuprofen. Also talk with your provider if youve had a stomach ulcer or GI bleeding.  · If you have signs of an infection, you will be given an antibiotic. Take it as directed.  Follow-up care  Follow up with your dentist, or as advised. Your pain may go away with the treatment given today. But only a " dentist can fully look at and treat the cause of your pain. This will keep the pain from coming back.  Call 911  Call 911 if any of these occur:  · Unusual drowsiness  · Headache or stiff neck  · Weakness or fainting  · Difficulty swallowing or breathing  When to seek medical advice  Call your health care provider right away if any of these occur:  · Your face becomes swollen or red  · Pain gets worse or spreads to your neck  · Fever of 100.4º F (38.0º C) or higher, or as directed by your healthcare provider  · Pus drains from the tooth  Date Last Reviewed: 10/1/2016  © 9814-8708 [employer]. [address] [place] [address], [place] [address]. All rights reserved. This information is not intended as a substitute for professional medical care. Always follow your healthcare professional's instructions.

## 2018-12-28 ENCOUNTER — PATIENT MESSAGE (OUTPATIENT)
Dept: INTERNAL MEDICINE | Facility: CLINIC | Age: 43
End: 2018-12-28

## 2018-12-28 RX ORDER — LAMOTRIGINE 200 MG/1
200 TABLET ORAL 2 TIMES DAILY
Qty: 180 TABLET | Refills: 3 | Status: SHIPPED | OUTPATIENT
Start: 2018-12-28 | End: 2019-08-07 | Stop reason: SDUPTHER

## 2018-12-28 RX ORDER — LURASIDONE HYDROCHLORIDE 80 MG/1
80 TABLET, FILM COATED ORAL DAILY
Qty: 30 TABLET | Refills: 0 | Status: SHIPPED | OUTPATIENT
Start: 2018-12-28 | End: 2019-02-07 | Stop reason: SDUPTHER

## 2018-12-28 RX ORDER — GABAPENTIN 600 MG/1
TABLET ORAL
COMMUNITY
Start: 2018-12-11 | End: 2019-02-20

## 2018-12-28 RX ORDER — CLONIDINE HYDROCHLORIDE 0.2 MG/1
TABLET ORAL
COMMUNITY
Start: 2018-12-11 | End: 2018-12-28 | Stop reason: SDUPTHER

## 2018-12-28 RX ORDER — CLONAZEPAM 2 MG/1
2 TABLET ORAL DAILY
Qty: 30 TABLET | Refills: 0 | Status: SHIPPED | OUTPATIENT
Start: 2018-12-28 | End: 2018-12-28 | Stop reason: SDUPTHER

## 2018-12-28 RX ORDER — CLONIDINE HYDROCHLORIDE 0.2 MG/1
0.2 TABLET ORAL DAILY
Qty: 30 TABLET | Refills: 0 | Status: SHIPPED | OUTPATIENT
Start: 2018-12-28 | End: 2019-02-07 | Stop reason: SDUPTHER

## 2018-12-28 RX ORDER — GABAPENTIN 600 MG/1
600 TABLET ORAL 3 TIMES DAILY
Qty: 90 TABLET | Refills: 0 | Status: SHIPPED | OUTPATIENT
Start: 2018-12-28 | End: 2019-02-07 | Stop reason: SDUPTHER

## 2018-12-28 RX ORDER — CLONAZEPAM 1 MG/1
1 TABLET ORAL 3 TIMES DAILY
Qty: 90 TABLET | Refills: 3 | Status: SHIPPED | OUTPATIENT
Start: 2018-12-28 | End: 2018-12-28 | Stop reason: SDUPTHER

## 2018-12-28 RX ORDER — ESTRADIOL 2 MG/1
TABLET ORAL
Qty: 90 TABLET | Refills: 0 | Status: SHIPPED | OUTPATIENT
Start: 2018-12-28 | End: 2019-01-10 | Stop reason: SDUPTHER

## 2018-12-28 RX ORDER — CLONAZEPAM 2 MG/1
TABLET ORAL
COMMUNITY
Start: 2018-11-25 | End: 2018-12-28 | Stop reason: SDUPTHER

## 2018-12-28 RX ORDER — FLUOXETINE HYDROCHLORIDE 20 MG/1
20 CAPSULE ORAL DAILY
Qty: 30 CAPSULE | Refills: 0 | Status: SHIPPED | OUTPATIENT
Start: 2018-12-28 | End: 2019-01-10 | Stop reason: SDUPTHER

## 2018-12-28 NOTE — TELEPHONE ENCOUNTER
Prozac 20mg qd #30   Lamictal 200mg bid #60   Clonazepam 1mg bid #60   Clonazepam 2mg qhs #30   I still have my Latuda and Gabapentin and Clonidine until the 6th but if you want to send them in advance or I can try and make it a week without them it's   Latuda 80mg qd #30   Clonidine 0.2mg qd #30   Gabapentin 600mg tid #90

## 2018-12-29 RX ORDER — CLONAZEPAM 2 MG/1
TABLET ORAL
Qty: 60 TABLET | Refills: 0 | Status: SHIPPED | OUTPATIENT
Start: 2018-12-29 | End: 2019-04-18 | Stop reason: SDUPTHER

## 2018-12-29 RX ORDER — CLONAZEPAM 1 MG/1
TABLET ORAL
Qty: 90 TABLET | Refills: 0 | Status: SHIPPED | OUTPATIENT
Start: 2018-12-29 | End: 2019-04-18 | Stop reason: SDUPTHER

## 2019-01-08 RX ORDER — ZOLPIDEM TARTRATE 10 MG/1
TABLET ORAL
Qty: 30 TABLET | Refills: 0 | Status: SHIPPED | OUTPATIENT
Start: 2019-01-08 | End: 2019-02-06 | Stop reason: SDUPTHER

## 2019-01-08 RX ORDER — OXYBUTYNIN CHLORIDE 5 MG/1
TABLET ORAL
Qty: 270 TABLET | Refills: 0 | Status: SHIPPED | OUTPATIENT
Start: 2019-01-08 | End: 2019-04-13 | Stop reason: SDUPTHER

## 2019-01-10 RX ORDER — TIZANIDINE 4 MG/1
TABLET ORAL
Qty: 90 TABLET | Refills: 0 | Status: SHIPPED | OUTPATIENT
Start: 2019-01-10 | End: 2019-01-30 | Stop reason: SDUPTHER

## 2019-01-10 RX ORDER — ALBUTEROL SULFATE 90 UG/1
AEROSOL, METERED RESPIRATORY (INHALATION)
Qty: 18 G | Refills: 0 | Status: SHIPPED | OUTPATIENT
Start: 2019-01-10 | End: 2020-01-09

## 2019-01-10 RX ORDER — FLUOXETINE HYDROCHLORIDE 20 MG/1
CAPSULE ORAL
Qty: 30 CAPSULE | Refills: 0 | Status: SHIPPED | OUTPATIENT
Start: 2019-01-10 | End: 2019-02-20

## 2019-01-10 RX ORDER — OMEPRAZOLE 40 MG/1
CAPSULE, DELAYED RELEASE ORAL
Qty: 90 CAPSULE | Refills: 0 | Status: SHIPPED | OUTPATIENT
Start: 2019-01-10 | End: 2019-03-06 | Stop reason: SDUPTHER

## 2019-01-10 RX ORDER — HYDROXYZINE HYDROCHLORIDE 25 MG/1
TABLET, FILM COATED ORAL
Qty: 60 TABLET | Refills: 0 | Status: SHIPPED | OUTPATIENT
Start: 2019-01-10 | End: 2020-01-09

## 2019-01-10 RX ORDER — PROMETHAZINE HYDROCHLORIDE 25 MG/1
TABLET ORAL
Qty: 30 TABLET | Refills: 0 | Status: SHIPPED | OUTPATIENT
Start: 2019-01-10 | End: 2019-02-22 | Stop reason: SDUPTHER

## 2019-01-12 RX ORDER — ESTRADIOL 2 MG/1
2 TABLET ORAL DAILY
Qty: 90 TABLET | Refills: 0 | Status: SHIPPED | OUTPATIENT
Start: 2019-01-12 | End: 2019-02-20

## 2019-01-12 RX ORDER — FLUOXETINE HYDROCHLORIDE 20 MG/1
20 CAPSULE ORAL DAILY
Qty: 30 CAPSULE | Refills: 0 | Status: SHIPPED | OUTPATIENT
Start: 2019-01-12 | End: 2019-02-23 | Stop reason: SDUPTHER

## 2019-01-12 RX ORDER — FUROSEMIDE 20 MG/1
20 TABLET ORAL DAILY PRN
Qty: 30 TABLET | Refills: 3 | Status: SHIPPED | OUTPATIENT
Start: 2019-01-12 | End: 2020-06-01

## 2019-01-12 RX ORDER — BUTALBITAL, ACETAMINOPHEN AND CAFFEINE 50; 325; 40 MG/1; MG/1; MG/1
TABLET ORAL
Qty: 90 TABLET | Refills: 0 | Status: SHIPPED | OUTPATIENT
Start: 2019-01-12 | End: 2019-03-05 | Stop reason: SDUPTHER

## 2019-01-12 RX ORDER — OMEPRAZOLE 40 MG/1
40 CAPSULE, DELAYED RELEASE ORAL
Qty: 180 CAPSULE | Refills: 3 | Status: SHIPPED | OUTPATIENT
Start: 2019-01-12 | End: 2019-02-20

## 2019-01-14 ENCOUNTER — PATIENT MESSAGE (OUTPATIENT)
Dept: INTERNAL MEDICINE | Facility: CLINIC | Age: 44
End: 2019-01-14

## 2019-01-14 RX ORDER — ESTRADIOL 2 MG/1
TABLET ORAL
Qty: 90 TABLET | Refills: 0 | Status: SHIPPED | OUTPATIENT
Start: 2019-01-14

## 2019-01-17 ENCOUNTER — PATIENT MESSAGE (OUTPATIENT)
Dept: INTERNAL MEDICINE | Facility: CLINIC | Age: 44
End: 2019-01-17

## 2019-01-17 DIAGNOSIS — M54.42 LOW BACK PAIN WITH LEFT-SIDED SCIATICA, UNSPECIFIED BACK PAIN LATERALITY, UNSPECIFIED CHRONICITY: ICD-10-CM

## 2019-01-17 DIAGNOSIS — M54.2 CERVICALGIA: ICD-10-CM

## 2019-01-17 RX ORDER — OXYCODONE AND ACETAMINOPHEN 10; 325 MG/1; MG/1
1 TABLET ORAL EVERY 6 HOURS PRN
Qty: 120 TABLET | Refills: 0 | Status: SHIPPED | OUTPATIENT
Start: 2019-01-17 | End: 2019-02-20 | Stop reason: SDUPTHER

## 2019-01-21 RX ORDER — IBUPROFEN 800 MG/1
TABLET ORAL
Qty: 90 TABLET | Refills: 0 | Status: SHIPPED | OUTPATIENT
Start: 2019-01-21 | End: 2019-05-08 | Stop reason: SDUPTHER

## 2019-01-25 ENCOUNTER — TELEPHONE (OUTPATIENT)
Dept: INTERNAL MEDICINE | Facility: CLINIC | Age: 44
End: 2019-01-25

## 2019-01-25 NOTE — TELEPHONE ENCOUNTER
----- Message from Maria Luz Fabian sent at 1/25/2019  8:49 AM CST -----  Contact: Amanda ([facility]) [phone]  Pharmacy is calling to clarify an RX.  RX name:  clonazePAM (KLONOPIN) 1 MG tablet    clonazePAM (KLONOPIN) 2 MG Tab      What do they need to clarify:  Which MG the patient should be taking   Comments: Should she be taking both they receive a script for both

## 2019-01-28 RX ORDER — BUTALBITAL, ACETAMINOPHEN AND CAFFEINE 50; 325; 40 MG/1; MG/1; MG/1
TABLET ORAL
Qty: 90 TABLET | Refills: 0 | Status: SHIPPED | OUTPATIENT
Start: 2019-01-28 | End: 2019-02-20

## 2019-01-29 RX ORDER — FLUOXETINE HYDROCHLORIDE 20 MG/1
CAPSULE ORAL
Qty: 30 CAPSULE | Refills: 0 | Status: SHIPPED | OUTPATIENT
Start: 2019-01-29 | End: 2019-07-03

## 2019-01-30 RX ORDER — TIZANIDINE 4 MG/1
TABLET ORAL
Qty: 90 TABLET | Refills: 0 | Status: SHIPPED | OUTPATIENT
Start: 2019-01-30 | End: 2019-02-20

## 2019-02-04 RX ORDER — OMEPRAZOLE 40 MG/1
CAPSULE, DELAYED RELEASE ORAL
Qty: 180 CAPSULE | Refills: 0 | Status: SHIPPED | OUTPATIENT
Start: 2019-02-04 | End: 2019-02-20

## 2019-02-07 RX ORDER — ZOLPIDEM TARTRATE 10 MG/1
TABLET ORAL
Qty: 30 TABLET | Refills: 0 | Status: SHIPPED | OUTPATIENT
Start: 2019-02-07 | End: 2019-05-08 | Stop reason: SDUPTHER

## 2019-02-07 RX ORDER — LURASIDONE HYDROCHLORIDE 80 MG/1
TABLET, FILM COATED ORAL
Qty: 30 TABLET | Refills: 0 | Status: SHIPPED | OUTPATIENT
Start: 2019-02-07 | End: 2019-03-07 | Stop reason: SDUPTHER

## 2019-02-07 RX ORDER — CLONIDINE HYDROCHLORIDE 0.2 MG/1
TABLET ORAL
Qty: 30 TABLET | Refills: 0 | Status: SHIPPED | OUTPATIENT
Start: 2019-02-07 | End: 2019-03-07 | Stop reason: SDUPTHER

## 2019-02-07 RX ORDER — GABAPENTIN 600 MG/1
TABLET ORAL
Qty: 90 TABLET | Refills: 0 | Status: SHIPPED | OUTPATIENT
Start: 2019-02-07 | End: 2019-03-06 | Stop reason: SDUPTHER

## 2019-02-20 ENCOUNTER — OFFICE VISIT (OUTPATIENT)
Dept: INTERNAL MEDICINE | Facility: CLINIC | Age: 44
End: 2019-02-20
Payer: MEDICAID

## 2019-02-20 VITALS
SYSTOLIC BLOOD PRESSURE: 102 MMHG | HEART RATE: 72 BPM | TEMPERATURE: 99 F | RESPIRATION RATE: 18 BRPM | DIASTOLIC BLOOD PRESSURE: 78 MMHG | WEIGHT: 210.56 LBS | OXYGEN SATURATION: 99 % | HEIGHT: 60 IN | BODY MASS INDEX: 41.34 KG/M2

## 2019-02-20 DIAGNOSIS — M54.42 LOW BACK PAIN WITH LEFT-SIDED SCIATICA, UNSPECIFIED BACK PAIN LATERALITY, UNSPECIFIED CHRONICITY: ICD-10-CM

## 2019-02-20 DIAGNOSIS — F31.9 BIPOLAR AFFECTIVE DISORDER, REMISSION STATUS UNSPECIFIED: Primary | ICD-10-CM

## 2019-02-20 DIAGNOSIS — M54.2 CERVICALGIA: ICD-10-CM

## 2019-02-20 PROCEDURE — 99213 PR OFFICE/OUTPT VISIT, EST, LEVL III, 20-29 MIN: ICD-10-PCS | Mod: S$PBB,,, | Performed by: INTERNAL MEDICINE

## 2019-02-20 PROCEDURE — 99215 OFFICE O/P EST HI 40 MIN: CPT | Mod: PBBFAC,PO | Performed by: INTERNAL MEDICINE

## 2019-02-20 PROCEDURE — 99999 PR PBB SHADOW E&M-EST. PATIENT-LVL V: ICD-10-PCS | Mod: PBBFAC,,, | Performed by: INTERNAL MEDICINE

## 2019-02-20 PROCEDURE — 99213 OFFICE O/P EST LOW 20 MIN: CPT | Mod: S$PBB,,, | Performed by: INTERNAL MEDICINE

## 2019-02-20 PROCEDURE — 99999 PR PBB SHADOW E&M-EST. PATIENT-LVL V: CPT | Mod: PBBFAC,,, | Performed by: INTERNAL MEDICINE

## 2019-02-20 RX ORDER — OXYCODONE AND ACETAMINOPHEN 10; 325 MG/1; MG/1
1 TABLET ORAL EVERY 6 HOURS PRN
Qty: 120 TABLET | Refills: 0 | Status: SHIPPED | OUTPATIENT
Start: 2019-02-20 | End: 2019-03-20 | Stop reason: SDUPTHER

## 2019-02-20 NOTE — PROGRESS NOTES
CC: followup of hypertension  HPI:  The patient is a 43 y.o. year old female who presents to the office for followup of hypertension.  The patient denies any chest pain, shortness of breath, headache or blurred vision, but complains of excessive fatigue and nausea.  She complains of occasional wheezing.  The patient also reports continued back and neck pain.     PAST MEDICAL HISTORY:  Past Medical History:   Diagnosis Date    ADHD     Allergy     Asthma     childhood    Bipolar disorder     Cirrhosis of liver     Gestational diabetes mellitus 2001    resolved    Hepatitis C     HTN (hypertension)     Hyperlipidemia     Interstitial cystitis     Migraine headache     OCD (obsessive compulsive disorder)        SURGICAL HISTORY:  Past Surgical History:   Procedure Laterality Date    ADENOIDECTOMY      bladder lift      CHOLECYSTECTOMY      HYSTERECTOMY      unilateral oophrectomy    OVARIAN CYST REMOVAL      SINUS SURGERY      TONSILLECTOMY         MEDS:  Medcard reviewed and updated    ALLERGIES: Allergy Card reviewed and updated    SOCIAL HISTORY:   The patient is a nonsmoker.    PE:   APPEARANCE: Well nourished, well developed, in no acute distress.    CHEST: Lungs clear to auscultation with unlabored respirations.  CARDIOVASCULAR: Normal S1, S2. No murmurs. No carotid bruits. No pedal edema.  ABDOMEN: Bowel sounds normal. Not distended. Soft. No tenderness or masses.   PSYCHIATRIC: The patient is oriented to person, place, and time and has a pleasant affect.        ASSESSMENT/PLAN:  Esther was seen today for medication management.    Diagnoses and all orders for this visit:    Bipolar affective disorder, remission status unspecified  -     Ambulatory Referral to Psychiatry    Low back pain with left-sided sciatica, unspecified back pain laterality, unspecified chronicity  -     Ambulatory Referral to Pain Clinic  -     oxyCODONE-acetaminophen (PERCOCET)  mg per tablet; Take 1 tablet by  mouth every 6 (six) hours as needed for Pain.    Cervicalgia  -     oxyCODONE-acetaminophen (PERCOCET)  mg per tablet; Take 1 tablet by mouth every 6 (six) hours as needed for Pain.

## 2019-02-25 RX ORDER — PROMETHAZINE HYDROCHLORIDE 25 MG/1
TABLET ORAL
Qty: 30 TABLET | Refills: 0 | Status: SHIPPED | OUTPATIENT
Start: 2019-02-25 | End: 2019-08-05 | Stop reason: SDUPTHER

## 2019-02-25 RX ORDER — FLUOXETINE HYDROCHLORIDE 20 MG/1
CAPSULE ORAL
Qty: 30 CAPSULE | Refills: 0 | Status: SHIPPED | OUTPATIENT
Start: 2019-02-25 | End: 2019-04-13 | Stop reason: SDUPTHER

## 2019-02-27 RX ORDER — TIZANIDINE 4 MG/1
TABLET ORAL
Qty: 90 TABLET | Refills: 0 | Status: SHIPPED | OUTPATIENT
Start: 2019-02-27 | End: 2019-04-06 | Stop reason: SDUPTHER

## 2019-03-07 RX ORDER — BUTALBITAL, ACETAMINOPHEN AND CAFFEINE 50; 325; 40 MG/1; MG/1; MG/1
TABLET ORAL
Qty: 90 TABLET | Refills: 0 | Status: SHIPPED | OUTPATIENT
Start: 2019-03-07 | End: 2019-07-03 | Stop reason: SDUPTHER

## 2019-03-07 RX ORDER — LURASIDONE HYDROCHLORIDE 80 MG/1
TABLET, FILM COATED ORAL
Qty: 30 TABLET | Refills: 0 | Status: SHIPPED | OUTPATIENT
Start: 2019-03-07 | End: 2019-04-13 | Stop reason: SDUPTHER

## 2019-03-07 RX ORDER — LURASIDONE HYDROCHLORIDE 80 MG/1
TABLET, FILM COATED ORAL
Qty: 30 TABLET | Refills: 0 | Status: SHIPPED | OUTPATIENT
Start: 2019-03-07 | End: 2019-07-16

## 2019-03-07 RX ORDER — GABAPENTIN 600 MG/1
TABLET ORAL
Qty: 90 TABLET | Refills: 0 | Status: SHIPPED | OUTPATIENT
Start: 2019-03-07 | End: 2019-07-16

## 2019-03-07 RX ORDER — CLONIDINE HYDROCHLORIDE 0.2 MG/1
TABLET ORAL
Qty: 30 TABLET | Refills: 0 | Status: SHIPPED | OUTPATIENT
Start: 2019-03-07 | End: 2019-04-13 | Stop reason: SDUPTHER

## 2019-03-07 RX ORDER — OMEPRAZOLE 40 MG/1
40 CAPSULE, DELAYED RELEASE ORAL EVERY MORNING
Qty: 90 CAPSULE | Refills: 0 | Status: SHIPPED | OUTPATIENT
Start: 2019-03-07 | End: 2019-06-18 | Stop reason: SDUPTHER

## 2019-03-07 RX ORDER — CLONIDINE HYDROCHLORIDE 0.2 MG/1
TABLET ORAL
Qty: 30 TABLET | Refills: 0 | Status: SHIPPED | OUTPATIENT
Start: 2019-03-07 | End: 2019-07-16

## 2019-03-20 DIAGNOSIS — M54.2 CERVICALGIA: ICD-10-CM

## 2019-03-20 DIAGNOSIS — M54.42 LOW BACK PAIN WITH LEFT-SIDED SCIATICA, UNSPECIFIED BACK PAIN LATERALITY, UNSPECIFIED CHRONICITY: ICD-10-CM

## 2019-03-20 RX ORDER — OXYCODONE AND ACETAMINOPHEN 10; 325 MG/1; MG/1
1 TABLET ORAL EVERY 6 HOURS PRN
Qty: 120 TABLET | Refills: 0 | Status: SHIPPED | OUTPATIENT
Start: 2019-03-20 | End: 2019-04-17 | Stop reason: SDUPTHER

## 2019-03-22 ENCOUNTER — LAB VISIT (OUTPATIENT)
Dept: LAB | Facility: HOSPITAL | Age: 44
End: 2019-03-22
Payer: MEDICAID

## 2019-03-22 ENCOUNTER — OFFICE VISIT (OUTPATIENT)
Dept: HEPATOLOGY | Facility: CLINIC | Age: 44
End: 2019-03-22
Payer: MEDICAID

## 2019-03-22 VITALS
WEIGHT: 201.25 LBS | TEMPERATURE: 97 F | HEART RATE: 95 BPM | DIASTOLIC BLOOD PRESSURE: 72 MMHG | HEIGHT: 60 IN | RESPIRATION RATE: 18 BRPM | BODY MASS INDEX: 39.51 KG/M2 | SYSTOLIC BLOOD PRESSURE: 137 MMHG

## 2019-03-22 DIAGNOSIS — B18.2 CHRONIC HEPATITIS C WITHOUT HEPATIC COMA: Primary | ICD-10-CM

## 2019-03-22 DIAGNOSIS — B18.2 CHRONIC HEPATITIS C WITHOUT HEPATIC COMA: ICD-10-CM

## 2019-03-22 LAB
ALBUMIN SERPL BCP-MCNC: 4.1 G/DL
ALP SERPL-CCNC: 142 U/L
ALT SERPL W/O P-5'-P-CCNC: 17 U/L
ANION GAP SERPL CALC-SCNC: 12 MMOL/L
AST SERPL-CCNC: 23 U/L
BASOPHILS # BLD AUTO: 0.1 K/UL
BASOPHILS NFR BLD: 1 %
BILIRUB SERPL-MCNC: 0.8 MG/DL
BUN SERPL-MCNC: 14 MG/DL
CALCIUM SERPL-MCNC: 9.4 MG/DL
CHLORIDE SERPL-SCNC: 102 MMOL/L
CO2 SERPL-SCNC: 25 MMOL/L
CREAT SERPL-MCNC: 0.8 MG/DL
DIFFERENTIAL METHOD: ABNORMAL
EOSINOPHIL # BLD AUTO: 0.1 K/UL
EOSINOPHIL NFR BLD: 1.4 %
ERYTHROCYTE [DISTWIDTH] IN BLOOD BY AUTOMATED COUNT: 13.2 %
EST. GFR  (AFRICAN AMERICAN): >60 ML/MIN/1.73 M^2
EST. GFR  (NON AFRICAN AMERICAN): >60 ML/MIN/1.73 M^2
GLUCOSE SERPL-MCNC: 115 MG/DL
HCT VFR BLD AUTO: 36.2 %
HGB BLD-MCNC: 11.9 G/DL
IMM GRANULOCYTES # BLD AUTO: 0.04 K/UL
IMM GRANULOCYTES NFR BLD AUTO: 0.4 %
INR PPP: 1
LYMPHOCYTES # BLD AUTO: 1.9 K/UL
LYMPHOCYTES NFR BLD: 19.6 %
MCH RBC QN AUTO: 28.3 PG
MCHC RBC AUTO-ENTMCNC: 32.9 G/DL
MCV RBC AUTO: 86 FL
MONOCYTES # BLD AUTO: 0.4 K/UL
MONOCYTES NFR BLD: 3.6 %
NEUTROPHILS # BLD AUTO: 7.3 K/UL
NEUTROPHILS NFR BLD: 74 %
NRBC BLD-RTO: 0 /100 WBC
PLATELET # BLD AUTO: 360 K/UL
PMV BLD AUTO: 8.9 FL
POTASSIUM SERPL-SCNC: 3.6 MMOL/L
PROT SERPL-MCNC: 7.4 G/DL
PROTHROMBIN TIME: 10.2 SEC
RBC # BLD AUTO: 4.2 M/UL
SODIUM SERPL-SCNC: 139 MMOL/L
WBC # BLD AUTO: 9.88 K/UL

## 2019-03-22 PROCEDURE — 99214 PR OFFICE/OUTPT VISIT, EST, LEVL IV, 30-39 MIN: ICD-10-PCS | Mod: S$PBB,,, | Performed by: PHYSICIAN ASSISTANT

## 2019-03-22 PROCEDURE — 36415 COLL VENOUS BLD VENIPUNCTURE: CPT

## 2019-03-22 PROCEDURE — 99214 OFFICE O/P EST MOD 30 MIN: CPT | Mod: S$PBB,,, | Performed by: PHYSICIAN ASSISTANT

## 2019-03-22 PROCEDURE — 99999 PR PBB SHADOW E&M-EST. PATIENT-LVL III: ICD-10-PCS | Mod: PBBFAC,,, | Performed by: PHYSICIAN ASSISTANT

## 2019-03-22 PROCEDURE — 99999 PR PBB SHADOW E&M-EST. PATIENT-LVL III: CPT | Mod: PBBFAC,,, | Performed by: PHYSICIAN ASSISTANT

## 2019-03-22 PROCEDURE — 85025 COMPLETE CBC W/AUTO DIFF WBC: CPT

## 2019-03-22 PROCEDURE — 99213 OFFICE O/P EST LOW 20 MIN: CPT | Mod: PBBFAC | Performed by: PHYSICIAN ASSISTANT

## 2019-03-22 PROCEDURE — 80053 COMPREHEN METABOLIC PANEL: CPT

## 2019-03-22 PROCEDURE — 87522 HEPATITIS C REVRS TRNSCRPJ: CPT

## 2019-03-22 PROCEDURE — 85610 PROTHROMBIN TIME: CPT

## 2019-03-22 NOTE — PROGRESS NOTES
HEPATOLOGY CLINIC VISIT NOTE - HCV clinic    REFERRING PROVIDER: Dr. Rama Lee    CHIEF COMPLAINT: Hepatitis C - discuss treatment    HISTORY: This is a 43 y.o. White female with chronic hepatitis C here for f/u. Labs reveal that she is genotype 1a with a HCV RNA of 10,069 IU/mL. She has elevated transaminases and normal synthetic liver function. Her PLTs are preserved.      She has numerous risk factors for HCV. She has h/o unregulated tattoos and numerous tattoos, she had an unregulated tattoo placed 6 months ago, h/o smoking crack cocaine and IVDU. 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    Reports >6 months clean from IVDU. Attended rehab.     Fibrosis staging unclear in past. Ultrasound noted heterogenous texture of liver and splenomegaly concerning for cirrhosis, although fibroscan was F1-F2. There are no laboratory findings concerning for cirrhosis. APRI and FIB 4 are not suggestive of advanced fibrosis. Given conflicting findings, biopsy pursued.     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20    Liver staging:  Fibroscan F1-F2  Biopsy stage 2   Albumin 4.2   Total Bilirubin 1.4 (H)    (H)    (H)       Past Medical History:   Diagnosis Date    ADHD     Allergy     Asthma     childhood    Bipolar disorder     Cirrhosis of liver     Gestational diabetes mellitus 2001    resolved    Hepatitis C     HTN (hypertension)     Hyperlipidemia     Interstitial cystitis     Migraine headache      OCD (obsessive compulsive disorder)      Past Surgical History:   Procedure Laterality Date    ADENOIDECTOMY      bladder lift      CHOLECYSTECTOMY      HYSTERECTOMY      unilateral oophrectomy    OVARIAN CYST REMOVAL      SINUS SURGERY      TONSILLECTOMY       FAMILY HISTORY: Negative for liver disease    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, 17      Social History     Tobacco Use   Smoking Status Never Smoker   Smokeless Tobacco Never Used     Social History     Substance and Sexual Activity   Alcohol Use No   very rare     Social History     Substance and Sexual Activity   Drug Use No     ROS:   No fever, chills, weight loss  No chest pain, dyspnea  No abdominal pain, nausea, vomiting  No skin rashes   No headaches, visual changes  No lower extremity edema  No depression or anxiety    PHYSICAL EXAM:  Friendly White female, in no acute distress; alert and oriented to person, place and time  VITALS: reviewed  HEENT: Sclerae anicteric.   NECK: Supple  LUNGS: Normal respiratory effort.  ABDOMEN: Flat, soft, nontender.  SKIN: Warm and dry. No jaundice, No obvious rashes.   EXTREMITIES: No lower extremity edema  NEURO/PSYCH: Normal gate. Memory intact. Thought and speech pattern appropriate. Behavior normal. No depression or anxiety noted.    RECENT LABS:  Lab Results   Component Value Date    WBC 7.50 08/24/2018    HGB 10.9 (L) 08/24/2018     08/24/2018     Lab Results   Component Value Date    INR 1.0 07/14/2018     Lab Results   Component Value Date     (H) 08/24/2018     (H) 08/24/2018    BILITOT 1.4 (H) 08/24/2018    ALBUMIN 4.2 08/24/2018    ALKPHOS 145 (H) 08/24/2018    CREATININE 0.7 08/24/2018    BUN 14 08/24/2018     08/24/2018    K 3.5 08/24/2018   RECENT IMAGING:  Ultrasound abdomen 04/2018  Narrative     EXAMINATION:  US ABDOMEN COMPLETE    CLINICAL HISTORY:  Chronic viral hepatitis C    TECHNIQUE:  Complete abdominal ultrasound (including pancreas,  liver, gallbladder, common bile duct, spleen, aorta, IVC, and kidneys) was performed.    COMPARISON:  CT renal stone 01/15/2014 and ultrasound abdomen 05/28/2013    FINDINGS:  Liver: Enlarged measuring 19.5 cm. Hepatic parenchyma is mildly heterogeneous, finding that can be seen in cirrhosis.  No focal hepatic lesions.    Gallbladder: Has been removed.    Biliary system: The common duct is prominent in size, measuring 6 mm.  No intrahepatic ductal dilatation.    Spleen: Mildly enlarged with homogeneous echotexture, measuring 12 x 5.6 cm.    Pancreas: The visualized portions of pancreas appear normal.    Right kidney: Normal in size with no hydronephrosis, measuring 10.6 cm.    Left kidney:  Normal in size with no hydronephrosis, measuring 11.0 cm.    Aorta: No aneurysm.    Inferior vena cava: Normal in appearance.    Miscellaneous: No ascites.   Impression       Mildly heterogeneous liver parenchyma, consistent with cirrhosis.    Cholecystectomy.    Mild splenomegaly.     ASSESSMENT  43 y.o. White female with:  1. CHRONIC HEPATITIS C, GENOTYPE 1a - treatment naive  -- Prior HCV treatment -none, naive   -- Elevated transaminases  -- negative immunity to HAV and HBV, vaccinated for HBV in past. Given high risk behavior, restarted HBV and added HAV series     EDUCATION:  Discussed goal of HCV eradication to prevent progression of liver disease.  Discussed use of Mavyret x 8 weeks w/ potential side effects of fatigue,tiredness, and nausea    Discussed concern about Drug Drug Interactions with other products while on Mavyret. Pt instructed to contact me if other medications are prescribed during course of HCV therapy so potential DDIs can be assessed. Current medlist reviewed; no interactions noted  Herbal / alternative therapies must be avoided  Importance of medication compliance to prevent resistance was reviewed.       PLAN:  1. Obtain authorization to treat HCV with Mavyret daily x 8 weeks; update LFTs  -- Rx will  be routed to [facility]  -- Patient will notify me of exact treatment start date so appropriate lab f/u can be scheduled.  2. F/u at SVR 12    Redd Humphreys PA-C

## 2019-03-25 ENCOUNTER — TELEPHONE (OUTPATIENT)
Dept: PHARMACY | Facility: CLINIC | Age: 44
End: 2019-03-25

## 2019-03-26 LAB
HCV RNA SERPL NAA+PROBE-LOG IU: 4.26 LOG (10) IU/ML
HCV RNA SERPL QL NAA+PROBE: DETECTED IU/ML
HCV RNA SPEC NAA+PROBE-ACNC: ABNORMAL IU/ML

## 2019-04-01 ENCOUNTER — PATIENT MESSAGE (OUTPATIENT)
Dept: INTERNAL MEDICINE | Facility: CLINIC | Age: 44
End: 2019-04-01

## 2019-04-01 RX ORDER — FLUCONAZOLE 150 MG/1
TABLET ORAL
Qty: 2 TABLET | Refills: 0 | Status: SHIPPED | OUTPATIENT
Start: 2019-04-01 | End: 2019-09-04 | Stop reason: SDUPTHER

## 2019-04-08 RX ORDER — TIZANIDINE 4 MG/1
TABLET ORAL
Qty: 90 TABLET | Refills: 3 | Status: SHIPPED | OUTPATIENT
Start: 2019-04-08 | End: 2019-09-04 | Stop reason: SDUPTHER

## 2019-04-10 ENCOUNTER — EPISODE CHANGES (OUTPATIENT)
Dept: HEPATOLOGY | Facility: CLINIC | Age: 44
End: 2019-04-10

## 2019-04-13 RX ORDER — GABAPENTIN 600 MG/1
TABLET ORAL
Qty: 90 TABLET | Refills: 0 | Status: SHIPPED | OUTPATIENT
Start: 2019-04-13 | End: 2019-05-16 | Stop reason: SDUPTHER

## 2019-04-13 RX ORDER — FLUOXETINE HYDROCHLORIDE 20 MG/1
CAPSULE ORAL
Qty: 30 CAPSULE | Refills: 0 | Status: SHIPPED | OUTPATIENT
Start: 2019-04-13 | End: 2019-05-16 | Stop reason: SDUPTHER

## 2019-04-13 RX ORDER — LURASIDONE HYDROCHLORIDE 80 MG/1
TABLET, FILM COATED ORAL
Qty: 30 TABLET | Refills: 0 | Status: SHIPPED | OUTPATIENT
Start: 2019-04-13 | End: 2019-05-16 | Stop reason: SDUPTHER

## 2019-04-13 RX ORDER — CLONIDINE HYDROCHLORIDE 0.2 MG/1
TABLET ORAL
Qty: 30 TABLET | Refills: 0 | Status: SHIPPED | OUTPATIENT
Start: 2019-04-13 | End: 2019-05-16 | Stop reason: SDUPTHER

## 2019-04-13 RX ORDER — ESTRADIOL 2 MG/1
TABLET ORAL
Qty: 90 TABLET | Refills: 0 | Status: SHIPPED | OUTPATIENT
Start: 2019-04-13 | End: 2019-11-04 | Stop reason: SDUPTHER

## 2019-04-13 RX ORDER — OXYBUTYNIN CHLORIDE 5 MG/1
TABLET ORAL
Qty: 270 TABLET | Refills: 0 | Status: SHIPPED | OUTPATIENT
Start: 2019-04-13 | End: 2019-10-24 | Stop reason: SDUPTHER

## 2019-04-17 DIAGNOSIS — M54.42 LOW BACK PAIN WITH LEFT-SIDED SCIATICA, UNSPECIFIED BACK PAIN LATERALITY, UNSPECIFIED CHRONICITY: ICD-10-CM

## 2019-04-17 DIAGNOSIS — M54.2 CERVICALGIA: ICD-10-CM

## 2019-04-17 RX ORDER — OXYCODONE AND ACETAMINOPHEN 10; 325 MG/1; MG/1
1 TABLET ORAL EVERY 6 HOURS PRN
Qty: 120 TABLET | Refills: 0 | Status: SHIPPED | OUTPATIENT
Start: 2019-04-17 | End: 2019-05-16 | Stop reason: SDUPTHER

## 2019-04-18 ENCOUNTER — PATIENT MESSAGE (OUTPATIENT)
Dept: INTERNAL MEDICINE | Facility: CLINIC | Age: 44
End: 2019-04-18

## 2019-04-24 RX ORDER — CLONAZEPAM 2 MG/1
TABLET ORAL
Qty: 30 TABLET | Refills: 3 | Status: SHIPPED | OUTPATIENT
Start: 2019-04-24 | End: 2019-08-26 | Stop reason: SDUPTHER

## 2019-04-24 RX ORDER — CLONAZEPAM 1 MG/1
TABLET ORAL
Qty: 90 TABLET | Refills: 3 | Status: SHIPPED | OUTPATIENT
Start: 2019-04-24 | End: 2019-08-26 | Stop reason: SDUPTHER

## 2019-05-06 NOTE — TELEPHONE ENCOUNTER
Patients Mavyret was denied by her insurance. We will be assisting the patient in applying to the . Sending a staff message to Redd Humphreys regarding assistance and faxing application prescription for her review and signature.

## 2019-05-07 ENCOUNTER — EPISODE CHANGES (OUTPATIENT)
Dept: HEPATOLOGY | Facility: CLINIC | Age: 44
End: 2019-05-07

## 2019-05-08 RX ORDER — IBUPROFEN 800 MG/1
TABLET ORAL
Qty: 90 TABLET | Refills: 0 | Status: SHIPPED | OUTPATIENT
Start: 2019-05-08 | End: 2019-07-02 | Stop reason: SDUPTHER

## 2019-05-08 RX ORDER — ZOLPIDEM TARTRATE 10 MG/1
TABLET ORAL
Qty: 30 TABLET | Refills: 0 | Status: SHIPPED | OUTPATIENT
Start: 2019-05-08 | End: 2019-07-02 | Stop reason: SDUPTHER

## 2019-05-16 DIAGNOSIS — M54.2 CERVICALGIA: ICD-10-CM

## 2019-05-16 DIAGNOSIS — M54.42 LOW BACK PAIN WITH LEFT-SIDED SCIATICA, UNSPECIFIED BACK PAIN LATERALITY, UNSPECIFIED CHRONICITY: ICD-10-CM

## 2019-05-16 RX ORDER — OXYCODONE AND ACETAMINOPHEN 10; 325 MG/1; MG/1
1 TABLET ORAL EVERY 6 HOURS PRN
Qty: 120 TABLET | Refills: 0 | Status: SHIPPED | OUTPATIENT
Start: 2019-05-16 | End: 2019-06-13 | Stop reason: SDUPTHER

## 2019-05-16 RX ORDER — CLONIDINE HYDROCHLORIDE 0.2 MG/1
TABLET ORAL
Qty: 30 TABLET | Refills: 0 | Status: SHIPPED | OUTPATIENT
Start: 2019-05-16 | End: 2019-07-16

## 2019-05-16 RX ORDER — GABAPENTIN 600 MG/1
TABLET ORAL
Qty: 90 TABLET | Refills: 0 | Status: SHIPPED | OUTPATIENT
Start: 2019-05-16 | End: 2019-06-26 | Stop reason: SDUPTHER

## 2019-05-16 RX ORDER — LURASIDONE HYDROCHLORIDE 80 MG/1
TABLET, FILM COATED ORAL
Qty: 30 TABLET | Refills: 0 | Status: SHIPPED | OUTPATIENT
Start: 2019-05-16 | End: 2019-07-16 | Stop reason: SDUPTHER

## 2019-05-16 RX ORDER — FLUOXETINE HYDROCHLORIDE 20 MG/1
CAPSULE ORAL
Qty: 30 CAPSULE | Refills: 0 | Status: SHIPPED | OUTPATIENT
Start: 2019-05-16 | End: 2019-07-03 | Stop reason: SDUPTHER

## 2019-05-17 NOTE — TELEPHONE ENCOUNTER
FOR DOCUMENTATION ONLY:  Financial Assistance for Mavyret is approved from 5-14-19 to 11-10-19  Source [employer] Patient Assistance    Sending a staff message to  Redd Humphreys regarding approval

## 2019-05-23 ENCOUNTER — PATIENT MESSAGE (OUTPATIENT)
Dept: INTERNAL MEDICINE | Facility: CLINIC | Age: 44
End: 2019-05-23

## 2019-05-27 RX ORDER — SULFAMETHOXAZOLE AND TRIMETHOPRIM 800; 160 MG/1; MG/1
TABLET ORAL
Qty: 10 TABLET | Refills: 0 | OUTPATIENT
Start: 2019-05-27

## 2019-05-28 ENCOUNTER — TELEPHONE (OUTPATIENT)
Dept: HEPATOLOGY | Facility: CLINIC | Age: 44
End: 2019-05-28

## 2019-05-28 ENCOUNTER — EPISODE CHANGES (OUTPATIENT)
Dept: HEPATOLOGY | Facility: CLINIC | Age: 44
End: 2019-05-28

## 2019-05-28 NOTE — TELEPHONE ENCOUNTER
Attempt made to reach patient.  Left msg with her mom asking that she give us a call regarding Mavyret start date.  Also msg from provider mailed to her with a note stressing that she call.

## 2019-05-28 NOTE — TELEPHONE ENCOUNTER
----- Message from Redd Humphreys PA-C sent at 5/28/2019 11:13 AM CDT -----  Regarding: FW: Mavyret Assistance Approval  Has she received RX?   ----- Message -----  From: Valerie Todd  Sent: 5/17/2019   8:05 AM  To: Redd Humphreys PA-C  Subject: Mavyret Assistance Approval                       Patient was approved to receive her Mavyret from [employer] Patient Assistance through 11-10-19 (8 weeks). Patient has been informed.   Thank you  Anette  [phone]

## 2019-06-11 ENCOUNTER — TELEPHONE (OUTPATIENT)
Dept: PHARMACY | Facility: CLINIC | Age: 44
End: 2019-06-11

## 2019-06-11 NOTE — TELEPHONE ENCOUNTER
Initial Medication Consultation: Mavyret     Initial Mavyret consult completed on . Mavyret was received from [employer]. Patient will start Mavyret on .  Confirmed 2 patient identifiers - name and . Therapy Appropriate.     Mavyret 100/40mg- Take three tablets by mouth once daily WITH FOOD x 8 weeks  Counseling was reviewed:   1. Patient MUST take Mavyret at the SAME time every day.   2. Potential Side effects include: nausea, headaches, diarrhea and fatigue.   Headache: Patient may treat with OTC remedies. If Tylenol is used, dose should not exceed 2000mg per day.    4. Medication list reviewed. No DDIs or allergies noted. Patient MUST contact myself or provider prior to starting any new OTC, herbal, or prescription drugs to avoid potential DDIs.    DDI: Medication list reviewed and potential DDIs addressed.    Discussed the importance of staying well hydrated while on therapy. Compliance stressed - patient to take missed doses as soon as remembered, but NOT to take 2 doses in one day. Patient will report questions or concerns to myself or practitioner. Patient verbalizes understanding. Patient plans to start Mavyret on .  Consultation included: indication; goals of treatment; administration; storage and handling; side effects; how to handle side effects; the importance of compliance; how to handle missed doses; the importance of laboratory monitoring; the importance of keeping all follow up appointments.  Patient understands to report any medication changes to [facility] and provider. All questions answered and addressed to patients satisfaction. Aware to call [employer] [facility] for medication refills when she has 7-10 days of medication on hand.    Keira Paz, PharmD, [facility]  Clinical Pharmacist   [facility]  Phone: [phone]

## 2019-06-13 ENCOUNTER — EPISODE CHANGES (OUTPATIENT)
Dept: HEPATOLOGY | Facility: CLINIC | Age: 44
End: 2019-06-13

## 2019-06-13 DIAGNOSIS — M54.2 CERVICALGIA: ICD-10-CM

## 2019-06-13 DIAGNOSIS — M54.42 LOW BACK PAIN WITH LEFT-SIDED SCIATICA, UNSPECIFIED BACK PAIN LATERALITY, UNSPECIFIED CHRONICITY: ICD-10-CM

## 2019-06-13 RX ORDER — OXYCODONE AND ACETAMINOPHEN 10; 325 MG/1; MG/1
1 TABLET ORAL EVERY 6 HOURS PRN
Qty: 120 TABLET | Refills: 0 | Status: SHIPPED | OUTPATIENT
Start: 2019-06-13 | End: 2019-07-11 | Stop reason: SDUPTHER

## 2019-06-13 RX ORDER — OXYCODONE AND ACETAMINOPHEN 10; 325 MG/1; MG/1
1 TABLET ORAL EVERY 6 HOURS PRN
Qty: 120 TABLET | Refills: 0 | Status: CANCELLED | OUTPATIENT
Start: 2019-06-13

## 2019-06-14 ENCOUNTER — PATIENT MESSAGE (OUTPATIENT)
Dept: INTERNAL MEDICINE | Facility: CLINIC | Age: 44
End: 2019-06-14

## 2019-06-18 RX ORDER — OMEPRAZOLE 40 MG/1
CAPSULE, DELAYED RELEASE ORAL
Qty: 90 CAPSULE | Refills: 3 | Status: SHIPPED | OUTPATIENT
Start: 2019-06-18 | End: 2020-01-20

## 2019-06-19 ENCOUNTER — TELEPHONE (OUTPATIENT)
Dept: HEPATOLOGY | Facility: CLINIC | Age: 44
End: 2019-06-19

## 2019-06-19 DIAGNOSIS — B18.2 CHRONIC HEPATITIS C WITHOUT HEPATIC COMA: Primary | ICD-10-CM

## 2019-06-19 NOTE — TELEPHONE ENCOUNTER
Pt beginning 8 weeks of Mavyret on 06/11  F2  - CMP and HCV RNA at week 6- 07/23  - HCV RNA at SVR 12- 10/29  F/u 1 week post SVR

## 2019-06-19 NOTE — TELEPHONE ENCOUNTER
Attempt made twice to reach pt. LVM stating labs will be scheduled at main campus, if pt wants them completed locally to contact us. Pt labs scheduled as instructed. Reminder notices mailed to pt.

## 2019-06-21 ENCOUNTER — EPISODE CHANGES (OUTPATIENT)
Dept: HEPATOLOGY | Facility: CLINIC | Age: 44
End: 2019-06-21

## 2019-06-26 RX ORDER — GABAPENTIN 600 MG/1
600 TABLET ORAL 3 TIMES DAILY
Qty: 90 TABLET | Refills: 3 | Status: SHIPPED | OUTPATIENT
Start: 2019-06-26 | End: 2019-07-16 | Stop reason: SDUPTHER

## 2019-07-03 ENCOUNTER — PATIENT MESSAGE (OUTPATIENT)
Dept: INTERNAL MEDICINE | Facility: CLINIC | Age: 44
End: 2019-07-03

## 2019-07-03 DIAGNOSIS — Z00.00 ROUTINE GENERAL MEDICAL EXAMINATION AT A HEALTH CARE FACILITY: Primary | ICD-10-CM

## 2019-07-03 RX ORDER — FLUOXETINE HYDROCHLORIDE 20 MG/1
CAPSULE ORAL
Qty: 30 CAPSULE | Refills: 0 | Status: SHIPPED | OUTPATIENT
Start: 2019-07-03 | End: 2020-04-13 | Stop reason: SDUPTHER

## 2019-07-03 RX ORDER — CLONIDINE HYDROCHLORIDE 0.2 MG/1
TABLET ORAL
Qty: 30 TABLET | Refills: 0 | Status: SHIPPED | OUTPATIENT
Start: 2019-07-03 | End: 2019-07-16 | Stop reason: SDUPTHER

## 2019-07-03 RX ORDER — ZOLPIDEM TARTRATE 10 MG/1
TABLET ORAL
Qty: 30 TABLET | Refills: 0 | Status: CANCELLED | OUTPATIENT
Start: 2019-07-03

## 2019-07-03 RX ORDER — ZOLPIDEM TARTRATE 10 MG/1
TABLET ORAL
Qty: 30 TABLET | Refills: 0 | Status: SHIPPED | OUTPATIENT
Start: 2019-07-03 | End: 2019-08-29 | Stop reason: SDUPTHER

## 2019-07-03 RX ORDER — IBUPROFEN 800 MG/1
TABLET ORAL
Qty: 90 TABLET | Refills: 0 | Status: SHIPPED | OUTPATIENT
Start: 2019-07-03 | End: 2019-07-03 | Stop reason: SDUPTHER

## 2019-07-03 RX ORDER — BUTALBITAL, ACETAMINOPHEN AND CAFFEINE 50; 325; 40 MG/1; MG/1; MG/1
1 TABLET ORAL EVERY 6 HOURS PRN
Qty: 90 TABLET | Refills: 0 | Status: SHIPPED | OUTPATIENT
Start: 2019-07-03 | End: 2019-08-05 | Stop reason: SDUPTHER

## 2019-07-03 RX ORDER — FLUOXETINE HYDROCHLORIDE 20 MG/1
CAPSULE ORAL
Qty: 30 CAPSULE | Refills: 0 | Status: CANCELLED | OUTPATIENT
Start: 2019-07-03

## 2019-07-03 RX ORDER — IBUPROFEN 800 MG/1
800 TABLET ORAL 3 TIMES DAILY PRN
Qty: 90 TABLET | Refills: 0 | Status: CANCELLED | OUTPATIENT
Start: 2019-07-03

## 2019-07-03 RX ORDER — IBUPROFEN 800 MG/1
800 TABLET ORAL 3 TIMES DAILY PRN
Qty: 90 TABLET | Refills: 0 | Status: SHIPPED | OUTPATIENT
Start: 2019-07-03 | End: 2019-08-26 | Stop reason: SDUPTHER

## 2019-07-11 ENCOUNTER — PATIENT MESSAGE (OUTPATIENT)
Dept: INTERNAL MEDICINE | Facility: CLINIC | Age: 44
End: 2019-07-11

## 2019-07-11 DIAGNOSIS — M54.42 LOW BACK PAIN WITH LEFT-SIDED SCIATICA, UNSPECIFIED BACK PAIN LATERALITY, UNSPECIFIED CHRONICITY: ICD-10-CM

## 2019-07-11 DIAGNOSIS — M54.2 CERVICALGIA: ICD-10-CM

## 2019-07-12 RX ORDER — OXYCODONE AND ACETAMINOPHEN 10; 325 MG/1; MG/1
1 TABLET ORAL EVERY 6 HOURS PRN
Qty: 120 TABLET | Refills: 0 | Status: SHIPPED | OUTPATIENT
Start: 2019-07-12 | End: 2019-08-07 | Stop reason: SDUPTHER

## 2019-07-12 NOTE — TELEPHONE ENCOUNTER
Pt notified via phone that Rx is ready to  at our  and she needs to make an appt before next refill.    Pt verbalized understanding and stated she would make appt when she  the Rx.

## 2019-07-16 RX ORDER — LURASIDONE HYDROCHLORIDE 80 MG/1
80 TABLET, FILM COATED ORAL DAILY
Qty: 30 TABLET | Refills: 0 | Status: SHIPPED | OUTPATIENT
Start: 2019-07-16 | End: 2019-07-16

## 2019-07-16 RX ORDER — LURASIDONE HYDROCHLORIDE 80 MG/1
80 TABLET, FILM COATED ORAL DAILY
Qty: 30 TABLET | Refills: 3 | Status: SHIPPED | OUTPATIENT
Start: 2019-07-16 | End: 2019-11-04 | Stop reason: SDUPTHER

## 2019-07-16 RX ORDER — GABAPENTIN 600 MG/1
600 TABLET ORAL 3 TIMES DAILY
Qty: 90 TABLET | Refills: 3 | Status: SHIPPED | OUTPATIENT
Start: 2019-07-16 | End: 2019-11-25 | Stop reason: SDUPTHER

## 2019-07-16 RX ORDER — GABAPENTIN 600 MG/1
600 TABLET ORAL 3 TIMES DAILY
Qty: 90 TABLET | Refills: 3 | Status: SHIPPED | OUTPATIENT
Start: 2019-07-16 | End: 2019-07-16

## 2019-07-16 RX ORDER — LURASIDONE HYDROCHLORIDE 80 MG/1
80 TABLET, FILM COATED ORAL DAILY
Qty: 30 TABLET | Refills: 3 | Status: CANCELLED | OUTPATIENT
Start: 2019-07-16

## 2019-07-16 RX ORDER — CLONIDINE HYDROCHLORIDE 0.2 MG/1
0.2 TABLET ORAL DAILY
Qty: 30 TABLET | Refills: 0 | Status: SHIPPED | OUTPATIENT
Start: 2019-07-16 | End: 2019-07-16

## 2019-07-16 RX ORDER — LURASIDONE HYDROCHLORIDE 80 MG/1
80 TABLET, FILM COATED ORAL DAILY
Qty: 30 TABLET | Refills: 3 | Status: SHIPPED | OUTPATIENT
Start: 2019-07-16 | End: 2019-07-16 | Stop reason: SDUPTHER

## 2019-07-16 RX ORDER — CLONIDINE HYDROCHLORIDE 0.2 MG/1
0.2 TABLET ORAL DAILY
Qty: 30 TABLET | Refills: 3 | Status: SHIPPED | OUTPATIENT
Start: 2019-07-16 | End: 2020-04-13 | Stop reason: SDUPTHER

## 2019-07-16 NOTE — TELEPHONE ENCOUNTER
----- Message from Shari Barnes sent at 7/15/2019  4:41 PM CDT -----  Contact: self   Pt would like to speak to nurse, state her physical 09/26   RX today 07/15 which will last till middle of August .Ask can doctor give her medication just to hold her until she come in  9/26.    Please advise

## 2019-07-19 ENCOUNTER — TELEPHONE (OUTPATIENT)
Dept: INTERNAL MEDICINE | Facility: CLINIC | Age: 44
End: 2019-07-19

## 2019-07-29 ENCOUNTER — TELEPHONE (OUTPATIENT)
Dept: PHARMACY | Facility: CLINIC | Age: 44
End: 2019-07-29

## 2019-08-05 RX ORDER — BUTALBITAL, ACETAMINOPHEN AND CAFFEINE 50; 325; 40 MG/1; MG/1; MG/1
1 TABLET ORAL EVERY 6 HOURS PRN
Qty: 90 TABLET | Refills: 0 | Status: SHIPPED | OUTPATIENT
Start: 2019-08-05 | End: 2019-09-04 | Stop reason: SDUPTHER

## 2019-08-05 RX ORDER — PROMETHAZINE HYDROCHLORIDE 25 MG/1
25 TABLET ORAL EVERY 6 HOURS PRN
Qty: 30 TABLET | Refills: 0 | Status: SHIPPED | OUTPATIENT
Start: 2019-08-05 | End: 2023-03-02 | Stop reason: SDUPTHER

## 2019-08-06 ENCOUNTER — PATIENT MESSAGE (OUTPATIENT)
Dept: HEPATOLOGY | Facility: CLINIC | Age: 44
End: 2019-08-06

## 2019-08-06 ENCOUNTER — PATIENT MESSAGE (OUTPATIENT)
Dept: INTERNAL MEDICINE | Facility: CLINIC | Age: 44
End: 2019-08-06

## 2019-08-07 DIAGNOSIS — M54.2 CERVICALGIA: ICD-10-CM

## 2019-08-07 DIAGNOSIS — M54.42 LOW BACK PAIN WITH LEFT-SIDED SCIATICA, UNSPECIFIED BACK PAIN LATERALITY, UNSPECIFIED CHRONICITY: ICD-10-CM

## 2019-08-09 RX ORDER — OXYCODONE AND ACETAMINOPHEN 10; 325 MG/1; MG/1
1 TABLET ORAL EVERY 6 HOURS PRN
Qty: 120 TABLET | Refills: 0 | Status: SHIPPED | OUTPATIENT
Start: 2019-08-09 | End: 2019-09-16 | Stop reason: SDUPTHER

## 2019-08-09 RX ORDER — LAMOTRIGINE 200 MG/1
200 TABLET ORAL 2 TIMES DAILY
Qty: 180 TABLET | Refills: 3 | Status: SHIPPED | OUTPATIENT
Start: 2019-08-09 | End: 2019-11-04 | Stop reason: SDUPTHER

## 2019-08-12 ENCOUNTER — PATIENT MESSAGE (OUTPATIENT)
Dept: INTERNAL MEDICINE | Facility: CLINIC | Age: 44
End: 2019-08-12

## 2019-08-27 RX ORDER — CLONAZEPAM 1 MG/1
1 TABLET ORAL 3 TIMES DAILY
Qty: 90 TABLET | Refills: 3 | Status: SHIPPED | OUTPATIENT
Start: 2019-08-27 | End: 2019-12-16 | Stop reason: SDUPTHER

## 2019-08-27 RX ORDER — IBUPROFEN 800 MG/1
800 TABLET ORAL 3 TIMES DAILY PRN
Qty: 90 TABLET | Refills: 0 | Status: SHIPPED | OUTPATIENT
Start: 2019-08-27 | End: 2019-09-24 | Stop reason: SDUPTHER

## 2019-08-27 RX ORDER — CLONAZEPAM 2 MG/1
2 TABLET ORAL DAILY
Qty: 30 TABLET | Refills: 3 | Status: SHIPPED | OUTPATIENT
Start: 2019-08-27 | End: 2019-12-16 | Stop reason: SDUPTHER

## 2019-08-29 RX ORDER — ZOLPIDEM TARTRATE 10 MG/1
10 TABLET ORAL NIGHTLY PRN
Qty: 30 TABLET | Refills: 3 | Status: SHIPPED | OUTPATIENT
Start: 2019-08-29 | End: 2020-02-14

## 2019-09-03 ENCOUNTER — OFFICE VISIT (OUTPATIENT)
Dept: URGENT CARE | Facility: CLINIC | Age: 44
End: 2019-09-03
Payer: MEDICAID

## 2019-09-03 VITALS
BODY MASS INDEX: 38.67 KG/M2 | HEART RATE: 105 BPM | WEIGHT: 198 LBS | TEMPERATURE: 98 F | OXYGEN SATURATION: 95 % | DIASTOLIC BLOOD PRESSURE: 80 MMHG | SYSTOLIC BLOOD PRESSURE: 134 MMHG | RESPIRATION RATE: 16 BRPM

## 2019-09-03 DIAGNOSIS — F42.4 COMPULSIVE SKIN PICKING: ICD-10-CM

## 2019-09-03 DIAGNOSIS — L02.415 CELLULITIS AND ABSCESS OF RIGHT LEG: Primary | ICD-10-CM

## 2019-09-03 DIAGNOSIS — L03.115 CELLULITIS AND ABSCESS OF RIGHT LEG: Primary | ICD-10-CM

## 2019-09-03 PROCEDURE — 99214 PR OFFICE/OUTPT VISIT, EST, LEVL IV, 30-39 MIN: ICD-10-PCS | Mod: S$GLB,,, | Performed by: NURSE PRACTITIONER

## 2019-09-03 PROCEDURE — 99214 OFFICE O/P EST MOD 30 MIN: CPT | Mod: S$GLB,,, | Performed by: NURSE PRACTITIONER

## 2019-09-03 RX ORDER — MUPIROCIN 20 MG/G
OINTMENT TOPICAL 3 TIMES DAILY
Qty: 30 G | Refills: 1 | Status: SHIPPED | OUTPATIENT
Start: 2019-09-03 | End: 2023-03-02

## 2019-09-03 RX ORDER — SULFAMETHOXAZOLE AND TRIMETHOPRIM 800; 160 MG/1; MG/1
2 TABLET ORAL 2 TIMES DAILY
Qty: 28 TABLET | Refills: 0 | Status: SHIPPED | OUTPATIENT
Start: 2019-09-03 | End: 2019-09-10

## 2019-09-04 RX ORDER — BUTALBITAL, ACETAMINOPHEN AND CAFFEINE 50; 325; 40 MG/1; MG/1; MG/1
1 TABLET ORAL EVERY 6 HOURS PRN
Qty: 90 TABLET | Refills: 0 | Status: SHIPPED | OUTPATIENT
Start: 2019-09-04 | End: 2019-10-22 | Stop reason: SDUPTHER

## 2019-09-04 RX ORDER — PROMETHAZINE HYDROCHLORIDE 25 MG/1
TABLET ORAL
Qty: 30 TABLET | Refills: 0 | Status: SHIPPED | OUTPATIENT
Start: 2019-09-04 | End: 2019-11-18 | Stop reason: SDUPTHER

## 2019-09-04 RX ORDER — FLUCONAZOLE 150 MG/1
TABLET ORAL
Qty: 2 TABLET | Refills: 0 | Status: SHIPPED | OUTPATIENT
Start: 2019-09-04 | End: 2019-10-24 | Stop reason: SDUPTHER

## 2019-09-04 RX ORDER — TIZANIDINE 4 MG/1
4 TABLET ORAL EVERY 8 HOURS PRN
Qty: 90 TABLET | Refills: 3 | Status: SHIPPED | OUTPATIENT
Start: 2019-09-04 | End: 2020-01-13

## 2019-09-04 NOTE — PROGRESS NOTES
"Subjective: sores on legs, possible abscess on right leg, drainage       Patient ID: Esther Gage is a 44 y.o. female.    Vitals:  weight is 89.8 kg (198 lb). Her temperature is 97.5 °F (36.4 °C). Her blood pressure is 134/80 and her pulse is 105. Her respiration is 16 and oxygen saturation is 95%.     Chief Complaint: Abscess (sores on legs, possible abscess on right leg)    Reports constantly picking at skin, "I have a bad habit using tweezers and nippers"    Abscess   Chronicity:  RecurrentProgression Since Onset: spreading  Location:  Leg  Associated Symptoms: no fever, no chills  Characteristics: draining, painful and dryness        Constitution: Negative for chills and fever.   HENT: Negative for facial swelling and sore throat.    Neck: Negative for painful lymph nodes.   Eyes: Negative for eye itching and eyelid swelling.   Respiratory: Negative for cough.    Musculoskeletal: Negative for joint pain and joint swelling.   Skin: Positive for rash, skin thickening/induration, erythema and abscess. Negative for color change, pale, wound, abrasion, laceration, lesion, puncture wound, bruising, avulsion and hives.   Allergic/Immunologic: Negative for environmental allergies, immunocompromised state and hives.   Hematologic/Lymphatic: Negative for swollen lymph nodes.       Objective:      Physical Exam   Constitutional: She is oriented to person, place, and time. She appears well-developed and well-nourished.   HENT:   Head: Normocephalic and atraumatic. Head is without abrasion, without contusion and without laceration.   Right Ear: External ear normal.   Left Ear: External ear normal.   Nose: Nose normal.   Mouth/Throat: Oropharynx is clear and moist.   Eyes: Pupils are equal, round, and reactive to light. Conjunctivae, EOM and lids are normal.   Neck: Trachea normal, full passive range of motion without pain and phonation normal. Neck supple.   Cardiovascular: Normal rate, regular rhythm and normal heart " sounds.   Pulmonary/Chest: Effort normal and breath sounds normal. No stridor. No respiratory distress.   Musculoskeletal: Normal range of motion.   Neurological: She is alert and oriented to person, place, and time.   Skin: Skin is warm, dry and intact. Capillary refill takes less than 2 seconds. Rash noted. No abrasion, no bruising, no burn, no ecchymosis, no laceration and no lesion noted. Rash is pustular. There is erythema.        Multiple wounds to biilateral upper and lower extremities consistent with picking  Large area of erythema to right anterior lower leg with induration, abscess to center draining purulent fluid   Psychiatric: She has a normal mood and affect. Her speech is normal and behavior is normal. Judgment and thought content normal. Cognition and memory are normal.   Nursing note and vitals reviewed.      Assessment:       1. Cellulitis and abscess of right leg    2. Compulsive skin picking        Plan:       Recommended using a fidget spinner or cube, or clicker to stop picking at skin  Cellulitis and abscess of right leg    Compulsive skin picking    Other orders  -     sulfamethoxazole-trimethoprim 800-160mg (BACTRIM DS) 800-160 mg Tab; Take 2 tablets by mouth 2 (two) times daily. for 7 days  Dispense: 28 tablet; Refill: 0  -     mupirocin (BACTROBAN) 2 % ointment; Apply topically 3 (three) times daily.  Dispense: 30 g; Refill: 1

## 2019-09-04 NOTE — PATIENT INSTRUCTIONS
Cellulitis  Cellulitis is an infection of the deep layers of skin. A break in the skin, such as a cut or scratch, can let bacteria under the skin. If the bacteria get to deep layers of the skin, it can be serious. If not treated, cellulitis can get into the bloodstream and lymph nodes. The infection can then spread throughout the body. This causes serious illness.  Cellulitis causes the affected skin to become red, swollen, warm, and sore. The reddened areas have a visible border. An open sore may leak fluid (pus). You may have a fever, chills, and pain.  Cellulitis is treated with antibiotics taken for 7 to 10 days. An open sore may be cleaned and covered with cool wet gauze. Symptoms should get better 1 to 2 days after treatment is started. Make sure to take all the antibiotics for the full number of days until they are gone. Keep taking the medicine even if your symptoms go away.  Home care  Follow these tips:  · Limit the use of the part of your body with cellulitis.   · If the infection is on your leg, keep your leg raised while sitting. This will help to reduce swelling.  · Take all of the antibiotic medicine exactly as directed until it is gone. Do not miss any doses, especially during the first 7 days. Dont stop taking the medicine when your symptoms get better.  · Keep the affected area clean and dry.  · Wash your hands with soap and warm water before and after touching your skin. Anyone else who touches your skin should also wash his or her hands. Don't share towels.  Follow-up care  Follow up with your healthcare provider, or as advised. If your infection does not go away on the first antibiotic, your healthcare provider will prescribe a different one.  When to seek medical advice  Call your healthcare provider right away if any of these occur:  · Red areas that spread  · Swelling or pain that gets worse  · Fluid leaking from the skin (pus)  · Fever higher of 100.4º F (38.0º C) or higher after 2 days  on antibiotics  Date Last Reviewed: 9/1/2016  © 4537-4125 [employer]. [address] [place] [address], [place] [address]. All rights reserved. This information is not intended as a substitute for professional medical care. Always follow your healthcare professional's instructions.        Abscess (Antibiotic Treatment Only)  An abscess (sometimes called a boil) happens when bacteria get trapped under the skin and start to grow. Pus forms inside the abscess as the body responds to the bacteria. An abscess can happen with an insect bite, ingrown hair, blocked oil gland, pimple, cyst, or puncture wound.  In the early stages, your wound may be red and tender. For this stage, you may get antibiotics. If the abscess does not get better with antibiotics, it will need to be drained with a small cut.  Home care  These tips will help you care for your abscess at home:  · Soak the wound in hot water or apply hot packs (small towel soaked in hot water) to the area for 20 minutes at a time. Do this 3 to 4 times a day.  · Do not cut, squeeze, or pop the boil yourself.  · Apply antibiotic cream or ointment to the skin 3 to 4 times a day, unless something else was prescribed. Some ointments include an antibiotic plus a pain reliever.  · If your doctor prescribed antibiotics, do not stop taking them until you have finished the medicine or the doctor tells you to stop.  · You may use an over-the-counter pain medicine to control pain, unless another pain medicine was prescribed. If you have chronic liver or kidney disease or ever had a stomach ulcer or gastrointestinal bleeding, talk with your doctor before using these any of these.  Follow-up care  Follow up with your healthcare provider, or as advised. Check your wound each day for the signs of worsening infection listed below.  When to seek medical advice  Get prompt medical attention if any of these occur:  · An increase in redness or swelling  · Red streaks in the skin  leading away from the abscess  · An increase in local pain or swelling  · Fever of 100.4ºF (38ºC) or higher, or as directed by your healthcare provider  · Pus or fluid coming from the abscess  · Boil returns after getting better  Date Last Reviewed: 9/1/2016  © 9244-7823 [employer]. [address] [place] [address], [place] [address]. All rights reserved. This information is not intended as a substitute for professional medical care. Always follow your healthcare professional's instructions.

## 2019-09-15 ENCOUNTER — PATIENT MESSAGE (OUTPATIENT)
Dept: INTERNAL MEDICINE | Facility: CLINIC | Age: 44
End: 2019-09-15

## 2019-09-16 DIAGNOSIS — M54.42 LOW BACK PAIN WITH LEFT-SIDED SCIATICA, UNSPECIFIED BACK PAIN LATERALITY, UNSPECIFIED CHRONICITY: ICD-10-CM

## 2019-09-16 DIAGNOSIS — M54.2 CERVICALGIA: ICD-10-CM

## 2019-09-16 RX ORDER — OXYCODONE AND ACETAMINOPHEN 10; 325 MG/1; MG/1
1 TABLET ORAL EVERY 6 HOURS PRN
Qty: 120 TABLET | Refills: 0 | Status: SHIPPED | OUTPATIENT
Start: 2019-09-16 | End: 2019-10-22 | Stop reason: SDUPTHER

## 2019-09-24 RX ORDER — IBUPROFEN 800 MG/1
TABLET ORAL
Qty: 90 TABLET | Refills: 0 | Status: SHIPPED | OUTPATIENT
Start: 2019-09-24 | End: 2019-10-20 | Stop reason: SDUPTHER

## 2019-10-08 ENCOUNTER — PATIENT MESSAGE (OUTPATIENT)
Dept: INTERNAL MEDICINE | Facility: CLINIC | Age: 44
End: 2019-10-08

## 2019-10-08 RX ORDER — ESTRADIOL 2 MG/1
TABLET ORAL
Qty: 90 TABLET | Refills: 0 | OUTPATIENT
Start: 2019-10-08

## 2019-10-08 RX ORDER — LURASIDONE HYDROCHLORIDE 80 MG/1
TABLET, FILM COATED ORAL
Qty: 30 TABLET | Refills: 0 | OUTPATIENT
Start: 2019-10-08

## 2019-10-08 RX ORDER — BUTALBITAL, ACETAMINOPHEN AND CAFFEINE 50; 325; 40 MG/1; MG/1; MG/1
TABLET ORAL
Qty: 90 TABLET | Refills: 0 | OUTPATIENT
Start: 2019-10-08

## 2019-10-09 RX ORDER — BUTALBITAL, ACETAMINOPHEN AND CAFFEINE 50; 325; 40 MG/1; MG/1; MG/1
TABLET ORAL
Qty: 90 TABLET | Refills: 0 | OUTPATIENT
Start: 2019-10-09

## 2019-10-09 RX ORDER — OXYBUTYNIN CHLORIDE 5 MG/1
TABLET ORAL
Qty: 270 TABLET | Refills: 0 | OUTPATIENT
Start: 2019-10-09

## 2019-10-21 RX ORDER — IBUPROFEN 800 MG/1
TABLET ORAL
Qty: 90 TABLET | Refills: 0 | Status: SHIPPED | OUTPATIENT
Start: 2019-10-21 | End: 2019-12-12 | Stop reason: SDUPTHER

## 2019-10-22 ENCOUNTER — OFFICE VISIT (OUTPATIENT)
Dept: INTERNAL MEDICINE | Facility: CLINIC | Age: 44
End: 2019-10-22
Payer: MEDICAID

## 2019-10-22 VITALS
HEART RATE: 96 BPM | HEIGHT: 61 IN | WEIGHT: 187.38 LBS | RESPIRATION RATE: 16 BRPM | TEMPERATURE: 98 F | SYSTOLIC BLOOD PRESSURE: 137 MMHG | DIASTOLIC BLOOD PRESSURE: 93 MMHG | BODY MASS INDEX: 35.38 KG/M2

## 2019-10-22 DIAGNOSIS — R30.0 DYSURIA: ICD-10-CM

## 2019-10-22 DIAGNOSIS — G43.109 MIGRAINE WITH AURA AND WITHOUT STATUS MIGRAINOSUS, NOT INTRACTABLE: Primary | ICD-10-CM

## 2019-10-22 DIAGNOSIS — I10 ESSENTIAL HYPERTENSION: ICD-10-CM

## 2019-10-22 DIAGNOSIS — M54.42 LOW BACK PAIN WITH LEFT-SIDED SCIATICA, UNSPECIFIED BACK PAIN LATERALITY, UNSPECIFIED CHRONICITY: ICD-10-CM

## 2019-10-22 DIAGNOSIS — M54.2 CERVICALGIA: ICD-10-CM

## 2019-10-22 PROCEDURE — 99999 PR PBB SHADOW E&M-EST. PATIENT-LVL IV: ICD-10-PCS | Mod: PBBFAC,,, | Performed by: INTERNAL MEDICINE

## 2019-10-22 PROCEDURE — 99214 OFFICE O/P EST MOD 30 MIN: CPT | Mod: S$PBB,,, | Performed by: INTERNAL MEDICINE

## 2019-10-22 PROCEDURE — 99999 PR PBB SHADOW E&M-EST. PATIENT-LVL IV: CPT | Mod: PBBFAC,,, | Performed by: INTERNAL MEDICINE

## 2019-10-22 PROCEDURE — 99214 PR OFFICE/OUTPT VISIT, EST, LEVL IV, 30-39 MIN: ICD-10-PCS | Mod: S$PBB,,, | Performed by: INTERNAL MEDICINE

## 2019-10-22 PROCEDURE — 90686 IIV4 VACC NO PRSV 0.5 ML IM: CPT | Mod: PBBFAC,PO

## 2019-10-22 PROCEDURE — 90714 TD VACC NO PRESV 7 YRS+ IM: CPT | Mod: PBBFAC,PO

## 2019-10-22 PROCEDURE — 99214 OFFICE O/P EST MOD 30 MIN: CPT | Mod: PBBFAC,PO,25 | Performed by: INTERNAL MEDICINE

## 2019-10-22 RX ORDER — BUTALBITAL, ACETAMINOPHEN AND CAFFEINE 50; 325; 40 MG/1; MG/1; MG/1
1 TABLET ORAL EVERY 6 HOURS PRN
Qty: 90 TABLET | Refills: 0 | Status: SHIPPED | OUTPATIENT
Start: 2019-10-22 | End: 2019-12-12 | Stop reason: SDUPTHER

## 2019-10-22 RX ORDER — SULFAMETHOXAZOLE AND TRIMETHOPRIM 800; 160 MG/1; MG/1
1 TABLET ORAL 2 TIMES DAILY
Qty: 10 TABLET | Refills: 0 | Status: SHIPPED | OUTPATIENT
Start: 2019-10-22 | End: 2019-10-27

## 2019-10-22 RX ORDER — PHENAZOPYRIDINE HYDROCHLORIDE 100 MG/1
100 TABLET, FILM COATED ORAL 3 TIMES DAILY PRN
Qty: 9 TABLET | Refills: 0 | Status: SHIPPED | OUTPATIENT
Start: 2019-10-22 | End: 2019-11-01

## 2019-10-22 RX ORDER — OXYCODONE AND ACETAMINOPHEN 10; 325 MG/1; MG/1
1 TABLET ORAL EVERY 6 HOURS PRN
Qty: 120 TABLET | Refills: 0 | Status: SHIPPED | OUTPATIENT
Start: 2019-10-22 | End: 2019-11-19 | Stop reason: SDUPTHER

## 2019-10-22 RX ORDER — SUMATRIPTAN SUCCINATE 100 MG/1
100 TABLET ORAL
Qty: 20 TABLET | Refills: 3 | Status: SHIPPED | OUTPATIENT
Start: 2019-10-22 | End: 2020-06-30 | Stop reason: SDUPTHER

## 2019-10-22 NOTE — PROGRESS NOTES
CC: followup of hypertension and hyperlipidemia  HPI:  The patient is a 44 y.o. year old female who presents to the office for followup of hypertension and hyperlipidemia.  The patient denies any chest pain, shortness of breath, blurred vision, excessive fatigue.  She has been depressed after her friend committed suicide at her home.  She complains of headaches, reflux, nausea and vomiting.  She stepped on a nail today.  The patient complains of continued neck and back pain.  She also reports dysuria and urinary frequency.    PAST MEDICAL HISTORY:  Past Medical History:   Diagnosis Date    ADHD     Allergy     Asthma     childhood    Bipolar disorder     Gestational diabetes mellitus 2001    resolved    Hepatitis C     HTN (hypertension)     Hyperlipidemia     Interstitial cystitis     Migraine headache     OCD (obsessive compulsive disorder)        SURGICAL HISTORY:  Past Surgical History:   Procedure Laterality Date    ADENOIDECTOMY      bladder lift      CHOLECYSTECTOMY      HYSTERECTOMY      unilateral oophrectomy    OVARIAN CYST REMOVAL      SINUS SURGERY      TONSILLECTOMY         MEDS:  Medcard reviewed and updated    ALLERGIES: Allergy Card reviewed and updated    SOCIAL HISTORY:   The patient is a nonsmoker.    PE:   APPEARANCE: Well nourished, well developed, in no acute distress.    CHEST: Lungs clear to auscultation with unlabored respirations.  CARDIOVASCULAR: Normal S1, S2. No murmurs. No carotid bruits. No pedal edema.  ABDOMEN: Bowel sounds normal. Not distended. Soft. No tenderness or masses.   PSYCHIATRIC: The patient is oriented to person, place, and time and has a pleasant affect.        ASSESSMENT/PLAN:  Esther was seen today for follow-up.    Diagnoses and all orders for this visit:    Migraine with aura and without status migrainosus, not intractable  -     refill Percocet    Essential hypertension  -      blood pressure is mildly elevated    Low back pain with left-sided  sciatica, unspecified back pain laterality, unspecified chronicity  -     oxyCODONE-acetaminophen (PERCOCET)  mg per tablet; Take 1 tablet by mouth every 6 (six) hours as needed for Pain.    Cervicalgia  -     oxyCODONE-acetaminophen (PERCOCET)  mg per tablet; Take 1 tablet by mouth every 6 (six) hours as needed for Pain.    Dysuria  -     prescribed Bactrim and Pyridium    Other orders  -     Influenza - Quadrivalent (PF)  -     (In Office Administered) Td Vaccine - Preservative Free  -     butalbital-acetaminophen-caffeine -40 mg (FIORICET, ESGIC) -40 mg per tablet; Take 1 tablet by mouth every 6 (six) hours as needed.  -     sumatriptan (IMITREX) 100 MG tablet; Take 1 tablet (100 mg total) by mouth every 2 (two) hours as needed for Migraine (Maximum of 2 doses in a 24 hour period.).  -     sulfamethoxazole-trimethoprim 800-160mg (BACTRIM DS) 800-160 mg Tab; Take 1 tablet by mouth 2 (two) times daily. for 5 days  -     phenazopyridine (PYRIDIUM) 100 MG tablet; Take 1 tablet (100 mg total) by mouth 3 (three) times daily as needed for Pain.

## 2019-10-25 RX ORDER — FLUCONAZOLE 150 MG/1
TABLET ORAL
Qty: 2 TABLET | Refills: 0 | Status: SHIPPED | OUTPATIENT
Start: 2019-10-25 | End: 2023-03-02 | Stop reason: SDUPTHER

## 2019-10-25 RX ORDER — OXYBUTYNIN CHLORIDE 5 MG/1
5 TABLET ORAL 3 TIMES DAILY
Qty: 270 TABLET | Refills: 0 | Status: SHIPPED | OUTPATIENT
Start: 2019-10-25 | End: 2020-02-14

## 2019-10-31 ENCOUNTER — PATIENT OUTREACH (OUTPATIENT)
Dept: ADMINISTRATIVE | Facility: OTHER | Age: 44
End: 2019-10-31

## 2019-11-01 ENCOUNTER — PATIENT MESSAGE (OUTPATIENT)
Dept: INTERNAL MEDICINE | Facility: CLINIC | Age: 44
End: 2019-11-01

## 2019-11-01 ENCOUNTER — PATIENT MESSAGE (OUTPATIENT)
Dept: HEPATOLOGY | Facility: CLINIC | Age: 44
End: 2019-11-01

## 2019-11-01 ENCOUNTER — TELEPHONE (OUTPATIENT)
Dept: INTERNAL MEDICINE | Facility: CLINIC | Age: 44
End: 2019-11-01

## 2019-11-01 DIAGNOSIS — Z00.00 ROUTINE GENERAL MEDICAL EXAMINATION AT A HEALTH CARE FACILITY: Primary | ICD-10-CM

## 2019-11-01 RX ORDER — CLONIDINE HYDROCHLORIDE 0.2 MG/1
TABLET ORAL
Qty: 30 TABLET | Refills: 0 | Status: SHIPPED | OUTPATIENT
Start: 2019-11-01 | End: 2020-02-03

## 2019-11-04 ENCOUNTER — EPISODE CHANGES (OUTPATIENT)
Dept: HEPATOLOGY | Facility: CLINIC | Age: 44
End: 2019-11-04

## 2019-11-04 RX ORDER — ESTRADIOL 2 MG/1
TABLET ORAL
Qty: 90 TABLET | Refills: 3 | Status: SHIPPED | OUTPATIENT
Start: 2019-11-04 | End: 2020-04-13 | Stop reason: SDUPTHER

## 2019-11-04 RX ORDER — LAMOTRIGINE 200 MG/1
200 TABLET ORAL 2 TIMES DAILY
Qty: 180 TABLET | Refills: 3 | Status: SHIPPED | OUTPATIENT
Start: 2019-11-04 | End: 2020-01-16 | Stop reason: SDUPTHER

## 2019-11-04 RX ORDER — LURASIDONE HYDROCHLORIDE 80 MG/1
80 TABLET, FILM COATED ORAL DAILY
Qty: 30 TABLET | Refills: 3 | Status: SHIPPED | OUTPATIENT
Start: 2019-11-04 | End: 2020-03-11

## 2019-11-12 ENCOUNTER — PATIENT OUTREACH (OUTPATIENT)
Dept: ADMINISTRATIVE | Facility: OTHER | Age: 44
End: 2019-11-12

## 2019-11-15 ENCOUNTER — PATIENT MESSAGE (OUTPATIENT)
Dept: INTERNAL MEDICINE | Facility: CLINIC | Age: 44
End: 2019-11-15

## 2019-11-15 RX ORDER — PROPRANOLOL HYDROCHLORIDE 20 MG/1
20 TABLET ORAL 3 TIMES DAILY
Qty: 90 TABLET | Refills: 0 | Status: SHIPPED | OUTPATIENT
Start: 2019-11-15 | End: 2020-01-13

## 2019-11-18 ENCOUNTER — PATIENT MESSAGE (OUTPATIENT)
Dept: INTERNAL MEDICINE | Facility: CLINIC | Age: 44
End: 2019-11-18

## 2019-11-18 DIAGNOSIS — M54.42 LOW BACK PAIN WITH LEFT-SIDED SCIATICA, UNSPECIFIED BACK PAIN LATERALITY, UNSPECIFIED CHRONICITY: ICD-10-CM

## 2019-11-18 DIAGNOSIS — M54.2 CERVICALGIA: ICD-10-CM

## 2019-11-18 RX ORDER — PROMETHAZINE HYDROCHLORIDE 25 MG/1
TABLET ORAL
Qty: 30 TABLET | Refills: 0 | Status: SHIPPED | OUTPATIENT
Start: 2019-11-18 | End: 2020-06-30 | Stop reason: SDUPTHER

## 2019-11-18 RX ORDER — OXYCODONE AND ACETAMINOPHEN 10; 325 MG/1; MG/1
1 TABLET ORAL EVERY 6 HOURS PRN
Qty: 120 TABLET | Refills: 0 | Status: CANCELLED | OUTPATIENT
Start: 2019-11-18

## 2019-11-20 RX ORDER — FLUOXETINE HYDROCHLORIDE 20 MG/1
CAPSULE ORAL
Qty: 30 CAPSULE | Refills: 0 | Status: SHIPPED | OUTPATIENT
Start: 2019-11-20 | End: 2019-12-17 | Stop reason: SDUPTHER

## 2019-11-20 RX ORDER — OXYCODONE AND ACETAMINOPHEN 10; 325 MG/1; MG/1
1 TABLET ORAL EVERY 6 HOURS PRN
Qty: 120 TABLET | Refills: 0 | Status: SHIPPED | OUTPATIENT
Start: 2019-11-20 | End: 2019-12-16 | Stop reason: SDUPTHER

## 2019-11-25 RX ORDER — GABAPENTIN 600 MG/1
600 TABLET ORAL 3 TIMES DAILY
Qty: 90 TABLET | Refills: 3 | Status: SHIPPED | OUTPATIENT
Start: 2019-11-25 | End: 2020-01-16 | Stop reason: SDUPTHER

## 2019-12-13 RX ORDER — BUTALBITAL, ACETAMINOPHEN AND CAFFEINE 50; 325; 40 MG/1; MG/1; MG/1
TABLET ORAL
Qty: 90 TABLET | Refills: 0 | Status: SHIPPED | OUTPATIENT
Start: 2019-12-13 | End: 2020-01-13

## 2019-12-13 RX ORDER — IBUPROFEN 800 MG/1
TABLET ORAL
Qty: 90 TABLET | Refills: 0 | Status: SHIPPED | OUTPATIENT
Start: 2019-12-13 | End: 2020-01-09

## 2019-12-16 DIAGNOSIS — M54.2 CERVICALGIA: ICD-10-CM

## 2019-12-16 DIAGNOSIS — M54.42 LOW BACK PAIN WITH LEFT-SIDED SCIATICA, UNSPECIFIED BACK PAIN LATERALITY, UNSPECIFIED CHRONICITY: ICD-10-CM

## 2019-12-17 RX ORDER — OXYCODONE AND ACETAMINOPHEN 10; 325 MG/1; MG/1
1 TABLET ORAL EVERY 6 HOURS PRN
Qty: 120 TABLET | Refills: 0 | Status: SHIPPED | OUTPATIENT
Start: 2019-12-17 | End: 2023-03-02

## 2019-12-18 RX ORDER — OXYCODONE AND ACETAMINOPHEN 10; 325 MG/1; MG/1
1 TABLET ORAL EVERY 6 HOURS PRN
Qty: 120 TABLET | Refills: 0 | Status: SHIPPED | OUTPATIENT
Start: 2019-12-18 | End: 2020-01-15 | Stop reason: SDUPTHER

## 2019-12-18 RX ORDER — CLONAZEPAM 1 MG/1
TABLET ORAL
Qty: 90 TABLET | Refills: 0 | Status: SHIPPED | OUTPATIENT
Start: 2019-12-18 | End: 2020-01-15

## 2019-12-18 RX ORDER — CLONAZEPAM 2 MG/1
TABLET ORAL
Qty: 30 TABLET | Refills: 0 | Status: SHIPPED | OUTPATIENT
Start: 2019-12-18 | End: 2019-12-24 | Stop reason: SDUPTHER

## 2019-12-18 RX ORDER — FLUOXETINE HYDROCHLORIDE 20 MG/1
CAPSULE ORAL
Qty: 30 CAPSULE | Refills: 3 | Status: SHIPPED | OUTPATIENT
Start: 2019-12-18 | End: 2020-04-15

## 2019-12-26 RX ORDER — CLONAZEPAM 2 MG/1
TABLET ORAL
Qty: 30 TABLET | Refills: 0 | Status: SHIPPED | OUTPATIENT
Start: 2019-12-26 | End: 2020-01-15

## 2020-01-09 RX ORDER — HYDROXYZINE HYDROCHLORIDE 25 MG/1
TABLET, FILM COATED ORAL
Qty: 60 TABLET | Refills: 0 | Status: SHIPPED | OUTPATIENT
Start: 2020-01-09 | End: 2020-08-18

## 2020-01-09 RX ORDER — ALBUTEROL SULFATE 90 UG/1
AEROSOL, METERED RESPIRATORY (INHALATION)
Qty: 18 G | Refills: 0 | Status: SHIPPED | OUTPATIENT
Start: 2020-01-09 | End: 2020-06-30 | Stop reason: SDUPTHER

## 2020-01-09 RX ORDER — IBUPROFEN 800 MG/1
TABLET ORAL
Qty: 90 TABLET | Refills: 0 | Status: SHIPPED | OUTPATIENT
Start: 2020-01-09 | End: 2020-02-05

## 2020-01-13 RX ORDER — TIZANIDINE 4 MG/1
TABLET ORAL
Qty: 90 TABLET | Refills: 3 | Status: SHIPPED | OUTPATIENT
Start: 2020-01-13 | End: 2020-09-04 | Stop reason: SDUPTHER

## 2020-01-13 RX ORDER — PROPRANOLOL HYDROCHLORIDE 20 MG/1
TABLET ORAL
Qty: 90 TABLET | Refills: 0 | Status: SHIPPED | OUTPATIENT
Start: 2020-01-13 | End: 2020-07-01 | Stop reason: SDUPTHER

## 2020-01-13 RX ORDER — BUTALBITAL, ACETAMINOPHEN AND CAFFEINE 50; 325; 40 MG/1; MG/1; MG/1
TABLET ORAL
Qty: 90 TABLET | Refills: 1 | Status: SHIPPED | OUTPATIENT
Start: 2020-01-13 | End: 2020-04-13 | Stop reason: SDUPTHER

## 2020-01-15 ENCOUNTER — TELEPHONE (OUTPATIENT)
Dept: INTERNAL MEDICINE | Facility: CLINIC | Age: 45
End: 2020-01-15

## 2020-01-15 DIAGNOSIS — M54.42 LOW BACK PAIN WITH LEFT-SIDED SCIATICA, UNSPECIFIED BACK PAIN LATERALITY, UNSPECIFIED CHRONICITY: ICD-10-CM

## 2020-01-15 DIAGNOSIS — M54.2 CERVICALGIA: ICD-10-CM

## 2020-01-15 RX ORDER — CLONAZEPAM 1 MG/1
TABLET ORAL
Qty: 90 TABLET | Refills: 1 | Status: SHIPPED | OUTPATIENT
Start: 2020-01-15 | End: 2020-03-11

## 2020-01-15 RX ORDER — OXYCODONE AND ACETAMINOPHEN 10; 325 MG/1; MG/1
1 TABLET ORAL EVERY 6 HOURS PRN
Qty: 120 TABLET | Refills: 0 | Status: SHIPPED | OUTPATIENT
Start: 2020-01-15 | End: 2023-03-02

## 2020-01-15 RX ORDER — CLONAZEPAM 2 MG/1
TABLET ORAL
Qty: 30 TABLET | Refills: 1 | Status: SHIPPED | OUTPATIENT
Start: 2020-01-15 | End: 2020-03-11

## 2020-01-16 ENCOUNTER — TELEPHONE (OUTPATIENT)
Dept: INTERNAL MEDICINE | Facility: CLINIC | Age: 45
End: 2020-01-16

## 2020-01-16 RX ORDER — GABAPENTIN 600 MG/1
600 TABLET ORAL 3 TIMES DAILY
Qty: 90 TABLET | Refills: 3 | Status: SHIPPED | OUTPATIENT
Start: 2020-01-16 | End: 2020-04-13 | Stop reason: SDUPTHER

## 2020-01-16 RX ORDER — LAMOTRIGINE 200 MG/1
200 TABLET ORAL 2 TIMES DAILY
Qty: 180 TABLET | Refills: 3 | Status: SHIPPED | OUTPATIENT
Start: 2020-01-16 | End: 2020-04-13 | Stop reason: SDUPTHER

## 2020-01-20 ENCOUNTER — TELEPHONE (OUTPATIENT)
Dept: INTERNAL MEDICINE | Facility: CLINIC | Age: 45
End: 2020-01-20

## 2020-01-20 RX ORDER — PANTOPRAZOLE SODIUM 20 MG/1
20 TABLET, DELAYED RELEASE ORAL DAILY
Qty: 30 TABLET | Refills: 11 | Status: SHIPPED | OUTPATIENT
Start: 2020-01-20 | End: 2023-03-02

## 2020-01-22 ENCOUNTER — PATIENT MESSAGE (OUTPATIENT)
Dept: INTERNAL MEDICINE | Facility: CLINIC | Age: 45
End: 2020-01-22

## 2020-01-22 ENCOUNTER — TELEPHONE (OUTPATIENT)
Dept: INTERNAL MEDICINE | Facility: CLINIC | Age: 45
End: 2020-01-22

## 2020-01-24 ENCOUNTER — EPISODE CHANGES (OUTPATIENT)
Dept: HEPATOLOGY | Facility: CLINIC | Age: 45
End: 2020-01-24

## 2020-01-28 ENCOUNTER — EPISODE CHANGES (OUTPATIENT)
Dept: HEPATOLOGY | Facility: CLINIC | Age: 45
End: 2020-01-28

## 2020-02-03 RX ORDER — CLONIDINE HYDROCHLORIDE 0.2 MG/1
TABLET ORAL
Qty: 30 TABLET | Refills: 3 | Status: SHIPPED | OUTPATIENT
Start: 2020-02-03 | End: 2020-04-13 | Stop reason: SDUPTHER

## 2020-02-05 RX ORDER — IBUPROFEN 800 MG/1
TABLET ORAL
Qty: 90 TABLET | Refills: 0 | Status: SHIPPED | OUTPATIENT
Start: 2020-02-05 | End: 2020-03-11

## 2020-02-07 ENCOUNTER — EPISODE CHANGES (OUTPATIENT)
Dept: HEPATOLOGY | Facility: CLINIC | Age: 45
End: 2020-02-07

## 2020-02-12 ENCOUNTER — TELEPHONE (OUTPATIENT)
Dept: INTERNAL MEDICINE | Facility: CLINIC | Age: 45
End: 2020-02-12

## 2020-02-12 ENCOUNTER — PATIENT MESSAGE (OUTPATIENT)
Dept: INTERNAL MEDICINE | Facility: CLINIC | Age: 45
End: 2020-02-12

## 2020-02-14 RX ORDER — OXYBUTYNIN CHLORIDE 5 MG/1
TABLET ORAL
Qty: 270 TABLET | Refills: 0 | Status: SHIPPED | OUTPATIENT
Start: 2020-02-14 | End: 2020-04-13 | Stop reason: SDUPTHER

## 2020-02-14 RX ORDER — ZOLPIDEM TARTRATE 10 MG/1
TABLET ORAL
Qty: 30 TABLET | Refills: 3 | Status: SHIPPED | OUTPATIENT
Start: 2020-02-14 | End: 2020-04-13 | Stop reason: SDUPTHER

## 2020-03-10 ENCOUNTER — EPISODE CHANGES (OUTPATIENT)
Dept: HEPATOLOGY | Facility: CLINIC | Age: 45
End: 2020-03-10

## 2020-03-11 RX ORDER — LURASIDONE HYDROCHLORIDE 80 MG/1
TABLET, FILM COATED ORAL
Qty: 30 TABLET | Refills: 3 | Status: SHIPPED | OUTPATIENT
Start: 2020-03-11 | End: 2020-04-13 | Stop reason: SDUPTHER

## 2020-03-11 RX ORDER — IBUPROFEN 800 MG/1
TABLET ORAL
Qty: 90 TABLET | Refills: 0 | Status: SHIPPED | OUTPATIENT
Start: 2020-03-11 | End: 2020-04-14

## 2020-03-11 RX ORDER — CLONAZEPAM 2 MG/1
TABLET ORAL
Qty: 30 TABLET | Refills: 3 | Status: SHIPPED | OUTPATIENT
Start: 2020-03-11 | End: 2021-09-04 | Stop reason: SDUPTHER

## 2020-03-11 RX ORDER — CLONAZEPAM 1 MG/1
TABLET ORAL
Qty: 90 TABLET | Refills: 0 | Status: SHIPPED | OUTPATIENT
Start: 2020-03-11 | End: 2020-04-13 | Stop reason: SDUPTHER

## 2020-03-27 ENCOUNTER — EPISODE CHANGES (OUTPATIENT)
Dept: HEPATOLOGY | Facility: CLINIC | Age: 45
End: 2020-03-27

## 2020-04-13 ENCOUNTER — PATIENT MESSAGE (OUTPATIENT)
Dept: INTERNAL MEDICINE | Facility: CLINIC | Age: 45
End: 2020-04-13

## 2020-04-14 RX ORDER — OXYBUTYNIN CHLORIDE 5 MG/1
5 TABLET ORAL 3 TIMES DAILY
Qty: 270 TABLET | Refills: 3 | Status: SHIPPED | OUTPATIENT
Start: 2020-04-14 | End: 2020-09-04 | Stop reason: SDUPTHER

## 2020-04-14 RX ORDER — CLONIDINE HYDROCHLORIDE 0.2 MG/1
0.2 TABLET ORAL DAILY
Qty: 30 TABLET | Refills: 3 | Status: SHIPPED | OUTPATIENT
Start: 2020-04-14 | End: 2020-06-12

## 2020-04-14 RX ORDER — LURASIDONE HYDROCHLORIDE 80 MG/1
80 TABLET, FILM COATED ORAL DAILY
Qty: 30 TABLET | Refills: 3 | Status: SHIPPED | OUTPATIENT
Start: 2020-04-14 | End: 2020-07-06

## 2020-04-14 RX ORDER — ESTRADIOL 2 MG/1
TABLET ORAL
Qty: 90 TABLET | Refills: 3 | Status: SHIPPED | OUTPATIENT
Start: 2020-04-14 | End: 2020-11-24 | Stop reason: SDUPTHER

## 2020-04-14 RX ORDER — BUTALBITAL, ACETAMINOPHEN AND CAFFEINE 50; 325; 40 MG/1; MG/1; MG/1
1 TABLET ORAL EVERY 6 HOURS PRN
Qty: 90 TABLET | Refills: 1 | Status: SHIPPED | OUTPATIENT
Start: 2020-04-14 | End: 2020-06-30 | Stop reason: SDUPTHER

## 2020-04-14 RX ORDER — ZOLPIDEM TARTRATE 10 MG/1
10 TABLET ORAL NIGHTLY PRN
Qty: 30 TABLET | Refills: 3 | Status: SHIPPED | OUTPATIENT
Start: 2020-04-14 | End: 2021-09-16

## 2020-04-14 RX ORDER — LAMOTRIGINE 200 MG/1
200 TABLET ORAL 2 TIMES DAILY
Qty: 180 TABLET | Refills: 3 | Status: SHIPPED | OUTPATIENT
Start: 2020-04-14 | End: 2021-01-19

## 2020-04-14 RX ORDER — SULFAMETHOXAZOLE AND TRIMETHOPRIM 800; 160 MG/1; MG/1
1 TABLET ORAL 2 TIMES DAILY
Qty: 14 TABLET | Refills: 0 | Status: SHIPPED | OUTPATIENT
Start: 2020-04-14 | End: 2020-04-21

## 2020-04-14 RX ORDER — CLONIDINE HYDROCHLORIDE 0.2 MG/1
0.2 TABLET ORAL DAILY
Qty: 30 TABLET | Refills: 3 | Status: SHIPPED | OUTPATIENT
Start: 2020-04-14 | End: 2023-03-02

## 2020-04-14 RX ORDER — SULFAMETHOXAZOLE AND TRIMETHOPRIM 800; 160 MG/1; MG/1
TABLET ORAL
Qty: 10 TABLET | Refills: 0 | Status: SHIPPED | OUTPATIENT
Start: 2020-04-14 | End: 2020-08-04 | Stop reason: SDUPTHER

## 2020-04-14 RX ORDER — FLUOXETINE HYDROCHLORIDE 20 MG/1
20 CAPSULE ORAL DAILY
Qty: 30 CAPSULE | Refills: 3 | Status: SHIPPED | OUTPATIENT
Start: 2020-04-14 | End: 2020-07-31

## 2020-04-14 RX ORDER — IBUPROFEN 800 MG/1
TABLET ORAL
Qty: 90 TABLET | Refills: 0 | Status: SHIPPED | OUTPATIENT
Start: 2020-04-14 | End: 2020-06-01

## 2020-04-14 RX ORDER — GABAPENTIN 600 MG/1
600 TABLET ORAL 3 TIMES DAILY
Qty: 90 TABLET | Refills: 3 | Status: SHIPPED | OUTPATIENT
Start: 2020-04-14 | End: 2020-06-11

## 2020-04-14 RX ORDER — CLONAZEPAM 1 MG/1
1 TABLET ORAL 3 TIMES DAILY
Qty: 90 TABLET | Refills: 0 | Status: SHIPPED | OUTPATIENT
Start: 2020-04-14 | End: 2020-05-29 | Stop reason: SDUPTHER

## 2020-04-15 RX ORDER — CLONAZEPAM 1 MG/1
TABLET ORAL
Qty: 90 TABLET | OUTPATIENT
Start: 2020-04-15

## 2020-04-15 RX ORDER — BUTALBITAL, ACETAMINOPHEN AND CAFFEINE 50; 325; 40 MG/1; MG/1; MG/1
TABLET ORAL
Qty: 90 TABLET | Refills: 1 | OUTPATIENT
Start: 2020-04-15

## 2020-04-15 RX ORDER — FLUOXETINE HYDROCHLORIDE 20 MG/1
CAPSULE ORAL
Qty: 30 CAPSULE | Refills: 3 | Status: SHIPPED | OUTPATIENT
Start: 2020-04-15

## 2020-04-17 ENCOUNTER — TELEPHONE (OUTPATIENT)
Dept: HEPATOLOGY | Facility: CLINIC | Age: 45
End: 2020-04-17

## 2020-04-17 NOTE — TELEPHONE ENCOUNTER
Spoke to pt  She completed only ~ 5 weeks of Mavyret in 2019 due to death in family. Has had no labs since then. She didn't think she had any other options.    pls contact her and schedule HCV RNA to be done locally (she has moved. Updated contact info is now in Epic).   If HCV pos we can ultimately schedule video visit to discuss retreatment.   If HCV neg she will not need additional f/u

## 2020-04-17 NOTE — TELEPHONE ENCOUNTER
----- Message from Sadia Carrera RN sent at 3/27/2020 12:46 PM CDT -----  Patient did not have svr 12 lab drawn on 10/29/20.  Attempts made to coordinate draw unsuccessful.  Okay to close episode?

## 2020-04-27 ENCOUNTER — TELEPHONE (OUTPATIENT)
Dept: INTERNAL MEDICINE | Facility: CLINIC | Age: 45
End: 2020-04-27

## 2020-04-27 NOTE — TELEPHONE ENCOUNTER
----- Message from Arlette Iniguez sent at 4/27/2020  2:43 PM CDT -----  Contact: Miladis with  [facility]'s  [phone]  Pharmacy is calling to clarify an RX.  RX name:  Clonazepam   What do they need to clarify:  Correct  mg on the Rx    Comments:  One Rx states 3 times a day  1mg and the 2 mg stated 1 time a day. Which is she on?    Fax  [phone]

## 2020-05-07 ENCOUNTER — EPISODE CHANGES (OUTPATIENT)
Dept: HEPATOLOGY | Facility: CLINIC | Age: 45
End: 2020-05-07

## 2020-05-26 ENCOUNTER — EPISODE CHANGES (OUTPATIENT)
Dept: HEPATOLOGY | Facility: CLINIC | Age: 45
End: 2020-05-26

## 2020-06-01 RX ORDER — CLONAZEPAM 1 MG/1
1 TABLET ORAL 3 TIMES DAILY
Qty: 90 TABLET | Refills: 0 | Status: SHIPPED | OUTPATIENT
Start: 2020-06-01 | End: 2020-06-30 | Stop reason: SDUPTHER

## 2020-06-01 RX ORDER — IBUPROFEN 800 MG/1
TABLET ORAL
Qty: 90 TABLET | Refills: 0 | Status: SHIPPED | OUTPATIENT
Start: 2020-06-01 | End: 2020-06-30 | Stop reason: SDUPTHER

## 2020-06-01 RX ORDER — FUROSEMIDE 20 MG/1
TABLET ORAL
Qty: 30 TABLET | Refills: 3 | Status: SHIPPED | OUTPATIENT
Start: 2020-06-01

## 2020-06-01 RX ORDER — CLONAZEPAM 1 MG/1
TABLET ORAL
Qty: 90 TABLET | OUTPATIENT
Start: 2020-06-01

## 2020-06-17 ENCOUNTER — EPISODE CHANGES (OUTPATIENT)
Dept: HEPATOLOGY | Facility: CLINIC | Age: 45
End: 2020-06-17

## 2020-07-03 RX ORDER — ALBUTEROL SULFATE 90 UG/1
2 AEROSOL, METERED RESPIRATORY (INHALATION) EVERY 6 HOURS PRN
Qty: 18 G | Refills: 0 | Status: SHIPPED | OUTPATIENT
Start: 2020-07-03 | End: 2023-03-02 | Stop reason: SDUPTHER

## 2020-07-03 RX ORDER — CLONAZEPAM 1 MG/1
1 TABLET ORAL 3 TIMES DAILY
Qty: 90 TABLET | Refills: 0 | Status: SHIPPED | OUTPATIENT
Start: 2020-07-03 | End: 2020-08-04 | Stop reason: SDUPTHER

## 2020-07-03 RX ORDER — IBUPROFEN 800 MG/1
800 TABLET ORAL 3 TIMES DAILY PRN
Qty: 90 TABLET | Refills: 0 | Status: SHIPPED | OUTPATIENT
Start: 2020-07-03 | End: 2023-03-02 | Stop reason: SDUPTHER

## 2020-07-03 RX ORDER — CLONAZEPAM 2 MG/1
TABLET ORAL
Qty: 30 TABLET | Refills: 3 | OUTPATIENT
Start: 2020-07-03

## 2020-07-03 RX ORDER — IBUPROFEN 800 MG/1
800 TABLET ORAL 3 TIMES DAILY PRN
Qty: 90 TABLET | Refills: 0 | Status: SHIPPED | OUTPATIENT
Start: 2020-07-03 | End: 2020-08-06

## 2020-07-03 RX ORDER — PROMETHAZINE HYDROCHLORIDE 25 MG/1
25 TABLET ORAL EVERY 6 HOURS PRN
Qty: 30 TABLET | Refills: 0 | Status: SHIPPED | OUTPATIENT
Start: 2020-07-03 | End: 2023-03-02 | Stop reason: SDUPTHER

## 2020-07-03 RX ORDER — SUMATRIPTAN SUCCINATE 100 MG/1
100 TABLET ORAL
Qty: 20 TABLET | Refills: 3 | Status: SHIPPED | OUTPATIENT
Start: 2020-07-03 | End: 2021-07-23 | Stop reason: SDUPTHER

## 2020-07-03 RX ORDER — PROMETHAZINE HYDROCHLORIDE 25 MG/1
25 TABLET ORAL EVERY 6 HOURS PRN
Qty: 30 TABLET | Refills: 3 | Status: SHIPPED | OUTPATIENT
Start: 2020-07-03 | End: 2021-07-23

## 2020-07-03 RX ORDER — ALBUTEROL SULFATE 90 UG/1
2 AEROSOL, METERED RESPIRATORY (INHALATION) EVERY 6 HOURS PRN
Qty: 18 G | Refills: 3 | Status: SHIPPED | OUTPATIENT
Start: 2020-07-03 | End: 2023-03-02 | Stop reason: SDUPTHER

## 2020-07-03 RX ORDER — SUMATRIPTAN SUCCINATE 100 MG/1
100 TABLET ORAL
Qty: 20 TABLET | Refills: 3 | Status: SHIPPED | OUTPATIENT
Start: 2020-07-03 | End: 2021-07-22 | Stop reason: SDUPTHER

## 2020-07-03 RX ORDER — CLONAZEPAM 1 MG/1
1 TABLET ORAL 3 TIMES DAILY
Qty: 90 TABLET | Refills: 0 | Status: SHIPPED | OUTPATIENT
Start: 2020-07-03 | End: 2023-03-02

## 2020-07-03 RX ORDER — PROPRANOLOL HYDROCHLORIDE 20 MG/1
20 TABLET ORAL 3 TIMES DAILY
Qty: 90 TABLET | Refills: 0 | Status: SHIPPED | OUTPATIENT
Start: 2020-07-03 | End: 2021-02-23 | Stop reason: SDUPTHER

## 2020-07-03 RX ORDER — BUTALBITAL, ACETAMINOPHEN AND CAFFEINE 50; 325; 40 MG/1; MG/1; MG/1
1 TABLET ORAL EVERY 6 HOURS PRN
Qty: 90 TABLET | Refills: 1 | Status: SHIPPED | OUTPATIENT
Start: 2020-07-03 | End: 2021-01-16

## 2020-07-06 RX ORDER — CLONAZEPAM 2 MG/1
TABLET ORAL
Qty: 30 TABLET | Refills: 3 | OUTPATIENT
Start: 2020-07-06

## 2020-08-04 ENCOUNTER — EPISODE CHANGES (OUTPATIENT)
Dept: HEPATOLOGY | Facility: CLINIC | Age: 45
End: 2020-08-04

## 2020-08-06 RX ORDER — SULFAMETHOXAZOLE AND TRIMETHOPRIM 800; 160 MG/1; MG/1
1 TABLET ORAL 2 TIMES DAILY
Qty: 10 TABLET | Refills: 0 | OUTPATIENT
Start: 2020-08-06

## 2020-08-06 RX ORDER — SULFAMETHOXAZOLE AND TRIMETHOPRIM 800; 160 MG/1; MG/1
1 TABLET ORAL 2 TIMES DAILY
Qty: 10 TABLET | Refills: 0 | Status: SHIPPED | OUTPATIENT
Start: 2020-08-06 | End: 2020-08-17

## 2020-08-06 RX ORDER — CLONAZEPAM 1 MG/1
1 TABLET ORAL 3 TIMES DAILY
Qty: 90 TABLET | Refills: 0 | Status: SHIPPED | OUTPATIENT
Start: 2020-08-06 | End: 2020-10-07 | Stop reason: SDUPTHER

## 2020-08-06 RX ORDER — CLONAZEPAM 1 MG/1
1 TABLET ORAL 3 TIMES DAILY
Qty: 90 TABLET | Refills: 0 | OUTPATIENT
Start: 2020-08-06

## 2020-08-11 ENCOUNTER — PATIENT OUTREACH (OUTPATIENT)
Dept: ADMINISTRATIVE | Facility: HOSPITAL | Age: 45
End: 2020-08-11

## 2020-09-04 RX ORDER — TIZANIDINE 4 MG/1
4 TABLET ORAL EVERY 8 HOURS PRN
Qty: 90 TABLET | Refills: 3 | Status: SHIPPED | OUTPATIENT
Start: 2020-09-04 | End: 2021-01-11

## 2020-09-04 RX ORDER — OXYBUTYNIN CHLORIDE 5 MG/1
5 TABLET ORAL 3 TIMES DAILY
Qty: 270 TABLET | Refills: 3 | Status: SHIPPED | OUTPATIENT
Start: 2020-09-04 | End: 2021-10-11 | Stop reason: SDUPTHER

## 2020-09-28 ENCOUNTER — TELEPHONE (OUTPATIENT)
Dept: INTERNAL MEDICINE | Facility: CLINIC | Age: 45
End: 2020-09-28

## 2020-10-08 ENCOUNTER — TELEPHONE (OUTPATIENT)
Dept: HEPATOLOGY | Facility: CLINIC | Age: 45
End: 2020-10-08

## 2020-10-08 DIAGNOSIS — B18.2 CHRONIC HEPATITIS C WITHOUT HEPATIC COMA: ICD-10-CM

## 2020-10-08 RX ORDER — CLONAZEPAM 1 MG/1
1 TABLET ORAL 3 TIMES DAILY
Qty: 90 TABLET | Refills: 0 | Status: SHIPPED | OUTPATIENT
Start: 2020-10-08 | End: 2023-03-02 | Stop reason: SDUPTHER

## 2020-10-08 RX ORDER — CLONAZEPAM 1 MG/1
TABLET ORAL
Qty: 90 TABLET | Refills: 3 | Status: SHIPPED | OUTPATIENT
Start: 2020-10-08 | End: 2021-04-08 | Stop reason: SDUPTHER

## 2020-10-08 NOTE — TELEPHONE ENCOUNTER
----- Message from Sadia Carrera RN sent at 9/3/2020  8:59 AM CDT -----  No advanced fibrosis.  Mavyret prescribed X 8 wks.  EOT 8/5/19.  Multiple attempts made to coordinate SVR 12 draw.  Attempt made to reach her today.  LVM

## 2020-10-08 NOTE — TELEPHONE ENCOUNTER
Letter sent  Episode closed    (prior PC revealed pt only took ~ 5 weeks of HCV rx due to death in family)

## 2020-10-15 RX ORDER — CLONIDINE HYDROCHLORIDE 0.2 MG/1
TABLET ORAL
Qty: 30 TABLET | Refills: 3 | Status: SHIPPED | OUTPATIENT
Start: 2020-10-15 | End: 2021-03-02 | Stop reason: SDUPTHER

## 2020-10-21 RX ORDER — GABAPENTIN 600 MG/1
TABLET ORAL
Qty: 90 TABLET | Refills: 3 | Status: SHIPPED | OUTPATIENT
Start: 2020-10-21 | End: 2021-03-02 | Stop reason: SDUPTHER

## 2020-11-11 RX ORDER — HYDROXYZINE HYDROCHLORIDE 25 MG/1
TABLET, FILM COATED ORAL
Qty: 60 TABLET | Refills: 0 | Status: SHIPPED | OUTPATIENT
Start: 2020-11-11 | End: 2021-01-16

## 2020-11-13 DIAGNOSIS — Z12.31 OTHER SCREENING MAMMOGRAM: ICD-10-CM

## 2020-11-18 RX ORDER — LURASIDONE HYDROCHLORIDE 80 MG/1
80 TABLET, FILM COATED ORAL DAILY
Qty: 30 TABLET | Refills: 3 | Status: SHIPPED | OUTPATIENT
Start: 2020-11-18 | End: 2020-12-17 | Stop reason: SDUPTHER

## 2020-11-24 ENCOUNTER — PATIENT MESSAGE (OUTPATIENT)
Dept: ADMINISTRATIVE | Facility: HOSPITAL | Age: 45
End: 2020-11-24

## 2020-11-24 ENCOUNTER — PATIENT OUTREACH (OUTPATIENT)
Dept: ADMINISTRATIVE | Facility: HOSPITAL | Age: 45
End: 2020-11-24

## 2020-11-25 ENCOUNTER — TELEPHONE (OUTPATIENT)
Dept: INTERNAL MEDICINE | Facility: CLINIC | Age: 45
End: 2020-11-25

## 2020-11-25 RX ORDER — ESTRADIOL 2 MG/1
TABLET ORAL
Qty: 90 TABLET | Refills: 3 | Status: SHIPPED | OUTPATIENT
Start: 2020-11-25 | End: 2023-03-02 | Stop reason: SDUPTHER

## 2020-11-25 RX ORDER — OMEPRAZOLE 40 MG/1
40 CAPSULE, DELAYED RELEASE ORAL EVERY MORNING
Qty: 90 CAPSULE | Refills: 0 | Status: SHIPPED | OUTPATIENT
Start: 2020-11-25 | End: 2021-04-08 | Stop reason: SDUPTHER

## 2020-12-11 ENCOUNTER — PATIENT MESSAGE (OUTPATIENT)
Dept: OTHER | Facility: OTHER | Age: 45
End: 2020-12-11

## 2020-12-12 ENCOUNTER — PATIENT MESSAGE (OUTPATIENT)
Dept: ADMINISTRATIVE | Facility: OTHER | Age: 45
End: 2020-12-12

## 2020-12-17 ENCOUNTER — TELEPHONE (OUTPATIENT)
Dept: PHARMACY | Facility: CLINIC | Age: 45
End: 2020-12-17

## 2020-12-17 RX ORDER — LURASIDONE HYDROCHLORIDE 80 MG/1
80 TABLET, FILM COATED ORAL DAILY
Qty: 30 TABLET | Refills: 3 | Status: SHIPPED | OUTPATIENT
Start: 2020-12-17 | End: 2023-03-02

## 2020-12-29 RX ORDER — FLUOXETINE HYDROCHLORIDE 20 MG/1
20 CAPSULE ORAL DAILY
Qty: 30 CAPSULE | Refills: 3 | Status: SHIPPED | OUTPATIENT
Start: 2020-12-29 | End: 2021-06-03

## 2021-01-04 ENCOUNTER — PATIENT MESSAGE (OUTPATIENT)
Dept: ADMINISTRATIVE | Facility: HOSPITAL | Age: 46
End: 2021-01-04

## 2021-03-30 RX ORDER — ALBUTEROL SULFATE 90 UG/1
AEROSOL, METERED RESPIRATORY (INHALATION)
Qty: 8.5 G | Refills: 1 | Status: SHIPPED | OUTPATIENT
Start: 2021-03-30

## 2021-04-05 ENCOUNTER — PATIENT MESSAGE (OUTPATIENT)
Dept: ADMINISTRATIVE | Facility: HOSPITAL | Age: 46
End: 2021-04-05

## 2021-04-06 ENCOUNTER — TELEPHONE (OUTPATIENT)
Dept: INTERNAL MEDICINE | Facility: CLINIC | Age: 46
End: 2021-04-06
Payer: MEDICAID

## 2021-04-08 RX ORDER — OMEPRAZOLE 40 MG/1
40 CAPSULE, DELAYED RELEASE ORAL EVERY MORNING
Qty: 90 CAPSULE | Refills: 0 | Status: SHIPPED | OUTPATIENT
Start: 2021-04-08 | End: 2021-11-11

## 2021-04-08 RX ORDER — IBUPROFEN 800 MG/1
TABLET ORAL
Qty: 90 TABLET | Refills: 0 | Status: SHIPPED | OUTPATIENT
Start: 2021-04-08 | End: 2021-05-06

## 2021-04-08 RX ORDER — CLONAZEPAM 1 MG/1
TABLET ORAL
Qty: 90 TABLET | Refills: 3 | Status: SHIPPED | OUTPATIENT
Start: 2021-04-08 | End: 2023-03-02 | Stop reason: SDUPTHER

## 2021-07-06 ENCOUNTER — PATIENT MESSAGE (OUTPATIENT)
Dept: ADMINISTRATIVE | Facility: HOSPITAL | Age: 46
End: 2021-07-06

## 2021-07-23 RX ORDER — BUTALBITAL, ACETAMINOPHEN AND CAFFEINE 50; 325; 40 MG/1; MG/1; MG/1
TABLET ORAL
Qty: 90 TABLET | Refills: 1 | Status: SHIPPED | OUTPATIENT
Start: 2021-07-23 | End: 2021-12-02

## 2021-07-23 RX ORDER — BUTALBITAL, ACETAMINOPHEN AND CAFFEINE 50; 325; 40 MG/1; MG/1; MG/1
1 TABLET ORAL EVERY 6 HOURS PRN
Qty: 90 TABLET | Refills: 1 | Status: SHIPPED | OUTPATIENT
Start: 2021-07-23 | End: 2023-03-02

## 2021-07-23 RX ORDER — SUMATRIPTAN SUCCINATE 100 MG/1
100 TABLET ORAL
Qty: 20 TABLET | Refills: 3 | Status: SHIPPED | OUTPATIENT
Start: 2021-07-23 | End: 2023-03-02 | Stop reason: SDUPTHER

## 2021-07-23 RX ORDER — PROMETHAZINE HYDROCHLORIDE 25 MG/1
TABLET ORAL
Qty: 30 TABLET | Refills: 3 | Status: SHIPPED | OUTPATIENT
Start: 2021-07-23

## 2021-07-23 RX ORDER — SUMATRIPTAN SUCCINATE 100 MG/1
100 TABLET ORAL
Qty: 20 TABLET | Refills: 3 | Status: SHIPPED | OUTPATIENT
Start: 2021-07-23

## 2021-09-07 RX ORDER — CLONIDINE HYDROCHLORIDE 0.2 MG/1
TABLET ORAL
Qty: 30 TABLET | Refills: 3 | Status: SHIPPED | OUTPATIENT
Start: 2021-09-07 | End: 2022-02-04 | Stop reason: SDUPTHER

## 2021-09-07 RX ORDER — CLONAZEPAM 2 MG/1
TABLET ORAL
Qty: 30 TABLET | Refills: 3 | Status: SHIPPED | OUTPATIENT
Start: 2021-09-07 | End: 2022-02-25 | Stop reason: SDUPTHER

## 2021-09-28 RX ORDER — CLONAZEPAM 1 MG/1
TABLET ORAL
Qty: 90 TABLET | Refills: 3 | OUTPATIENT
Start: 2021-09-28

## 2021-10-04 ENCOUNTER — PATIENT MESSAGE (OUTPATIENT)
Dept: ADMINISTRATIVE | Facility: HOSPITAL | Age: 46
End: 2021-10-04

## 2021-10-28 ENCOUNTER — TELEPHONE (OUTPATIENT)
Dept: INTERNAL MEDICINE | Facility: CLINIC | Age: 46
End: 2021-10-28
Payer: MEDICAID

## 2021-10-28 DIAGNOSIS — Z12.31 VISIT FOR SCREENING MAMMOGRAM: ICD-10-CM

## 2021-10-28 DIAGNOSIS — Z00.00 ROUTINE MEDICAL EXAM: Primary | ICD-10-CM

## 2021-12-28 RX ORDER — ESTRADIOL 2 MG/1
TABLET ORAL
Qty: 90 TABLET | Refills: 0 | Status: SHIPPED | OUTPATIENT
Start: 2021-12-28 | End: 2022-03-07

## 2021-12-28 RX ORDER — IBUPROFEN 800 MG/1
TABLET ORAL
Qty: 90 TABLET | Refills: 0 | Status: SHIPPED | OUTPATIENT
Start: 2021-12-28 | End: 2022-01-26

## 2022-01-03 RX ORDER — OMEPRAZOLE 40 MG/1
40 CAPSULE, DELAYED RELEASE ORAL EVERY MORNING
Qty: 90 CAPSULE | Refills: 0 | OUTPATIENT
Start: 2022-01-03

## 2022-01-03 RX ORDER — CLONAZEPAM 2 MG/1
TABLET ORAL
Qty: 30 TABLET | Refills: 3 | OUTPATIENT
Start: 2022-01-03

## 2022-01-03 RX ORDER — CLONIDINE HYDROCHLORIDE 0.2 MG/1
TABLET ORAL
Qty: 30 TABLET | Refills: 3 | OUTPATIENT
Start: 2022-01-03

## 2022-01-03 RX ORDER — ZOLPIDEM TARTRATE 10 MG/1
10 TABLET ORAL NIGHTLY PRN
Qty: 30 TABLET | Refills: 0 | OUTPATIENT
Start: 2022-01-03

## 2022-01-18 ENCOUNTER — PATIENT MESSAGE (OUTPATIENT)
Dept: ADMINISTRATIVE | Facility: HOSPITAL | Age: 47
End: 2022-01-18
Payer: MEDICAID

## 2022-02-04 ENCOUNTER — TELEPHONE (OUTPATIENT)
Dept: INTERNAL MEDICINE | Facility: CLINIC | Age: 47
End: 2022-02-04
Payer: MEDICAID

## 2022-02-04 NOTE — TELEPHONE ENCOUNTER
Appointment Request From: Esther Gage      With Provider: Rama Lee MD [[facility]]      Preferred Date Range: 2/10/2022 - 4/29/2022      Preferred Times: Monday Afternoon, Tuesday Afternoon, Wednesday Afternoon, Thursday Afternoon, Friday Afternoon      Reason for visit: Office Visit      Comments:   Reschedule of checkup from when Dr Lee had to be out for last scheduled appt.

## 2022-02-06 RX ORDER — CLONIDINE HYDROCHLORIDE 0.2 MG/1
TABLET ORAL
Qty: 30 TABLET | Refills: 3 | Status: SHIPPED | OUTPATIENT
Start: 2022-02-06 | End: 2022-07-26 | Stop reason: SDUPTHER

## 2022-02-06 RX ORDER — OMEPRAZOLE 40 MG/1
40 CAPSULE, DELAYED RELEASE ORAL EVERY MORNING
Qty: 90 CAPSULE | Refills: 0 | Status: SHIPPED | OUTPATIENT
Start: 2022-02-06 | End: 2022-02-25 | Stop reason: SDUPTHER

## 2022-02-06 RX ORDER — GABAPENTIN 600 MG/1
600 TABLET ORAL 3 TIMES DAILY
Qty: 90 TABLET | Refills: 3 | Status: SHIPPED | OUTPATIENT
Start: 2022-02-06 | End: 2022-02-27 | Stop reason: SDUPTHER

## 2022-02-27 RX ORDER — OMEPRAZOLE 40 MG/1
40 CAPSULE, DELAYED RELEASE ORAL EVERY MORNING
Qty: 90 CAPSULE | Refills: 0 | Status: SHIPPED | OUTPATIENT
Start: 2022-02-27 | End: 2022-03-03 | Stop reason: SDUPTHER

## 2022-02-27 RX ORDER — BUTALBITAL, ACETAMINOPHEN AND CAFFEINE 50; 325; 40 MG/1; MG/1; MG/1
1 TABLET ORAL EVERY 6 HOURS PRN
Qty: 90 TABLET | Refills: 1 | Status: SHIPPED | OUTPATIENT
Start: 2022-02-27 | End: 2022-04-13

## 2022-02-27 RX ORDER — CLONAZEPAM 2 MG/1
TABLET ORAL
Qty: 30 TABLET | Refills: 3 | Status: SHIPPED | OUTPATIENT
Start: 2022-02-27

## 2022-02-27 RX ORDER — GABAPENTIN 600 MG/1
600 TABLET ORAL 3 TIMES DAILY
Qty: 90 TABLET | Refills: 3 | Status: SHIPPED | OUTPATIENT
Start: 2022-02-27 | End: 2023-03-02 | Stop reason: SDUPTHER

## 2022-03-03 RX ORDER — OMEPRAZOLE 40 MG/1
40 CAPSULE, DELAYED RELEASE ORAL EVERY MORNING
Qty: 90 CAPSULE | Refills: 0 | Status: SHIPPED | OUTPATIENT
Start: 2022-03-03

## 2022-03-07 ENCOUNTER — TELEPHONE (OUTPATIENT)
Dept: PHARMACY | Facility: CLINIC | Age: 47
End: 2022-03-07
Payer: MEDICAID

## 2022-03-07 DIAGNOSIS — M54.42 LOW BACK PAIN WITH LEFT-SIDED SCIATICA, UNSPECIFIED BACK PAIN LATERALITY, UNSPECIFIED CHRONICITY: ICD-10-CM

## 2022-03-07 DIAGNOSIS — M54.2 CERVICALGIA: ICD-10-CM

## 2022-04-07 ENCOUNTER — TELEPHONE (OUTPATIENT)
Dept: INTERNAL MEDICINE | Facility: CLINIC | Age: 47
End: 2022-04-07
Payer: MEDICAID

## 2022-04-07 NOTE — TELEPHONE ENCOUNTER
With Provider: Rama Lee MD [[facility]]      Preferred Date Range: 6/6/2022 - 8/5/2022      Preferred Times: Any Time      Reason for visit: Office Visit      Comments:    Check up. Can you please try and find a time I can do labs and mammogram the same day before or after appt due to travel time? THANKS!

## 2022-08-31 RX ORDER — OXYCODONE AND ACETAMINOPHEN 10; 325 MG/1; MG/1
1 TABLET ORAL EVERY 6 HOURS PRN
Qty: 120 TABLET | Refills: 0 | OUTPATIENT
Start: 2022-08-31

## 2023-01-04 ENCOUNTER — NURSE TRIAGE (OUTPATIENT)
Dept: ADMINISTRATIVE | Facility: CLINIC | Age: 48
End: 2023-01-04
Payer: MEDICAID

## 2023-01-04 NOTE — TELEPHONE ENCOUNTER
Pt c/o BS being elevated for past week. Stated that she felt disoriented earlier but feels better now.  now and was in 230's yesterday per pt. Pt concerned the A1C is still elevated and requesting appt for today. Unable to schedule appt today per care advice. Encounter routed to provider for f/u.       Reason for Disposition   Patient wants to be seen    Additional Information   Negative: Unconscious or difficult to awaken   Negative: Acting confused (e.g., disoriented, slurred speech)   Negative: Very weak (can't stand)   Negative: Sounds like a life-threatening emergency to the triager   Negative: Vomiting and signs of dehydration (e.g., very dry mouth, lightheaded, dark urine)   Negative: Blood glucose > 240 mg/dL (13.3 mmol/L) and rapid breathing   Negative: Blood glucose > 500 mg/dL (27.8 mmol/L)   Negative: Blood glucose > 240 mg/dL (13.3 mmol/L) AND urine ketones moderate-large (or more than 1+)   Negative: Blood glucose > 240 mg/dL (13.3 mmol/L) and blood ketones > 1.4 mmol/L   Negative: Blood glucose > 240 mg/dL (13.3 mmol/L) AND vomiting AND unable to check for ketones (in blood or urine)   Negative: Vomiting lasting > 4 hours   Negative: Patient sounds very sick or weak to the triager   Negative: Fever > 100.4 F (38.0 C)   Negative: Caller has URGENT medication or insulin pump question and triager unable to answer question   Negative: Blood glucose > 400 mg/dL (22.2 mmol/L)   Negative: Blood glucose > 300 mg/dL (16.7 mmol/L) AND two or more times in a row   Negative: Urine ketones moderate - large (or blood ketones > 1.4 mmol/L)   Negative: Symptoms of high blood sugar (e.g., frequent urination, weak, weight loss) and not able to test blood glucose   Negative: New-onset diabetes mellitus suspected (e.g., frequent urination, weak, weight loss)    Protocols used: Diabetes - High Blood Sugar-A-OH

## 2023-01-05 ENCOUNTER — TELEPHONE (OUTPATIENT)
Dept: INTERNAL MEDICINE | Facility: CLINIC | Age: 48
End: 2023-01-05

## 2023-01-05 NOTE — TELEPHONE ENCOUNTER
----- Message from Beverley Mena sent at 1/4/2023  4:02 PM CST -----  Contact: patient @ [phone]  No blue slot available to schedule an appointment for the patient.  Patient is established with which PCP: Dr Lee   Reason for the visit: ON CALL NURSE NOTES :  Pt c/o BS being elevated for past week. Stated that she felt disoriented earlier but feels better now.  now and was in 230's yesterday per pt. Pt concerned the A1C is still elevated and requesting appt for today. Unable to schedule appt today per care advice. Encounter routed to provider for f/u.     Would the patient like a call back, or a response through their [facility] portal?:CALL

## 2023-01-17 ENCOUNTER — TELEPHONE (OUTPATIENT)
Dept: FAMILY MEDICINE | Facility: CLINIC | Age: 48
End: 2023-01-17
Payer: MEDICAID

## 2023-01-17 NOTE — TELEPHONE ENCOUNTER
----- Message from Donnie Enamorado sent at 1/17/2023  2:16 PM CST -----  Contact: pt [phone]  No blue slot available to schedule an appointment for the patient.  Patient is established with which PCP: Alexy  Reason for the visit:est care   Would the patient like a call back, or a response through their [facility] portal?:  yes

## 2023-02-22 ENCOUNTER — TELEPHONE (OUTPATIENT)
Dept: HEPATOLOGY | Facility: CLINIC | Age: 48
End: 2023-02-22
Payer: MEDICAID

## 2023-02-22 NOTE — TELEPHONE ENCOUNTER
Patient last seen by LULA Humphreys in clinic on 3/22/19.  No advanced fibrosis.  Mavyret prescribed X 8 wks.  EOT 8/5/19.  Multiple attempts made to coordinate SVR 12 draw unsuccessful.  Patient lost to f/u.  Per prior PC note patient did not complete hep c treatment.  I spoke with patient and she reports having active hep c.  She reports being followed at the [facility] ([phone]).  I spoke with Nurse Daniela there and asked that she fax over clinicals for PA Scheuermann's review. Daniela states that she will speak with patient to get okay and then fax over clinicals.  Patient now living in [place] and being followed by Dr. Dickinson.  She will have his office to fax over recent CT of abdomen report (done at [facility]) for PA Scheuermann's review.  Appt with PA Scheuermann scheduled 3/27/23; appt reminder notice mailed.

## 2023-02-22 NOTE — TELEPHONE ENCOUNTER
----- Message from Caryl Dallas sent at 2/22/2023 11:51 AM CST -----    Patient Returning Call        Who Called:pt  Does the patient know what this is regarding?:ton an appt to be seen   Would the patient rather a call back or a response via [facility]? call  Best Call Back Number:[phone]  Additional Information: call back

## 2023-03-02 ENCOUNTER — OFFICE VISIT (OUTPATIENT)
Dept: HEPATOLOGY | Facility: CLINIC | Age: 48
End: 2023-03-02
Payer: MEDICAID

## 2023-03-02 DIAGNOSIS — B18.2 CHRONIC HEPATITIS C WITHOUT HEPATIC COMA: Primary | ICD-10-CM

## 2023-03-02 PROCEDURE — 99203 PR OFFICE/OUTPT VISIT, NEW, LEVL III, 30-44 MIN: ICD-10-PCS | Mod: 95,,, | Performed by: PHYSICIAN ASSISTANT

## 2023-03-02 PROCEDURE — 4010F PR ACE/ARB THEARPY RXD/TAKEN: ICD-10-PCS | Mod: CPTII,95,, | Performed by: PHYSICIAN ASSISTANT

## 2023-03-02 PROCEDURE — 1159F PR MEDICATION LIST DOCUMENTED IN MEDICAL RECORD: ICD-10-PCS | Mod: CPTII,95,, | Performed by: PHYSICIAN ASSISTANT

## 2023-03-02 PROCEDURE — 1159F MED LIST DOCD IN RCRD: CPT | Mod: CPTII,95,, | Performed by: PHYSICIAN ASSISTANT

## 2023-03-02 PROCEDURE — 4010F ACE/ARB THERAPY RXD/TAKEN: CPT | Mod: CPTII,95,, | Performed by: PHYSICIAN ASSISTANT

## 2023-03-02 PROCEDURE — 99203 OFFICE O/P NEW LOW 30 MIN: CPT | Mod: 95,,, | Performed by: PHYSICIAN ASSISTANT

## 2023-03-02 PROCEDURE — 1160F PR REVIEW ALL MEDS BY PRESCRIBER/CLIN PHARMACIST DOCUMENTED: ICD-10-PCS | Mod: CPTII,95,, | Performed by: PHYSICIAN ASSISTANT

## 2023-03-02 PROCEDURE — 1160F RVW MEDS BY RX/DR IN RCRD: CPT | Mod: CPTII,95,, | Performed by: PHYSICIAN ASSISTANT

## 2023-03-02 RX ORDER — ATORVASTATIN CALCIUM 10 MG/1
10 TABLET, FILM COATED ORAL DAILY
COMMUNITY

## 2023-03-02 RX ORDER — BUPROPION HYDROCHLORIDE 150 MG/1
150 TABLET ORAL DAILY
COMMUNITY

## 2023-03-02 RX ORDER — BUSPIRONE HYDROCHLORIDE 30 MG/1
30 TABLET ORAL 2 TIMES DAILY
COMMUNITY

## 2023-03-02 RX ORDER — AMLODIPINE AND BENAZEPRIL HYDROCHLORIDE 5; 10 MG/1; MG/1
1 CAPSULE ORAL DAILY
COMMUNITY

## 2023-03-02 RX ORDER — OXCARBAZEPINE 300 MG/1
150 TABLET, FILM COATED ORAL 2 TIMES DAILY
COMMUNITY

## 2023-03-02 RX ORDER — SEMAGLUTIDE 0.68 MG/ML
0.5 INJECTION, SOLUTION SUBCUTANEOUS
COMMUNITY

## 2023-03-02 RX ORDER — METFORMIN HYDROCHLORIDE 1000 MG/1
1000 TABLET ORAL 2 TIMES DAILY WITH MEALS
COMMUNITY

## 2023-03-02 RX ORDER — ERGOCALCIFEROL 1.25 MG/1
50000 CAPSULE ORAL
COMMUNITY

## 2023-03-02 NOTE — PROGRESS NOTES
HEPATOLOGY VIDEO VISIT NOTE - HCV clinic  Visit type: audiovisual    Each patient to whom he or she provides medical services by telemedicine is:  (1) informed of the relationship between the physician and patient and the respective role of any other health care provider with respect to management of the patient; and (2) notified that he or she may decline to receive medical services by telemedicine and may withdraw from such care at any time.    CHIEF COMPLAINT: Hepatitis C       HISTORY       This is a 47 y.o. White female being seen for HCV. Was seen by Redd COTTON in [phone], partially treated at that time and lost to f/u. Outside labs confirm she is still Hep C viremic, being seen to discuss Rx      HCV history:  Genotype 1a (documented 2018)  S/p 5 weeks mavyret (self d/c due to death in family)  (+) HCV risks after prior Mavyret: drug relapse, now clean x 2 yrs    6/2022 outside HCV RNA: 15,100,000 (scanned in media)  ??initial response w/ reinfection vs ??relapse after mavyret      Liver staging:  Prior staging initially conflicting so liver bx done 7/2018 revealing Grade 2, Stage 2 fibrosis    Imaging reveal no obvious evidence of advanced fibrosis  U/S 7/2022 - HM. Normal spleen. No ascites   Contrast CT 2/17/23 - unremarkable liver, spleen 12.9cm    No recent liver labs    Feels okay  Denies jaundice, dark urine, abdominal distention, hematemesis, melena, slowed mentation.       PMH, PSH, SOCIAL HX, FAMILY HX      Reviewed in Epic  Pertinent findings:  FAMILY HX: neg for liver diease  SOCIAL HX: resides locally  Alcohol - No  Drugs - prior use      ROS: as per HPI      PHYSICAL EXAM:  Friendly White female, in no acute distress; alert and oriented to person, place and time  LUNGS: Normal respiratory effort.  NEURO/PSYCH: Memory intact. Thought and speech pattern appropriate. Behavior normal. No depression or anxiety noted.      PERTINENT DIAGNOSTIC RESULTS      Lab Results   Component Value Date     WBC 9.88 03/22/2019    HGB 11.9 (L) 03/22/2019     (H) 03/22/2019     Lab Results   Component Value Date    INR 1.0 03/22/2019     Lab Results   Component Value Date    AST 23 03/22/2019    ALT 17 03/22/2019    BILITOT 0.8 03/22/2019    ALBUMIN 4.1 03/22/2019    ALKPHOS 142 (H) 03/22/2019    CREATININE 0.8 03/22/2019    BUN 14 03/22/2019     03/22/2019    K 3.6 03/22/2019         Outside labs: 6/2022  HBsAg - neg        ASSESSMENT        47 y.o. White female with:  1. CHRONIC HEPATITIS C, GENOTYPE ?  -- Prior HCV treatment - Mavyret x 5 weeks 2019.   -- Unclear if current viremia represents reinfection or relapse due to partial treatment        PLAN        1. Labs soon  2. F/u visit in ~ 4-6 wks    If liseth 1a noted, will order resistance testing.    Orders Placed This Encounter   Procedures    Hepatitis C Genotype    CBC Auto Differential    Comprehensive Metabolic Panel    Protime-INR    HCV Fibrosure    Hepatitis B Surface Antigen    Hepatitis B Surface Ab, Qualitative    Hepatitis B Core Antibody, Total    Hepatitis A antibody, IgG    HIV 1/2 Ag/Ab (4th Gen)         __________________________________________________________________    Duration of encounter: 39 min  This includes face-to-face time and non face-to-face time preparing to see the patient (eg, review of tests), obtaining and/or reviewing separately obtained history, documenting clinical information in the electronic or other health record, independently interpreting resultsand communicating results to the patient/family/caregiver, or care coordination.

## 2023-03-03 ENCOUNTER — TELEPHONE (OUTPATIENT)
Dept: HEPATOLOGY | Facility: CLINIC | Age: 48
End: 2023-03-03
Payer: MEDICAID

## 2023-03-03 NOTE — TELEPHONE ENCOUNTER
----- Message from Jennifer B. Scheuermann, PA sent at 3/2/2023  4:13 PM CST -----  Pls schedule 1.) labs soon. 2.) visit w/ me in ~ 6 wks. Virtual or in person is fine

## 2023-03-03 NOTE — TELEPHONE ENCOUNTER
Attempt made to reach patient.  LVM informing her that labs were scheduled 3/7/23 on day of PCP appt.  F/u visit scheduled 4/6/23; appt reminder notice mailed.   Danitza was seen today for a  hearing screening.  Danitza did not pass her hearing screening in the left ear prior to discharge from care with the [facility] practitioners.    An auditory brainstem response (ABR) was performed on both ears.  Danitza received a passing hearing screening on both ears, indicating normal hearing sensitivity, bilaterally.  Danitza was also screened for auditory neuropathy, which was negative.    The results of today's testing were reviewed with the infant's parents, and they were pleased.    This information will also be forwarded on to Dr. Rodriguez, and reported to [facility].    The family is encouraged to return in the future should any problems or concerns arise regarding their child's hearing.

## 2023-03-07 ENCOUNTER — LAB VISIT (OUTPATIENT)
Dept: LAB | Facility: HOSPITAL | Age: 48
End: 2023-03-07
Attending: PHYSICIAN ASSISTANT
Payer: MEDICAID

## 2023-03-07 DIAGNOSIS — B18.2 CHRONIC HEPATITIS C WITHOUT HEPATIC COMA: ICD-10-CM

## 2023-03-07 LAB
ALBUMIN SERPL BCP-MCNC: 4 G/DL (ref 3.5–5.2)
ALP SERPL-CCNC: 116 U/L (ref 55–135)
ALT SERPL W/O P-5'-P-CCNC: 35 U/L (ref 10–44)
ANION GAP SERPL CALC-SCNC: 10 MMOL/L (ref 8–16)
AST SERPL-CCNC: 74 U/L (ref 10–40)
BASOPHILS # BLD AUTO: 0.07 K/UL (ref 0–0.2)
BASOPHILS NFR BLD: 0.9 % (ref 0–1.9)
BILIRUB SERPL-MCNC: 0.8 MG/DL (ref 0.1–1)
BUN SERPL-MCNC: 11 MG/DL (ref 6–20)
CALCIUM SERPL-MCNC: 9.5 MG/DL (ref 8.7–10.5)
CHLORIDE SERPL-SCNC: 104 MMOL/L (ref 95–110)
CO2 SERPL-SCNC: 24 MMOL/L (ref 23–29)
CREAT SERPL-MCNC: 0.7 MG/DL (ref 0.5–1.4)
DIFFERENTIAL METHOD: ABNORMAL
EOSINOPHIL # BLD AUTO: 0 K/UL (ref 0–0.5)
EOSINOPHIL NFR BLD: 0 % (ref 0–8)
ERYTHROCYTE [DISTWIDTH] IN BLOOD BY AUTOMATED COUNT: 16.2 % (ref 11.5–14.5)
EST. GFR  (NO RACE VARIABLE): >60 ML/MIN/1.73 M^2
GLUCOSE SERPL-MCNC: 124 MG/DL (ref 70–110)
HAV IGG SER QL IA: NORMAL
HBV CORE AB SERPL QL IA: NORMAL
HBV SURFACE AB SER-ACNC: <3 MIU/ML
HBV SURFACE AB SER-ACNC: NORMAL M[IU]/ML
HBV SURFACE AG SERPL QL IA: NORMAL
HCT VFR BLD AUTO: 31.7 % (ref 37–48.5)
HGB BLD-MCNC: 10.1 G/DL (ref 12–16)
HIV 1+2 AB+HIV1 P24 AG SERPL QL IA: NORMAL
IMM GRANULOCYTES # BLD AUTO: 0.16 K/UL (ref 0–0.04)
IMM GRANULOCYTES NFR BLD AUTO: 2 % (ref 0–0.5)
INR PPP: 1 (ref 0.8–1.2)
LYMPHOCYTES # BLD AUTO: 1.2 K/UL (ref 1–4.8)
LYMPHOCYTES NFR BLD: 14.9 % (ref 18–48)
MCH RBC QN AUTO: 27.4 PG (ref 27–31)
MCHC RBC AUTO-ENTMCNC: 31.9 G/DL (ref 32–36)
MCV RBC AUTO: 86 FL (ref 82–98)
MONOCYTES # BLD AUTO: 0.5 K/UL (ref 0.3–1)
MONOCYTES NFR BLD: 5.9 % (ref 4–15)
NEUTROPHILS # BLD AUTO: 6.3 K/UL (ref 1.8–7.7)
NEUTROPHILS NFR BLD: 76.3 % (ref 38–73)
NRBC BLD-RTO: 0 /100 WBC
PLATELET # BLD AUTO: 282 K/UL (ref 150–450)
PMV BLD AUTO: 9.6 FL (ref 9.2–12.9)
POTASSIUM SERPL-SCNC: 4.4 MMOL/L (ref 3.5–5.1)
PROT SERPL-MCNC: 7.3 G/DL (ref 6–8.4)
PROTHROMBIN TIME: 10.9 SEC (ref 9–12.5)
RBC # BLD AUTO: 3.68 M/UL (ref 4–5.4)
SODIUM SERPL-SCNC: 138 MMOL/L (ref 136–145)
WBC # BLD AUTO: 8.18 K/UL (ref 3.9–12.7)

## 2023-03-07 PROCEDURE — 36415 COLL VENOUS BLD VENIPUNCTURE: CPT | Mod: PO | Performed by: PHYSICIAN ASSISTANT

## 2023-03-07 PROCEDURE — 85025 COMPLETE CBC W/AUTO DIFF WBC: CPT | Performed by: PHYSICIAN ASSISTANT

## 2023-03-07 PROCEDURE — 87340 HEPATITIS B SURFACE AG IA: CPT | Performed by: PHYSICIAN ASSISTANT

## 2023-03-07 PROCEDURE — 86706 HEP B SURFACE ANTIBODY: CPT | Performed by: PHYSICIAN ASSISTANT

## 2023-03-07 PROCEDURE — 80053 COMPREHEN METABOLIC PANEL: CPT | Performed by: PHYSICIAN ASSISTANT

## 2023-03-07 PROCEDURE — 86704 HEP B CORE ANTIBODY TOTAL: CPT | Performed by: PHYSICIAN ASSISTANT

## 2023-03-07 PROCEDURE — 85610 PROTHROMBIN TIME: CPT | Performed by: PHYSICIAN ASSISTANT

## 2023-03-07 PROCEDURE — 81596 NFCT DS CHRNC HCV 6 ASSAYS: CPT | Performed by: PHYSICIAN ASSISTANT

## 2023-03-07 PROCEDURE — 86790 VIRUS ANTIBODY NOS: CPT | Performed by: PHYSICIAN ASSISTANT

## 2023-03-07 PROCEDURE — 87389 HIV-1 AG W/HIV-1&-2 AB AG IA: CPT | Performed by: PHYSICIAN ASSISTANT

## 2023-03-07 PROCEDURE — 87902 NFCT AGT GNTYP ALYS HEP C: CPT | Performed by: PHYSICIAN ASSISTANT

## 2023-03-09 LAB
A2 MACROGLOB SERPL-MCNC: 175 MG/DL (ref 100–280)
ALT SERPL W P-5'-P-CCNC: 37 U/L (ref 7–45)
APO A-I SERPL-MCNC: 127 MG/DL
BILIRUB SERPL-MCNC: 0.7 MG/DL
BIOPREDICTIVE SERIAL NUMBER: ABNORMAL
FIBROSIS STAGE SERPL QL: ABNORMAL
FIBROTEST INTERPRETATION: ABNORMAL
FIBROTEST-ACTITEST COMMENT: ABNORMAL
GGT SERPL-CCNC: 87 U/L (ref 5–36)
HAPTOGLOB SERPL-MCNC: 133 MG/DL (ref 30–200)
LIVER FIBR SCORE SERPL CALC.FIBROSURE: 0.27
NECROINFLAMMAT INTERP: ABNORMAL
NECROINFLAMMATORY ACT GRADE SERPL QL: ABNORMAL
NECROINFLAMMATORY ACT SCORE SERPL: 0.19

## 2023-03-13 ENCOUNTER — TELEPHONE (OUTPATIENT)
Dept: HEPATOLOGY | Facility: CLINIC | Age: 48
End: 2023-03-13
Payer: MEDICAID

## 2023-03-13 DIAGNOSIS — B18.2 CHRONIC HEPATITIS C WITHOUT HEPATIC COMA: Primary | ICD-10-CM

## 2023-03-13 LAB
HCV GENTYP SERPL NAA+PROBE: ABNORMAL
HCV RNA SERPL NAA+PROBE-LOG IU: 6.98 LOG (10) IU/ML
HCV RNA SERPL QL NAA+PROBE: DETECTED
HCV RNA SPEC NAA+PROBE-ACNC: ABNORMAL IU/ML

## 2023-03-13 NOTE — TELEPHONE ENCOUNTER
3/7/23 labs - HCV Vickie 1a    Pls tell pt Hep C virus is the same time she had several years ago. I now need add'l labs to try and determine if it is the exact same virus or a new infection.    Pls schedule NS5A resistance testing

## 2023-03-14 NOTE — TELEPHONE ENCOUNTER
Attempt made to reach patient.  Msg from PA Scheuermann left on her VM and mailed to her.  I stressed that she call for scheduling.

## 2023-03-16 ENCOUNTER — TELEPHONE (OUTPATIENT)
Dept: HEPATOLOGY | Facility: CLINIC | Age: 48
End: 2023-03-16
Payer: MEDICAID

## 2023-03-16 DIAGNOSIS — B18.2 CHRONIC HEPATITIS C WITHOUT HEPATIC COMA: Primary | ICD-10-CM

## 2023-03-17 ENCOUNTER — LAB VISIT (OUTPATIENT)
Dept: LAB | Facility: HOSPITAL | Age: 48
End: 2023-03-17
Payer: MEDICAID

## 2023-03-17 ENCOUNTER — TELEPHONE (OUTPATIENT)
Dept: HEPATOLOGY | Facility: CLINIC | Age: 48
End: 2023-03-17
Payer: MEDICAID

## 2023-03-17 DIAGNOSIS — B18.2 CHRONIC HEPATITIS C WITHOUT HEPATIC COMA: ICD-10-CM

## 2023-03-17 PROCEDURE — 36415 COLL VENOUS BLD VENIPUNCTURE: CPT | Mod: PO | Performed by: PHYSICIAN ASSISTANT

## 2023-03-17 PROCEDURE — 30000890 ARUP MISCELLANEOUS TEST 1: Performed by: PHYSICIAN ASSISTANT

## 2023-03-17 PROCEDURE — 87902 NFCT AGT GNTYP ALYS HEP C: CPT | Performed by: PHYSICIAN ASSISTANT

## 2023-03-17 NOTE — TELEPHONE ENCOUNTER
----- Message from Jennifer B. Scheuermann, PA sent at 3/16/2023  5:17 PM CDT -----  Lab sent msg that Vickie 1a NS5A resistance was cancelled b/c not processed in timely fashion. Pls r/s soon. Hoping to have results prior to her visit.   thanks

## 2023-03-17 NOTE — TELEPHONE ENCOUNTER
I spoke with patient.  Resistance testing scheduled to take place today at [facility].

## 2023-03-29 LAB — ARUP MISCELLANEOUS TEST 1: NORMAL

## 2023-04-06 ENCOUNTER — SPECIALTY PHARMACY (OUTPATIENT)
Dept: PHARMACY | Facility: CLINIC | Age: 48
End: 2023-04-06
Payer: MEDICAID

## 2023-04-06 ENCOUNTER — OFFICE VISIT (OUTPATIENT)
Dept: HEPATOLOGY | Facility: CLINIC | Age: 48
End: 2023-04-06
Payer: MEDICAID

## 2023-04-06 ENCOUNTER — TELEPHONE (OUTPATIENT)
Dept: SPORTS MEDICINE | Facility: CLINIC | Age: 48
End: 2023-04-06
Payer: MEDICAID

## 2023-04-06 DIAGNOSIS — B18.2 CHRONIC HEPATITIS C WITHOUT HEPATIC COMA: Primary | ICD-10-CM

## 2023-04-06 PROCEDURE — 1159F MED LIST DOCD IN RCRD: CPT | Mod: CPTII,95,, | Performed by: PHYSICIAN ASSISTANT

## 2023-04-06 PROCEDURE — 4010F PR ACE/ARB THEARPY RXD/TAKEN: ICD-10-PCS | Mod: CPTII,95,, | Performed by: PHYSICIAN ASSISTANT

## 2023-04-06 PROCEDURE — 1160F RVW MEDS BY RX/DR IN RCRD: CPT | Mod: CPTII,95,, | Performed by: PHYSICIAN ASSISTANT

## 2023-04-06 PROCEDURE — 99214 OFFICE O/P EST MOD 30 MIN: CPT | Mod: 95,,, | Performed by: PHYSICIAN ASSISTANT

## 2023-04-06 PROCEDURE — 1160F PR REVIEW ALL MEDS BY PRESCRIBER/CLIN PHARMACIST DOCUMENTED: ICD-10-PCS | Mod: CPTII,95,, | Performed by: PHYSICIAN ASSISTANT

## 2023-04-06 PROCEDURE — 99214 PR OFFICE/OUTPT VISIT, EST, LEVL IV, 30-39 MIN: ICD-10-PCS | Mod: 95,,, | Performed by: PHYSICIAN ASSISTANT

## 2023-04-06 PROCEDURE — 4010F ACE/ARB THERAPY RXD/TAKEN: CPT | Mod: CPTII,95,, | Performed by: PHYSICIAN ASSISTANT

## 2023-04-06 PROCEDURE — 1159F PR MEDICATION LIST DOCUMENTED IN MEDICAL RECORD: ICD-10-PCS | Mod: CPTII,95,, | Performed by: PHYSICIAN ASSISTANT

## 2023-04-06 RX ORDER — FAMOTIDINE 40 MG/1
40 TABLET, FILM COATED ORAL DAILY
Qty: 30 TABLET | Refills: 3 | Status: SHIPPED | OUTPATIENT
Start: 2023-04-06 | End: 2024-04-05

## 2023-04-06 RX ORDER — VELPATASVIR AND SOFOSBUVIR 100; 400 MG/1; MG/1
1 TABLET, FILM COATED ORAL DAILY
Qty: 28 TABLET | Refills: 2 | Status: SHIPPED | OUTPATIENT
Start: 2023-04-06 | End: 2023-10-06

## 2023-04-06 NOTE — PROGRESS NOTES
HEPATOLOGY VIDEO VISIT NOTE - HCV clinic  Visit type: audiovisual    Each patient to whom he or she provides medical services by telemedicine is:  (1) informed of the relationship between the physician and patient and the respective role of any other health care provider with respect to management of the patient; and (2) notified that he or she may decline to receive medical services by telemedicine and may withdraw from such care at any time.    CHIEF COMPLAINT: Hepatitis C       HISTORY       This is a 47 y.o. White female w/ HCV, being seen for f/u.    Interval history:  Labs after last visit confirm HCV Vickie 1a, no NS5A resistance --> indicates likely reinfection as opposed to relapsed virus.    Lacking HAV, HBV immunity  No evidence advanced fibrosis  HIV screen neg    Doing well  Motivated to have HCV treated  Denies jaundice, dark urine, hematemesis, melena, slowed mentation, abdominal distention.     (+) omeprazole use - has GERD sx if she missed more than 3d of med.  On several psych meds including trileptal. Very hesitant to have them changed as it took much work and inpt treatment to finally find a regimen that works well for her.      HCV history:  Genotype 1a (documented 2018)  S/p 5 weeks mavyret 2019 (self d/c due to death in family)  (+) HCV risks after Mavyret (drug relapse, now clean x 2 yrs)    6/2022 outside HCV RNA: 15,100,000 (scanned in media)  ??initial response w/ reinfection vs ??relapse after mavyret      Liver staging:  Prior staging initially conflicting so liver bx done 7/2018 revealing Grade 2, Stage 2 fibrosis  FibroSURE 3/2023 - A0-1, F0-1    Labs / imaging support staging  U/S 7/2022 - . Normal spleen. No ascites   Contrast CT 2/17/23 - unremarkable liver, spleen 12.9cm        PMH, PSH, SOCIAL HX, FAMILY HX      Reviewed in Epic  Pertinent findings:  FAMILY HX: neg for liver diease  SOCIAL HX: resides locally  Alcohol - No  Drugs - prior use      ROS: as per HPI      PHYSICAL  EXAM:  Friendly White female, in no acute distress; alert and oriented to person, place and time  LUNGS: Normal respiratory effort.  NEURO/PSYCH: Memory intact. Thought and speech pattern appropriate. Behavior normal. No depression or anxiety noted.      PERTINENT DIAGNOSTIC RESULTS      Lab Results   Component Value Date    WBC 8.18 03/07/2023    HGB 10.1 (L) 03/07/2023     03/07/2023     Lab Results   Component Value Date    INR 1.0 03/07/2023     Lab Results   Component Value Date    AST 74 (H) 03/07/2023    ALT 35 03/07/2023    BILITOT 0.8 03/07/2023    ALBUMIN 4.0 03/07/2023    ALKPHOS 116 03/07/2023    CREATININE 0.7 03/07/2023    BUN 11 03/07/2023     03/07/2023    K 4.4 03/07/2023         Outside labs: 6/2022  HBsAg - neg        ASSESSMENT        47 y.o. White female with:  1. CHRONIC HEPATITIS C, GENOTYPE 1a - reinfection  -- Prior (partial) HCV treatment - Mavyret x 5 weeks 2019 - appears to have been successful based on lack of NS5A resistance mutations at present. Appears current infection indicates reinfection  -- no evidence advanced fibrosis    2. BIPOLAR, OCD  -- well controlled on several meds, including trileptal    3. GERD    PLAN        1. Obtain authorization to treat HCV with Epclusa x 12 weeks  -- Rx will be routed to [facility]  -- Patient will notify me of exact treatment start date so appropriate lab f/u can be scheduled.  2. Twinrinx to local pharmacy  3. While on epclusa - hold omeprazole, take pepcid 40mg instead  4. HCV RNA at treatment week 6 given concomitant trileptal use. Consider extending therapy if very slow virologic response      ______________________________________________________________________________  EDUCATION:  HCV RX  Discussed goal of HCV eradication to prevent progression of liver disease.  Discussed use of Epclusa daily x 12 weeks w/ potential side effects of fatigue and headache.     Reviewed limitations on acid suppressant medications  due to DDI w/ Epclusa:  -- Antacids - not taking  -- H2 Receptor Antagonist - Pepcid 40mg -Must be dosed once daily at same time as Epclusa.   -- PPI - dosed 4 hours after Epclusa is taken with food  Patient instructed to contact me if experiencing acid related symptoms so further recommendations can be made regarding acid suppression therapy.      Reviewed limitations on statin therapy and Epclusa:  -- Patient taking atorvastatin, Can continue but dose can not be increased.    Herbal / alternative therapies must be discontinued  Discussed importance of medication adherence and risk of treatment failure / viral resistance if not adherent. Pt has verbalized understanding.    __________________________________________________________________    Duration of encounter: 30 min  This includes face-to-face time and non face-to-face time preparing to see the patient (eg, review of tests), obtaining and/or reviewing separately obtained history, documenting clinical information in the electronic or other health record, independently interpreting resultsand communicating results to the patient/family/caregiver, or care coordination.

## 2023-04-06 NOTE — TELEPHONE ENCOUNTER
AG Epclusa test claim - $0 copay. No PA required. [place] [employer]

## 2023-04-06 NOTE — TELEPHONE ENCOUNTER
----- Message from Kathya Bobo sent at 4/5/2023 12:36 PM CDT -----  Regarding: referral  Contact: [phone]  Esther Gage calling regarding Patient Advice (message) for #would like to know if referral was received. She state it was sent yesterday to [facility]. Please call

## 2023-04-10 ENCOUNTER — SPECIALTY PHARMACY (OUTPATIENT)
Dept: PHARMACY | Facility: CLINIC | Age: 48
End: 2023-04-10
Payer: MEDICAID

## 2023-04-10 DIAGNOSIS — B18.2 CHRONIC HEPATITIS C WITHOUT HEPATIC COMA: Primary | ICD-10-CM

## 2023-04-10 NOTE — TELEPHONE ENCOUNTER
Specialty Pharmacy - Initial Clinical Assessment  Epclusa 1 of 3    Specialty Medication Orders Linked to Encounter      Flowsheet Row Most Recent Value   Medication #1 sofosbuvir-velpatasvir 400-100 mg Tab (Order#[id number], Rx#[id number])          Patient Diagnosis   B19.20 - Hepatitis C    Subjective    Esther Gage is a 47 y.o. female, who is followed by the specialty pharmacy service for management and education.    Recent Encounters       Date Type Provider Description    04/10/2023 Specialty Pharmacy Thom Seaman, [place] Initial Clinical Assessment    04/06/2023 Specialty Pharmacy Ana Jones PharmD Referral Authorization          Clinical call attempts since last clinical assessment   No call attempts found.     Current Outpatient Medications   Medication Sig Note    albuterol (PROVENTIL/VENTOLIN HFA) 90 mcg/actuation inhaler INHALE 2 PUFFS BY MOUTH EVERY 6 HOURS AS NEEDED FOR WHEEZING. RESCUE INHALER     amlodipine-benazepril 5-10 mg (LOTREL) 5-10 mg per capsule Take 1 capsule by mouth once daily.     atorvastatin (LIPITOR) 10 MG tablet Take 10 mg by mouth once daily.     blood pressure monitor ([employer] EASE) LG arm size (OP) by Other route as needed for High Blood Pressure.     buPROPion (WELLBUTRIN XL) 150 MG TB24 tablet Take 150 mg by mouth once daily.     busPIRone (BUSPAR) 30 MG Tab Take 30 mg by mouth 2 (two) times daily.     butalbital-acetaminophen-caffeine -40 mg (FIORICET, ESGIC) -40 mg per tablet TAKE 1 TABLET BY MOUTH EVERY 6 HOURS AS NEEDED     clonazePAM (KLONOPIN) 2 MG Tab TAKE 1 TABLET(2 MG) BY MOUTH EVERY DAY (Patient taking differently: Take 3 mg by mouth. TAKE 1.5 TABLET(3 MG) BY MOUTH EVERY DAY)     ergocalciferol (ERGOCALCIFEROL) 50,000 unit Cap Take 50,000 Units by mouth every 7 days.     estradiol (ESTRACE) 2 MG tablet TAKE 1 TABLET(2 MG) BY MOUTH EVERY DAY     estradioL (ESTRACE) 2 MG tablet TAKE 1 TABLET BY MOUTH EVERY DAY     famotidine (PEPCID) 40 MG  tablet Take 1 tablet (40 mg total) by mouth once daily. Take at same time as epclusa     FLUoxetine 20 MG capsule TAKE 1 CAPSULE(20 MG) BY MOUTH EVERY DAY (Patient taking differently: Take 60 mg by mouth once daily.)     furosemide (LASIX) 20 MG tablet TAKE 1 TABLET(20 MG) BY MOUTH DAILY AS NEEDED 4/10/2023: Currently not taking    gabapentin (NEURONTIN) 600 MG tablet TAKE 1 TABLET(600 MG) BY MOUTH THREE TIMES DAILY     hepatitis A and B vaccine, PF, (TWINRIX) 720 HANDY unit- 20 mcg/mL Syrg suspension Inject 1mL IM at 0, 1, and 6 months     ibuprofen (ADVIL,MOTRIN) 800 MG tablet TAKE 1 TABLET(800 MG) BY MOUTH THREE TIMES DAILY AS NEEDED     insulin glargine,hum.rec.anlog (LANTUS SUBQ) Inject 40 Units into the skin 2 (two) times a day.     insulin regular 100 unit/mL Inj injection Inject into the skin 3 (three) times daily before meals. Sliding scale, as needed     metFORMIN (GLUCOPHAGE) 1000 MG tablet Take 1,000 mg by mouth 2 (two) times daily with meals.     omeprazole (PRILOSEC) 40 MG capsule Take 1 capsule (40 mg total) by mouth every morning. 4/10/2023: Currently holding while on Epclusa therapy    OXcarbazepine (TRILEPTAL) 300 MG Tab Take 150 mg by mouth 2 (two) times daily.     oxybutynin (DITROPAN) 5 MG Tab TAKE 1 TABLET(5 MG) BY MOUTH THREE TIMES DAILY     promethazine (PHENERGAN) 25 MG tablet TAKE 1 TABLET(25 MG) BY MOUTH EVERY 6 HOURS AS NEEDED FOR NAUSEA     semaglutide (OZEMPIC) 0.25 mg or 0.5 mg (2 mg/3 mL) PnIj Inject 0.5 mg into the skin every 7 days.     sofosbuvir-velpatasvir 400-100 mg Tab Take 1 tablet by mouth once daily.     sumatriptan (IMITREX) 100 MG tablet Take 1 tablet (100 mg total) by mouth every 2 (two) hours as needed for Migraine (Maximum of 2 doses in a 24 hour period.).     tiZANidine (ZANAFLEX) 4 MG tablet TAKE 1 TABLET BY MOUTH EVERY 8 HOURS AS NEEDED    Last reviewed on 4/10/2023  4:08 PM by Thom Seaman PharmD    Review of patient's allergies indicates:   Allergen  Reactions    Adhesive    Last reviewed on  4/6/2023 2:53 PM by Jennifer B Scheuermann          Assessment Questions - Documented Responses      Flowsheet Row Most Recent Value   Assessment    Medication Reconciliation completed for patient Yes   During the past 4 weeks, has patient missed any activities due to condition or medication? No   During the past 4 weeks, did patient have any of the following urgent care visits? None   Goals of Therapy Status Discussed (new start)   Status of the patients ability to self-administer: Is Able   All education points have been covered with patient? Yes, supplemental printed education provided   Welcome packet contents reviewed and discussed with patient? Yes   Assesment completed? Yes   Plan Therapy being initiated   Do you need to open a clinical intervention (i-vent)? No   Do you want to schedule first shipment? Yes   Medication #1 Assessment Info    Patient status New medication, New to [facility]  [Filled with us >3 years ago]   Is this medication appropriate for the patient? Yes   Is this medication effective? Not yet started          Refill Questions - Documented Responses      Flowsheet Row Most Recent Value   Patient Availability and HIPAA Verification    Does patient want to proceed with activity? Yes   HIPAA/medical authority confirmed? Yes   Relationship to patient of person spoken to? Self   Refill Screening Questions    When does the patient need to receive the medication? 04/12/23   Refill Delivery Questions    How will the patient receive the medication? MEDRx   When does the patient need to receive the medication? 04/12/23   Shipping Address Home   Address in [facility] confirmed and updated if neccessary? Yes   Expected Copay ($) 0   Is the patient able to afford the medication copay? Yes   Payment Method zero copay   Days supply of Refill 28   Supplies needed? No supplies needed   Refill activity completed? Yes   Refill activity plan Refill scheduled  "  Shipment/Pickup Date: 04/11/23            Objective    She has a past medical history of ADHD, Allergy, Asthma, Bipolar disorder, Diabetes, Gestational diabetes mellitus (2001), Hepatitis C - received partial Rx, LOST TO F/U, HTN (hypertension), Hyperlipidemia, Interstitial cystitis, Migraine headache, and OCD (obsessive compulsive disorder).    Past medications: Mavyret x 5 weeks (complete response)    BP Readings from Last 4 Encounters:   10/22/19 (!) 137/93   09/03/19 134/80   03/22/19 137/72   02/20/19 102/78     Ht Readings from Last 4 Encounters:   10/22/19 5' 1" (1.549 m)   03/22/19 5' (1.524 m)   02/20/19 5' (1.524 m)   12/21/18 5' 1" (1.549 m)     Wt Readings from Last 4 Encounters:   10/22/19 85 kg (187 lb 6.3 oz)   09/03/19 89.8 kg (198 lb)   03/22/19 91.3 kg (201 lb 4.5 oz)   02/20/19 95.5 kg (210 lb 8.6 oz)     No results found for: HCVGENOTYPE  Recent Labs   Lab Result Units 03/07/23  1133   Creatinine mg/dL 0.7   ALT U/L 35   AST U/L 74 H   Hepatitis B Surface Ag  Non-reactive   Hep B S Ab  <3.00  Non-reactive   Hep B Core Total Ab  Non-reactive     The goals of Hepatitis C Virus (HCV) infection treatment include:  Reducing all-cause mortality and liver-related health adverse consequences, including cirrhosis, end-stage liver disease, and hepatocellular carcinoma  Achieving virologic cure as evidenced by a sustained virologic response  Improving or maintaining quality of life  Maintaining optimal therapy adherence  Minimizing and managing side effects  Preventing the transmission of HCV      Goals of Therapy Status: Discussed (new start)    Assessment/Plan  Patient plans to get therapy delivered on 04/12/23. She will start first thing on 4/13/23.      Indication, dosage, appropriateness, effectiveness, safety and convenience of her specialty medication(s) were reviewed today.     Patient Education   Patient received education on the following:   Expectations and possible outcomes of therapy  Proper " use, timely administration, and missed dose management  Duration of therapy  Side effects, including prevention, minimization, and management  Contraindications and safety precautions  New or changed medications, including prescribe and over the counter medications and supplements  Reviews recommended vaccinations, as appropriate  Storage, safe handling, and disposal    Patient is very aware to hold omeprazole therapy during Epclusa treatment and will take Pepcid 40 mg at exactly the same time each day with Epclusa. She has already started taking Pepcid therapy and will let us know if her GERD is not well-managed. Per Chart notes - in the past if she was off omeprazole for 3 days her symptoms would come back. Closely follow-up with her acid reflux status and adherence to the specific scheduling with Epclusa. For now, I counseled her to only take Pepcid and to avoid all other types of antacids; but to call us if symptoms of GERD develop/worsen.     Additionally, patient will continue oxcarbazepine therapy during treatment; provider is aware of concomitant use - although oxcarbazepine was removed from the package insert list of contraindicated agents - MD will follow-up very closely to ensure therapy is working adequately. MD plans to follow-up at 6 weeks and get a HCV RNA viral load - and will consider extending therapy if the virologic response is slow.    Tasks added this encounter   5/3/2023 - Refill Call (Auto Added)  4/19/2023 - 7 Day Post Start Touchbase   Tasks due within next 3 months   No tasks due.     Thom Seaman, PharmD  [facility]  [address] [place], [place] [address]  [facility] [address]  Phone: [phone]  Fax: [phone]

## 2023-04-19 ENCOUNTER — SPECIALTY PHARMACY (OUTPATIENT)
Dept: PHARMACY | Facility: CLINIC | Age: 48
End: 2023-04-19
Payer: MEDICAID

## 2023-04-20 ENCOUNTER — TELEPHONE (OUTPATIENT)
Dept: HEPATOLOGY | Facility: CLINIC | Age: 48
End: 2023-04-20
Payer: MEDICAID

## 2023-04-21 DIAGNOSIS — B18.2 CHRONIC HEPATITIS C WITHOUT HEPATIC COMA: Primary | ICD-10-CM

## 2023-04-21 NOTE — TELEPHONE ENCOUNTER
Attempt made to reach patient.  Msg from PA Scheuermann left on her VM and mailed to her.  Lab draw scheduled 10/5/23; appt reminder notice mailed.

## 2023-04-21 NOTE — TELEPHONE ENCOUNTER
Pt beginning 12 weeks of Epclusa on 4/13/23  Anticipated treatment end date: 7/5/23  Vickie 1a  Treatment naive  Prior treatment - Mavyret (but suspect current infection is new infection)  F0-2      Please review with patient:  Hepatitis C treatment with Epclusa will last 12 weeks. This means TWO more shipments of Epclusa will come from pharmacy. If he she has any problems getting future shipments please call us right away!  After treatment ends there is a small chance the virus can return. We will do lab work 3 months after treatment ends to see if virus is cured.    Please update episode & schedule labs to see if the virus is cured  - LFT, HCV RNA - SVR12 - 10/5/23      thanks

## 2023-04-23 ENCOUNTER — PATIENT MESSAGE (OUTPATIENT)
Dept: HEPATOLOGY | Facility: CLINIC | Age: 48
End: 2023-04-23
Payer: MEDICAID

## 2023-05-03 ENCOUNTER — SPECIALTY PHARMACY (OUTPATIENT)
Dept: PHARMACY | Facility: CLINIC | Age: 48
End: 2023-05-03
Payer: MEDICAID

## 2023-05-04 NOTE — TELEPHONE ENCOUNTER
Specialty Pharmacy - Refill Coordination    AG EPCLUSA REFILL 2 of 3    Specialty Medication Orders Linked to Encounter      Flowsheet Row Most Recent Value   Medication #1 sofosbuvir-velpatasvir 400-100 mg Tab (Order#[id number], Rx#[id number])          AG Epclusa refill (2 of 3) confirmed and reassessment complete.     Patient has 6 doses of Epclusa remaining and takes it in the AM daily. Side effect of nausea still resolved but continues to have HA which is tolerable with Fioricet and Ibuprofen. No missed doses, no new medications, no new allergies or health conditions reported at this time. Allergies reviewed and medication reconciliation complete (reviewed and documented in [facility]). Patient continues to remain off of Prilosec and takes Pepcid 40 mg once daily at the same time as Epclusa dose. Pepcid does not work as well as Prilosec. Patient advised she can use antacids (Tums, Pepto, Rolaids)  4 hours away from Epclusa dose for breakthrough symptoms. Disease education reviewed (including transmission and prevention). Patient counseled on importance of maintaining adherence and keeping lab appointments which were scheduled. All questions answered and addressed to patients satisfaction. Advised to call [facility] and provider if any issues arise.     Refill Questions - Documented Responses      Flowsheet Row Most Recent Value   Patient Availability and HIPAA Verification    Does patient want to proceed with activity? Yes   HIPAA/medical authority confirmed? Yes   Relationship to patient of person spoken to? Self   Refill Screening Questions    Changes to allergies? No   Changes to medications? No   New conditions since last clinic visit? No   Unplanned office visit, urgent care, ED, or hospital admission in the last 4 weeks? No   How does patient/caregiver feel medication is working? Good   Financial problems or insurance changes? No   How many doses of your specialty medications  were missed in the last 4 weeks? 0   When does the patient need to receive the medication? 05/10/23   Refill Delivery Questions    How will the patient receive the medication? MEDRx   When does the patient need to receive the medication? 05/10/23   Shipping Address Home   Address in [facility] confirmed and updated if neccessary? Yes   Expected Copay ($) 0   Is the patient able to afford the medication copay? Yes   Payment Method zero copay   Days supply of Refill 28   Supplies needed? No supplies needed   Refill activity completed? Yes   Refill activity plan Refill scheduled   Shipment/Pickup Date: 05/05/23            Current Outpatient Medications   Medication Sig    albuterol (PROVENTIL/VENTOLIN HFA) 90 mcg/actuation inhaler INHALE 2 PUFFS BY MOUTH EVERY 6 HOURS AS NEEDED FOR WHEEZING. RESCUE INHALER    amlodipine-benazepril 5-10 mg (LOTREL) 5-10 mg per capsule Take 1 capsule by mouth once daily.    atorvastatin (LIPITOR) 10 MG tablet Take 10 mg by mouth once daily.    blood pressure monitor ([facility] EASE) LG arm size (OP) by Other route as needed for High Blood Pressure.    buPROPion (WELLBUTRIN XL) 150 MG TB24 tablet Take 150 mg by mouth once daily.    busPIRone (BUSPAR) 30 MG Tab Take 30 mg by mouth 2 (two) times daily.    butalbital-acetaminophen-caffeine -40 mg (FIORICET, ESGIC) -40 mg per tablet TAKE 1 TABLET BY MOUTH EVERY 6 HOURS AS NEEDED    clonazePAM (KLONOPIN) 2 MG Tab TAKE 1 TABLET(2 MG) BY MOUTH EVERY DAY (Patient taking differently: Take 3 mg by mouth. TAKE 1.5 TABLET(3 MG) BY MOUTH EVERY DAY)    ergocalciferol (ERGOCALCIFEROL) 50,000 unit Cap Take 50,000 Units by mouth every 7 days.    estradiol (ESTRACE) 2 MG tablet TAKE 1 TABLET(2 MG) BY MOUTH EVERY DAY    estradioL (ESTRACE) 2 MG tablet TAKE 1 TABLET BY MOUTH EVERY DAY    famotidine (PEPCID) 40 MG tablet Take 1 tablet (40 mg total) by mouth once daily. Take at same time as epclusa    FLUoxetine 20 MG capsule TAKE 1 CAPSULE(20  MG) BY MOUTH EVERY DAY (Patient taking differently: Take 60 mg by mouth once daily.)    furosemide (LASIX) 20 MG tablet TAKE 1 TABLET(20 MG) BY MOUTH DAILY AS NEEDED    gabapentin (NEURONTIN) 600 MG tablet TAKE 1 TABLET(600 MG) BY MOUTH THREE TIMES DAILY    hepatitis A and B vaccine, PF, (TWINRIX) 720 HANDY unit- 20 mcg/mL Syrg suspension Inject 1mL IM at 0, 1, and 6 months    ibuprofen (ADVIL,MOTRIN) 800 MG tablet TAKE 1 TABLET(800 MG) BY MOUTH THREE TIMES DAILY AS NEEDED    insulin glargine,hum.rec.anlog (LANTUS SUBQ) Inject 40 Units into the skin 2 (two) times a day.    insulin regular 100 unit/mL Inj injection Inject into the skin 3 (three) times daily before meals. Sliding scale, as needed    metFORMIN (GLUCOPHAGE) 1000 MG tablet Take 1,000 mg by mouth 2 (two) times daily with meals.    omeprazole (PRILOSEC) 40 MG capsule Take 1 capsule (40 mg total) by mouth every morning.    OXcarbazepine (TRILEPTAL) 300 MG Tab Take 150 mg by mouth 2 (two) times daily.    oxybutynin (DITROPAN) 5 MG Tab TAKE 1 TABLET(5 MG) BY MOUTH THREE TIMES DAILY    promethazine (PHENERGAN) 25 MG tablet TAKE 1 TABLET(25 MG) BY MOUTH EVERY 6 HOURS AS NEEDED FOR NAUSEA    semaglutide (OZEMPIC) 0.25 mg or 0.5 mg (2 mg/3 mL) PnIj Inject 0.5 mg into the skin every 7 days.    sofosbuvir-velpatasvir 400-100 mg Tab Take 1 tablet by mouth once daily.    sumatriptan (IMITREX) 100 MG tablet Take 1 tablet (100 mg total) by mouth every 2 (two) hours as needed for Migraine (Maximum of 2 doses in a 24 hour period.).    tiZANidine (ZANAFLEX) 4 MG tablet TAKE 1 TABLET BY MOUTH EVERY 8 HOURS AS NEEDED   Last reviewed on 4/10/2023  4:08 PM by Thom Seaman, Solomon    Review of patient's allergies indicates:   Allergen Reactions    Adhesive     Last reviewed on  4/6/2023 2:53 PM by Jennifer B Scheuermann      Tasks added this encounter   No tasks added.   Tasks due within next 3 months   No tasks due.     Ana Jones, PharmD  [facility]  [address] [facility] [address]  Phone: [phone]  Fax: [phone]

## 2023-05-31 ENCOUNTER — SPECIALTY PHARMACY (OUTPATIENT)
Dept: PHARMACY | Facility: CLINIC | Age: 48
End: 2023-05-31
Payer: MEDICAID

## 2023-05-31 NOTE — TELEPHONE ENCOUNTER
Specialty Pharmacy - Refill Coordination    AG EPCLUSA REFILL 3 of 3  Specialty Medication Orders Linked to Encounter      Flowsheet Row Most Recent Value   Medication #1 sofosbuvir-velpatasvir 400-100 mg Tab (Order#[id number], Rx#[id number])          AG Epclusa refill (3 of 3) confirmed and reassessment complete.     Patient has 8 doses of Epclusa remaining and takes it around the AM daily. Pt reports they are not having any side effects at this time. No missed doses, no new medications, no new allergies or health conditions reported at this time. Allergies reviewed and medication reconciliation complete (reviewed and documented in [facility]).  Patient continues to remain off of Prilosec and takes Pepcid 40 mg once daily at the same time as Epclusa dose. Pepcid does not work as well as Prilosec. Patient advised she can use antacids (Tums, Pepto, Rolaids)  4 hours away from Epclusa dose for breakthrough symptoms. She is using antacids PRN but heartburn is still not properly controlled. She does not want to trial PPI at this time due to DDI but knows to call [facility] if heartburn becomes intolerable with Pepcid and antacids alone. Disease education reviewed (including transmission and prevention). Patient counseled on importance of maintaining adherence and keeping lab appointments which were scheduled. All questions answered and addressed to patients satisfaction. Advised to call [facility] and provider if any issues arise.     Refill Questions - Documented Responses      Flowsheet Row Most Recent Value   Patient Availability and HIPAA Verification    Does patient want to proceed with activity? Yes   HIPAA/medical authority confirmed? Yes   Relationship to patient of person spoken to? Self   Refill Screening Questions    Changes to allergies? No   Changes to medications? No   New conditions since last clinic visit? No   Unplanned office visit, urgent care, ED, or hospital admission in the  last 4 weeks? No   How does patient/caregiver feel medication is working? Too soon to tell   Financial problems or insurance changes? No   How many doses of your specialty medications were missed in the last 4 weeks? 0   When does the patient need to receive the medication? 06/08/23   Refill Delivery Questions    How will the patient receive the medication? MEDRx   When does the patient need to receive the medication? 06/08/23   Shipping Address Home   Address in [facility] confirmed and updated if neccessary? Yes   Expected Copay ($) 0   Is the patient able to afford the medication copay? Yes   Payment Method zero copay   Days supply of Refill 28   Supplies needed? No supplies needed   Refill activity completed? Yes   Refill activity plan Refill scheduled   Shipment/Pickup Date: 06/02/23            Current Outpatient Medications   Medication Sig    albuterol (PROVENTIL/VENTOLIN HFA) 90 mcg/actuation inhaler INHALE 2 PUFFS BY MOUTH EVERY 6 HOURS AS NEEDED FOR WHEEZING. RESCUE INHALER    amlodipine-benazepril 5-10 mg (LOTREL) 5-10 mg per capsule Take 1 capsule by mouth once daily.    atorvastatin (LIPITOR) 10 MG tablet Take 10 mg by mouth once daily.    blood pressure monitor ([facility]) LG arm size (OP) by Other route as needed for High Blood Pressure.    buPROPion (WELLBUTRIN XL) 150 MG TB24 tablet Take 150 mg by mouth once daily.    busPIRone (BUSPAR) 30 MG Tab Take 30 mg by mouth 2 (two) times daily.    butalbital-acetaminophen-caffeine -40 mg (FIORICET, ESGIC) -40 mg per tablet TAKE 1 TABLET BY MOUTH EVERY 6 HOURS AS NEEDED    clonazePAM (KLONOPIN) 2 MG Tab TAKE 1 TABLET(2 MG) BY MOUTH EVERY DAY (Patient taking differently: Take 3 mg by mouth. TAKE 1.5 TABLET(3 MG) BY MOUTH EVERY DAY)    ergocalciferol (ERGOCALCIFEROL) 50,000 unit Cap Take 50,000 Units by mouth every 7 days.    estradiol (ESTRACE) 2 MG tablet TAKE 1 TABLET(2 MG) BY MOUTH EVERY DAY    estradioL (ESTRACE) 2 MG tablet TAKE 1  TABLET BY MOUTH EVERY DAY    famotidine (PEPCID) 40 MG tablet Take 1 tablet (40 mg total) by mouth once daily. Take at same time as epclusa    FLUoxetine 20 MG capsule TAKE 1 CAPSULE(20 MG) BY MOUTH EVERY DAY (Patient taking differently: Take 60 mg by mouth once daily.)    furosemide (LASIX) 20 MG tablet TAKE 1 TABLET(20 MG) BY MOUTH DAILY AS NEEDED    gabapentin (NEURONTIN) 600 MG tablet TAKE 1 TABLET(600 MG) BY MOUTH THREE TIMES DAILY    hepatitis A and B vaccine, PF, (TWINRIX) 720 HANDY unit- 20 mcg/mL Syrg suspension Inject 1mL IM at 0, 1, and 6 months    ibuprofen (ADVIL,MOTRIN) 800 MG tablet TAKE 1 TABLET(800 MG) BY MOUTH THREE TIMES DAILY AS NEEDED    insulin glargine,hum.rec.anlog (LANTUS SUBQ) Inject 40 Units into the skin 2 (two) times a day.    insulin regular 100 unit/mL Inj injection Inject into the skin 3 (three) times daily before meals. Sliding scale, as needed    metFORMIN (GLUCOPHAGE) 1000 MG tablet Take 1,000 mg by mouth 2 (two) times daily with meals.    omeprazole (PRILOSEC) 40 MG capsule Take 1 capsule (40 mg total) by mouth every morning.    OXcarbazepine (TRILEPTAL) 300 MG Tab Take 150 mg by mouth 2 (two) times daily.    oxybutynin (DITROPAN) 5 MG Tab TAKE 1 TABLET(5 MG) BY MOUTH THREE TIMES DAILY    promethazine (PHENERGAN) 25 MG tablet TAKE 1 TABLET(25 MG) BY MOUTH EVERY 6 HOURS AS NEEDED FOR NAUSEA    semaglutide (OZEMPIC) 0.25 mg or 0.5 mg (2 mg/3 mL) PnIj Inject 0.5 mg into the skin every 7 days.    sofosbuvir-velpatasvir 400-100 mg Tab Take 1 tablet by mouth once daily.    sumatriptan (IMITREX) 100 MG tablet Take 1 tablet (100 mg total) by mouth every 2 (two) hours as needed for Migraine (Maximum of 2 doses in a 24 hour period.).    tiZANidine (ZANAFLEX) 4 MG tablet TAKE 1 TABLET BY MOUTH EVERY 8 HOURS AS NEEDED   Last reviewed on 4/10/2023  4:08 PM by Thom Seaman, PharmD    Review of patient's allergies indicates:   Allergen Reactions    Adhesive     Last reviewed on  4/6/2023  2:53 PM by Jennifer B Scheuermann      Tasks added this encounter   No tasks added.   Tasks due within next 3 months   No tasks due.     Ana Jones, PharmD  [facility]  [address]  [facility] [address]  Phone: [phone]  Fax: [phone]

## 2023-07-24 NOTE — TELEPHONE ENCOUNTER
7 Day Touchbase - Documented Responses      Flowsheet Row Most Recent Value   Have you started taking your medication? Yes   What day did you start? 04/13/23   How are you feeling?  Patient is feeling well overall. She was experienincg nausea during first couple of days but it has resolved. She also has been experiencing headaches but it is tolerable after treatment with alternating Fioricet and Ibuprofen. Patient is not cirrhotic nor has cardiac issues so PRN NSAID use is appropriate.   How are you taking your medication? Are you having any problems taking your medication? Patient is taking Epclusa 1 tablet daily with no missed doses since beginning treatment. She confirms taking Pepcid dose at the same time as Epclusa.   Do you have any questions or concerns that we can help you with today? No.          Ana Jones, PharmD  [facility]  [address] [place] [id number]  Phone: [phone]  Fax: [phone]  
Yes

## 2023-10-05 ENCOUNTER — LAB VISIT (OUTPATIENT)
Dept: LAB | Facility: HOSPITAL | Age: 48
End: 2023-10-05
Payer: MEDICAID

## 2023-10-05 DIAGNOSIS — B18.2 CHRONIC HEPATITIS C WITHOUT HEPATIC COMA: ICD-10-CM

## 2023-10-05 LAB
ALBUMIN SERPL BCP-MCNC: 3.6 G/DL (ref 3.5–5.2)
ALP SERPL-CCNC: 127 U/L (ref 55–135)
ALT SERPL W/O P-5'-P-CCNC: 10 U/L (ref 10–44)
AST SERPL-CCNC: 18 U/L (ref 10–40)
BILIRUB DIRECT SERPL-MCNC: 0.2 MG/DL (ref 0.1–0.3)
BILIRUB SERPL-MCNC: 0.6 MG/DL (ref 0.1–1)
PROT SERPL-MCNC: 6.7 G/DL (ref 6–8.4)

## 2023-10-05 PROCEDURE — 36415 COLL VENOUS BLD VENIPUNCTURE: CPT | Mod: PO | Performed by: PHYSICIAN ASSISTANT

## 2023-10-05 PROCEDURE — 80076 HEPATIC FUNCTION PANEL: CPT | Performed by: PHYSICIAN ASSISTANT

## 2023-10-05 PROCEDURE — 87522 HEPATITIS C REVRS TRNSCRPJ: CPT | Performed by: PHYSICIAN ASSISTANT

## 2023-10-06 ENCOUNTER — TELEPHONE (OUTPATIENT)
Dept: HEPATOLOGY | Facility: CLINIC | Age: 48
End: 2023-10-06
Payer: MEDICAID

## 2023-10-06 DIAGNOSIS — Z86.19 HEPATITIS C VIRUS INFECTION CURED AFTER ANTIVIRAL DRUG THERAPY: Primary | ICD-10-CM

## 2023-10-06 LAB
HCV RNA SERPL QL NAA+PROBE: NOT DETECTED
HCV RNA SPEC NAA+PROBE-ACNC: NOT DETECTED IU/ML

## 2023-10-06 NOTE — TELEPHONE ENCOUNTER
Pt began 12 weeks of Epclusa on 4/13/23  Anticipated treatment end date: 7/5/23  Vickie 1a  Treatment naive  Prior treatment - Mavyret (but suspect current infection is new infection)  F0-2      10/5/23  LFT normal  HCV neg    These labs document SVR12 following successful HCV treatment with Epclusa  This is consistent with a cure. Relapse is not expected.   No immunity is conferred and patient could be reinfected.       [facility]    Please schedule   - HCV RNA in 6 months

## 2024-04-04 ENCOUNTER — LAB VISIT (OUTPATIENT)
Dept: LAB | Facility: HOSPITAL | Age: 49
End: 2024-04-04
Payer: MEDICAID

## 2024-04-04 DIAGNOSIS — Z86.19 HEPATITIS C VIRUS INFECTION CURED AFTER ANTIVIRAL DRUG THERAPY: ICD-10-CM

## 2024-04-04 PROCEDURE — 36415 COLL VENOUS BLD VENIPUNCTURE: CPT | Mod: PO | Performed by: PHYSICIAN ASSISTANT

## 2024-04-04 PROCEDURE — 87522 HEPATITIS C REVRS TRNSCRPJ: CPT | Performed by: PHYSICIAN ASSISTANT

## 2024-04-05 LAB
HCV RNA SERPL QL NAA+PROBE: NOT DETECTED
HCV RNA SPEC NAA+PROBE-ACNC: NOT DETECTED IU/ML

## 2024-10-16 PROBLEM — E11.9 DIABETES MELLITUS: Status: ACTIVE | Noted: 2023-06-02

## 2025-06-26 ENCOUNTER — OFFICE VISIT (OUTPATIENT)
Dept: PRIMARY CARE CLINIC | Facility: CLINIC | Age: 50
End: 2025-06-26
Payer: MEDICAID

## 2025-06-26 ENCOUNTER — TELEPHONE (OUTPATIENT)
Dept: GASTROENTEROLOGY | Facility: CLINIC | Age: 50
End: 2025-06-26
Payer: MEDICAID

## 2025-06-26 ENCOUNTER — PATIENT MESSAGE (OUTPATIENT)
Dept: GASTROENTEROLOGY | Facility: CLINIC | Age: 50
End: 2025-06-26
Payer: MEDICAID

## 2025-06-26 ENCOUNTER — PATIENT OUTREACH (OUTPATIENT)
Dept: ADMINISTRATIVE | Facility: OTHER | Age: 50
End: 2025-06-26
Payer: MEDICAID

## 2025-06-26 VITALS
HEIGHT: 61 IN | OXYGEN SATURATION: 99 % | TEMPERATURE: 99 F | DIASTOLIC BLOOD PRESSURE: 66 MMHG | WEIGHT: 234 LBS | SYSTOLIC BLOOD PRESSURE: 104 MMHG | BODY MASS INDEX: 44.18 KG/M2 | HEART RATE: 90 BPM

## 2025-06-26 DIAGNOSIS — I10 PRIMARY HYPERTENSION: Primary | ICD-10-CM

## 2025-06-26 DIAGNOSIS — Z12.11 SCREENING FOR COLON CANCER: ICD-10-CM

## 2025-06-26 DIAGNOSIS — Z79.4 TYPE 2 DIABETES MELLITUS WITH HYPERGLYCEMIA, WITH LONG-TERM CURRENT USE OF INSULIN: ICD-10-CM

## 2025-06-26 DIAGNOSIS — Z13.1 SCREENING FOR DIABETES MELLITUS: ICD-10-CM

## 2025-06-26 DIAGNOSIS — E78.00 PURE HYPERCHOLESTEROLEMIA: ICD-10-CM

## 2025-06-26 DIAGNOSIS — Z12.39 ENCOUNTER FOR SCREENING FOR MALIGNANT NEOPLASM OF BREAST, UNSPECIFIED SCREENING MODALITY: ICD-10-CM

## 2025-06-26 DIAGNOSIS — F31.62 BIPOLAR DISORDER, CURRENT EPISODE MIXED, MODERATE: ICD-10-CM

## 2025-06-26 DIAGNOSIS — N95.1 MENOPAUSAL HOT FLUSHES: ICD-10-CM

## 2025-06-26 DIAGNOSIS — K21.9 GASTROESOPHAGEAL REFLUX DISEASE, UNSPECIFIED WHETHER ESOPHAGITIS PRESENT: ICD-10-CM

## 2025-06-26 DIAGNOSIS — E11.65 TYPE 2 DIABETES MELLITUS WITH HYPERGLYCEMIA, WITH LONG-TERM CURRENT USE OF INSULIN: ICD-10-CM

## 2025-06-26 LAB
ALBUMIN/CREAT UR: NORMAL
CREAT UR-MCNC: 90 MG/DL (ref 15–325)
MICROALBUMIN UR-MCNC: <5 UG/ML (ref ?–5000)

## 2025-06-26 PROCEDURE — 82570 ASSAY OF URINE CREATININE: CPT | Performed by: FAMILY MEDICINE

## 2025-06-26 PROCEDURE — 3078F DIAST BP <80 MM HG: CPT | Mod: CPTII,,, | Performed by: FAMILY MEDICINE

## 2025-06-26 PROCEDURE — 4010F ACE/ARB THERAPY RXD/TAKEN: CPT | Mod: CPTII,,, | Performed by: FAMILY MEDICINE

## 2025-06-26 PROCEDURE — 99999 PR PBB SHADOW E&M-EST. PATIENT-LVL V: CPT | Mod: PBBFAC,,, | Performed by: FAMILY MEDICINE

## 2025-06-26 PROCEDURE — 3008F BODY MASS INDEX DOCD: CPT | Mod: CPTII,,, | Performed by: FAMILY MEDICINE

## 2025-06-26 PROCEDURE — 99204 OFFICE O/P NEW MOD 45 MIN: CPT | Mod: S$PBB,,, | Performed by: FAMILY MEDICINE

## 2025-06-26 PROCEDURE — 3074F SYST BP LT 130 MM HG: CPT | Mod: CPTII,,, | Performed by: FAMILY MEDICINE

## 2025-06-26 PROCEDURE — 1159F MED LIST DOCD IN RCRD: CPT | Mod: CPTII,,, | Performed by: FAMILY MEDICINE

## 2025-06-26 PROCEDURE — 99215 OFFICE O/P EST HI 40 MIN: CPT | Mod: PBBFAC,PN | Performed by: FAMILY MEDICINE

## 2025-06-26 PROCEDURE — G2211 COMPLEX E/M VISIT ADD ON: HCPCS | Mod: ,,, | Performed by: FAMILY MEDICINE

## 2025-06-26 RX ORDER — FENOFIBRATE 160 MG/1
160 TABLET ORAL DAILY
Qty: 90 TABLET | Refills: 1 | Status: SHIPPED | OUTPATIENT
Start: 2025-06-26

## 2025-06-26 RX ORDER — MIRTAZAPINE 7.5 MG/1
7.5 TABLET, FILM COATED ORAL NIGHTLY
COMMUNITY
Start: 2025-04-16

## 2025-06-26 RX ORDER — AMLODIPINE AND BENAZEPRIL HYDROCHLORIDE 5; 10 MG/1; MG/1
1 CAPSULE ORAL DAILY
Qty: 90 CAPSULE | Refills: 1 | Status: SHIPPED | OUTPATIENT
Start: 2025-06-26

## 2025-06-26 RX ORDER — POTASSIUM CHLORIDE 750 MG/1
1 TABLET, EXTENDED RELEASE ORAL EVERY MORNING
COMMUNITY
End: 2025-06-26

## 2025-06-26 RX ORDER — FENOFIBRATE 160 MG/1
160 TABLET ORAL
COMMUNITY
Start: 2025-06-08 | End: 2025-06-26 | Stop reason: SDUPTHER

## 2025-06-26 RX ORDER — FUROSEMIDE 20 MG/1
1 TABLET ORAL EVERY MORNING
COMMUNITY
End: 2025-06-26

## 2025-06-26 RX ORDER — VELPATASVIR AND SOFOSBUVIR 100; 400 MG/1; MG/1
TABLET, FILM COATED ORAL
COMMUNITY
End: 2025-06-26

## 2025-06-26 RX ORDER — TOPIRAMATE 100 MG/1
100 TABLET, FILM COATED ORAL NIGHTLY
COMMUNITY
Start: 2025-06-15

## 2025-06-26 RX ORDER — OMEPRAZOLE 40 MG/1
40 CAPSULE, DELAYED RELEASE ORAL EVERY MORNING
Qty: 90 CAPSULE | Refills: 0 | Status: SHIPPED | OUTPATIENT
Start: 2025-06-26

## 2025-06-26 RX ORDER — VENLAFAXINE HYDROCHLORIDE 150 MG/1
150 CAPSULE, EXTENDED RELEASE ORAL
COMMUNITY
Start: 2025-04-21

## 2025-06-26 RX ORDER — ROSUVASTATIN CALCIUM 10 MG/1
10 TABLET, COATED ORAL DAILY
Qty: 90 TABLET | Refills: 1 | Status: SHIPPED | OUTPATIENT
Start: 2025-06-26

## 2025-06-26 RX ORDER — ROSUVASTATIN CALCIUM 10 MG/1
10 TABLET, COATED ORAL
COMMUNITY
Start: 2025-04-08 | End: 2025-06-26 | Stop reason: SDUPTHER

## 2025-06-26 RX ORDER — METFORMIN HYDROCHLORIDE 1000 MG/1
1000 TABLET ORAL 2 TIMES DAILY WITH MEALS
Qty: 180 TABLET | Refills: 1 | Status: SHIPPED | OUTPATIENT
Start: 2025-06-26

## 2025-06-26 RX ORDER — CARIPRAZINE 3 MG/1
3 CAPSULE, GELATIN COATED ORAL
COMMUNITY
Start: 2025-06-03

## 2025-06-26 RX ORDER — CLONIDINE HYDROCHLORIDE 0.1 MG/1
0.1 TABLET ORAL 2 TIMES DAILY
COMMUNITY
Start: 2025-04-11

## 2025-06-26 RX ORDER — ESTRADIOL 2 MG/1
2 TABLET ORAL DAILY
Qty: 90 TABLET | Refills: 1 | Status: SHIPPED | OUTPATIENT
Start: 2025-06-26

## 2025-06-26 RX ORDER — BENZTROPINE MESYLATE 1 MG/1
1 TABLET ORAL NIGHTLY
COMMUNITY
Start: 2025-06-09

## 2025-06-26 RX ORDER — SEMAGLUTIDE 2.68 MG/ML
2 INJECTION, SOLUTION SUBCUTANEOUS
Qty: 3 ML | Refills: 11 | Status: SHIPPED | OUTPATIENT
Start: 2025-06-26 | End: 2026-06-26

## 2025-06-26 RX ORDER — OLANZAPINE 20 MG/1
20 TABLET, FILM COATED ORAL NIGHTLY
COMMUNITY

## 2025-06-26 RX ORDER — ALLOPURINOL 300 MG/1
300 TABLET ORAL
COMMUNITY
Start: 2025-06-11 | End: 2025-06-26

## 2025-06-26 NOTE — PROGRESS NOTES
CHW - Initial Contact    This Community Health Worker  updated the Social Determinant of Health questionnaire with patient during clinic visit today.    Pt identified barriers of most importance are: NONE   Referrals to community agencies completed with patient/caregiver consent outside of [facility] include: NONE  Referrals were put through [facility] - : NO  Support and Services: No support & services have been documented.  Other information discussed the patient needs / wants help with: NONE   Follow up required: NO  No future outreach task assigned

## 2025-06-26 NOTE — PROGRESS NOTES
Subjective:       Patient ID: Esther Gage is a 49 y.o. female.    Chief Complaint: Establish Care (Medication refill )    HPI  History of Present Illness    CHIEF COMPLAINT:  Patient presents today to establish care after her previous doctor stopped taking her insurance.    BREAST LUMP:  She reports a breast lump previously noted, approximately the size of a large grape, which has intermittently been palpable but currently is not consistently detectable. The lump has mostly disappeared since initially being discovered. She denies any prior history of abnormal mammograms and reports being up to date with routine mammographic screening.    DIABETES:  She has had diabetes for two years, currently managed with Metformin 1000mg twice daily and Ozempic 2mg weekly. She self-discontinued insulin therapy. Last A1C was 5.0 from [facility]. She experiences diabetic neuropathy in her feet managed with current medications and appears stable and well-controlled.    WEIGHT MANAGEMENT:  She initially lost 45 lbs on Ozempic over approximately one year but recently gained back 15 lbs attributed to resuming soda consumption. She acknowledges the need to discontinue soda intake to resume weight loss progress.    BIPOLAR DISORDER:  She has bipolar disorder, currently unsure of specific type. She sees a psychiatrist at [facility]. Current psychiatric medications include Effexor, Vraylar, olanzapine 20mg, Clonidine, Buspar 30mg twice daily, Remeron 7.5mg, Gabapentin, and Topamax.    HEADACHES:  She experiences tension headaches once or twice per week, primarily localized in the neck region, which is significantly improved compared to previous periods. She manages headaches with ibuprofen 800mg as needed and tizanidine as needed for tension headaches, though she is currently out of tizanidine and has only 3-4 ibuprofen tablets remaining. She uses Sumatriptan only for rare migraine occurrences.    GERD:  She has ongoing  reflux symptoms significantly impacting her eating habits. She actively avoids dietary triggers including red sauces, fruit juice, and alcohol. She uses omeprazole 40mg for severe symptom. She has implemented lifestyle modifications such as avoiding eating after 6-7 PM to help control symptoms.    HOT FLASHES:  She continues to experience mild hot flashes. She attempted to discontinue estradiol due to medication burden but noted recurrence of hot flashes, which prompted resumption of the medication.    HYPERTENSION AND HYPERLIPIDEMIA:  She takes Lotrel (amlodipine/benazepril) 5/10 for blood pressure and fenofibrate for cholesterol and triglycerides. She has been out of rosuvastatin (Crestor) for a few weeks.    GOUT:  She was previously diagnosed with gout based on elevated uric acid levels but denies ever experiencing a gout attack. She was prescribed Allopurinol 75mg, which she took for one week and then discontinued. She is currently not taking any medication for gout management.    MEDICAL HISTORY:  She has a history of stomach ulcers approximately 10-15 years ago confirmed by endoscopic procedures with multiple scopes performed at that time. The   ulcers were not bleeding or associated with alcohol consumption. She completed Hepatitis C treatment approximately one year ago with successful clearance of the infection, describing the treatment process as easy without significant medical issues.    SURGICAL HISTORY:  She underwent hysterectomy at age 29 following prolonged postpartum bleeding that persisted for over one year. The procedure was not performed due to malignancy.    MEDICATIONS:  Current medications include estradiol for menopause management post-hysterectomy and vitamin D weekly.    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 She currently resides in a sober living house.  "       Review of Systems    Objective:      Vitals:    06/26/25 0948   BP: 104/66   BP Location: Right arm   Patient Position: Sitting   Pulse: 90   Temp: 98.5 °F (36.9 °C)   TempSrc: Oral   SpO2: 99%   Weight: 106.1 kg (234 lb)   Height: 5' 1" (1.549 m)     Physical Exam  Vitals and nursing note reviewed.   Constitutional:       Appearance: She is well-developed. She is obese.   HENT:      Head: Normocephalic and atraumatic.      Nose: Nose normal.   Eyes:      Conjunctiva/sclera: Conjunctivae normal.   Cardiovascular:      Rate and Rhythm: Normal rate and regular rhythm.      Heart sounds: Normal heart sounds.   Pulmonary:      Effort: Pulmonary effort is normal. No respiratory distress.      Breath sounds: Normal breath sounds. No wheezing or rales.   Abdominal:      General: There is no distension.      Palpations: Abdomen is soft.      Tenderness: There is no abdominal tenderness.   Neurological:      Mental Status: She is alert.      Cranial Nerves: No cranial nerve deficit.   Psychiatric:         Mood and Affect: Mood normal.         Physical Exam               Lab Results   Component Value Date     03/07/2023    K 4.4 03/07/2023     03/07/2023    CO2 24 03/07/2023    BUN 11 03/07/2023    CREATININE 0.7 03/07/2023    CREATININE 0.7 05/06/2013    GLUCOSE 104 05/06/2013    ANIONGAP 10 03/07/2023    ANIONGAP 13 05/06/2013     Lab Results   Component Value Date    HGBA1C 5.0 08/13/2014     No results found for: "BNP", "BNPTRIAGEBLO"    Lab Results   Component Value Date    WBC 8.18 03/07/2023    HGB 10.1 (L) 03/07/2023    HGB 11.4 (L) 05/06/2013    HCT 31.7 (L) 03/07/2023     03/07/2023    GRAN 6.3 03/07/2023    GRAN 76.3 (H) 03/07/2023     Lab Results   Component Value Date    CHOL 220 (H) 11/06/2017    HDL 61 11/06/2017    LDLCALC 132.0 11/06/2017    TRIG 135 11/06/2017        Current Medications[1]        Assessment:       1. Primary hypertension    2. Gastroesophageal reflux disease, " unspecified whether esophagitis present    3. Type 2 diabetes mellitus with hyperglycemia, with long-term current use of insulin    4. Pure hypercholesterolemia    5. Menopausal hot flushes    6. Screening for diabetes mellitus    7. Screening for colon cancer    8. Encounter for screening for malignant neoplasm of breast, unspecified screening modality    9. Bipolar disorder, current episode mixed, moderate           Plan:       Primary hypertension  -     amlodipine-benazepril 5-10 mg (LOTREL) 5-10 mg per capsule; Take 1 capsule by mouth once daily.  Dispense: 90 capsule; Refill: 1  Normotensive today. Refill on amlodipine-benzapril 5-10 mg.       Gastroesophageal reflux disease, unspecified whether esophagitis present  -     omeprazole (PRILOSEC) 40 MG capsule; Take 1 capsule (40 mg total) by mouth every morning.  Dispense: 90 capsule; Refill: 0  Discussed alternative tums and pepcid which she is unaware of but per chart had been at least prescribed pepcid in the past. Hx of stomach ulcers and denies any symptoms presently.     Type 2 diabetes mellitus with hyperglycemia, with long-term current use of insulin  -     metFORMIN (GLUCOPHAGE) 1000 MG tablet; Take 1 tablet (1,000 mg total) by mouth 2 (two) times daily with meals.  Dispense: 180 tablet; Refill: 1  -     rosuvastatin (CRESTOR) 10 MG tablet; Take 1 tablet (10 mg total) by mouth once daily.  Dispense: 90 tablet; Refill: 1  -     semaglutide (OZEMPIC) 2 mg/dose (8 mg/3 mL) PnIj; Inject 2 mg into the skin every 7 days.  Dispense: 3 mL; Refill: 11  -     Microalbumin/Creatinine Ratio, Urine  -     Hemoglobin A1C; Future; Expected date: 06/26/2025  -     Comprehensive Metabolic Panel; Future; Expected date: 06/26/2025  Well controlled with last A1C at goal. Will recheck A1C. Among other yearly labs. Refill on ozempic 2 mg weekly. Discussed diet with recent weight gain attributed to frequent soda use.     Pure hypercholesterolemia  -     fenofibrate 160 MG  Tab; Take 1 tablet (160 mg total) by mouth once daily.  Dispense: 90 tablet; Refill: 1  -     Lipid Panel; Future; Expected date: 06/26/2025    Menopausal hot flushes  -     estradioL (ESTRACE) 2 MG tablet; Take 1 tablet (2 mg total) by mouth once daily.  Dispense: 90 tablet; Refill: 1  Has failed self wean with resumption of symptoms. Refill today. No known fam hx of breast cancer.    Breast cancer screening  -     Mammo Digital Screening Bilat w/ Billy (XPD); Future; Expected date: 06/26/2025  Hx of palpable breast mass that is no longer present. Due for screening regardless. Referred for mammogram.     Screening for colon cancer  -     Ambulatory referral/consult to Endo Procedure ; Future; Expected date: 06/27/2025  No fam hx of colon cancer. Discussed options and agreeable to colonoscopy. Referred today.      Bipolar: unsure of type. All psychiatry meds filled by psych provider at [facility]. Feels well managed at this time.           This note was generated with the assistance of ambient listening technology. Verbal consent was obtained by the patient and accompanying visitor(s) for the recording of patient appointment to facilitate this note. I attest to having reviewed and edited the generated note for accuracy, though some syntax or spelling errors may persist. Please contact the author of this note for any clarification.            [1]   Current Outpatient Medications:     albuterol (PROVENTIL/VENTOLIN HFA) 90 mcg/actuation inhaler, INHALE 2 PUFFS BY MOUTH EVERY 6 HOURS AS NEEDED FOR WHEEZING. RESCUE INHALER, Disp: 8.5 g, Rfl: 1    benztropine (COGENTIN) 1 MG tablet, Take 1 mg by mouth every evening., Disp: , Rfl:     blood pressure monitor ([facility] EASE) LG arm size (OP), by Other route as needed for High Blood Pressure., Disp: 1 each, Rfl: 0    busPIRone (BUSPAR) 30 MG Tab, Take 30 mg by mouth 2 (two) times daily., Disp: , Rfl:     cloNIDine (CATAPRES) 0.1 MG tablet, Take 0.1 mg by mouth 2 (two) times  daily., Disp: , Rfl:     ergocalciferol (ERGOCALCIFEROL) 50,000 unit Cap, Take 50,000 Units by mouth every 7 days., Disp: , Rfl:     gabapentin (NEURONTIN) 600 MG tablet, TAKE 1 TABLET(600 MG) BY MOUTH THREE TIMES DAILY, Disp: 90 tablet, Rfl: 3    hepatitis A and B vaccine, PF, (TWINRIX) 720 HANDY unit- 20 mcg/mL Syrg suspension, Inject 1mL IM at 0, 1, and 6 months, Disp: 1 mL, Rfl: 2    ibuprofen (ADVIL,MOTRIN) 800 MG tablet, TAKE 1 TABLET(800 MG) BY MOUTH THREE TIMES DAILY AS NEEDED, Disp: 90 tablet, Rfl: 0    mirtazapine (REMERON) 7.5 MG Tab, Take 7.5 mg by mouth every evening., Disp: , Rfl:     OLANZapine (ZYPREXA) 20 MG tablet, Take 20 mg by mouth every evening., Disp: , Rfl:     sumatriptan (IMITREX) 100 MG tablet, Take 1 tablet (100 mg total) by mouth every 2 (two) hours as needed for Migraine (Maximum of 2 doses in a 24 hour period.)., Disp: 20 tablet, Rfl: 3    tiZANidine (ZANAFLEX) 4 MG tablet, TAKE 1 TABLET BY MOUTH EVERY 8 HOURS AS NEEDED, Disp: 90 tablet, Rfl: 3    topiramate (TOPAMAX) 100 MG tablet, Take 100 mg by mouth every evening., Disp: , Rfl:     venlafaxine (EFFEXOR-XR) 150 MG Cp24, Take 150 mg by mouth., Disp: , Rfl:     VRAYLAR 3 mg Cap, Take 3 mg by mouth., Disp: , Rfl:     amlodipine-benazepril 5-10 mg (LOTREL) 5-10 mg per capsule, Take 1 capsule by mouth once daily., Disp: 90 capsule, Rfl: 1    estradioL (ESTRACE) 2 MG tablet, Take 1 tablet (2 mg total) by mouth once daily., Disp: 90 tablet, Rfl: 1    fenofibrate 160 MG Tab, Take 1 tablet (160 mg total) by mouth once daily., Disp: 90 tablet, Rfl: 1    metFORMIN (GLUCOPHAGE) 1000 MG tablet, Take 1 tablet (1,000 mg total) by mouth 2 (two) times daily with meals., Disp: 180 tablet, Rfl: 1    omeprazole (PRILOSEC) 40 MG capsule, Take 1 capsule (40 mg total) by mouth every morning., Disp: 90 capsule, Rfl: 0    rosuvastatin (CRESTOR) 10 MG tablet, Take 1 tablet (10 mg total) by mouth once daily., Disp: 90 tablet, Rfl: 1    semaglutide (OZEMPIC) 2  mg/dose (8 mg/3 mL) PnIj, Inject 2 mg into the skin every 7 days., Disp: 3 mL, Rfl: 11

## 2025-06-27 ENCOUNTER — PATIENT MESSAGE (OUTPATIENT)
Dept: GASTROENTEROLOGY | Facility: CLINIC | Age: 50
End: 2025-06-27
Payer: MEDICAID

## 2025-07-02 ENCOUNTER — TELEPHONE (OUTPATIENT)
Dept: GASTROENTEROLOGY | Facility: CLINIC | Age: 50
End: 2025-07-02
Payer: MEDICAID

## 2025-07-02 DIAGNOSIS — Z12.11 SCREEN FOR COLON CANCER: Primary | ICD-10-CM

## 2025-07-02 NOTE — TELEPHONE ENCOUNTER
Endoscopy Scheduling Questionnaire    Are you taking any blood thinners? no      2.  Are you taking a GLP-1 Agonist? yes     If yes, name of GLP-  OZEMPIC      Instructed pt to hold this medication 7 days prior to procedure. Last dose will be 8/8/2025    3.  Are you taking Adipex? no      4.  Are you on dialysis or have Kidney Disease? no    5.  Have you been diagnosed with Diverticulitis in the past three months? no    6.  Are you a diabetic? yes    7. Implantable Devices? no      8.  Do you have a history of constipation? no    9.  Previous Colonoscopy? no     If yes, did you have polyps? no     What prep did you use? N/A     How did it work? N/A    10.  Family history of colon cancer? no       Prep instructions sent to patient via My Chart    Prep - SUPREP    Prep RX sent to [facility]        Procedure scheduled with Dr. Fletcher  on  8/18/2025

## 2025-07-02 NOTE — TELEPHONE ENCOUNTER
IMPORTANT INFORMATION TO KNOW BEFORE YOUR PROCEDURE    [facility] 2nd Floor   ARRIVE ON 8/18/2025 AT 12:30 PM      If your procedure requires the administration of anesthesia, it is necessary for a responsible adult to drive you home. (Medical Transportation, Uber, Lyft, Taxi, etc. may ONLY be used if a responsible adult is present to accompany you home.  The responsible adult CAN'T be the  of the service).      person must be available to return to pick you up within 15 minutes of being notified of discharge.       Please bring a picture ID, insurance card, & copayment      Take Medications as directed below:    If you are taking any injectable medication (s) for weight loss and/or diabetes  weekly, hold for 8 days prior to your scheduled procedure, please stop taking Ozempic (Semaglutide) on 8/8/2025. After the procedure, your provider will inform you  of when to resume injection.      If you begin taking any blood thinning medications or injectable weight loss/diabetes medications (other than insulin) , please contact the endoscopy scheduling department listed below as soon as possible.    If you are diabetic see the attached instruction sheet regarding your medication.     If you take HEART, BLOOD PRESSURE, SEIZURE, PAIN, LUNG (including inhalers/nebulizers), ANTI-REJECTION (transplant patients), or PSYCHIATRIC medications, please take at your regular times with a sip of water or as directed by the scheduling nurse.     Important contact information:    Endoscopy [phone] Hours of operation Monday-Friday 8:00-4:30pm.    Questions about insurance or financial obligations call [phone] or [phone].    If you have questions regarding the prep or need to reschedule, please call [phone]. After hours questions requiring immediate assistance, contact [facility] nurse line at [phone] or [phone].   NOTE:     On occasion, unforeseen  circumstances may cause a delay in your procedure start time. We respect your time and appreciate your patience during these circumstances.    Colonoscopy Procedure Prep Instructions        Date of procedure: 8/18/2025 with     Location of Department:   [facility] [address] [address]   Take the Elevators to 2nd Floor Endoscopy Procedural Area      As soon as possible:   your prep from pharmacy and over the counter DULCOLAX LAXATIVE TABLETS     What You Can do:  You may have clear liquids ONLY-see below for list.     Liquids That Are OK to Drink:   Water  Sports drinks (Gatorade, Power-Aid)  Coffee or tea (no cream or nondairy creamer)  Clear juices without pulp (apple, white grape)  Gelatin desserts (no fruit or toppings)  Clear soda (sprite, coke, ginger ale)  Chicken broth (until 12 midnight the night before procedure)    What You CANNOT do:   Do not EAT solid food, drink milk or anything   colored red.  Do not drink alcohol.  Do not take oral medications within 1 hour of starting each dose of SUPREP.  No gum chewing or candy morning of procedure.    Note:   You will be picking up (KIT) from the pharmacy.   Please disregard the insert instructions from pharmacy).  SUPREP Bowel Prep Kit is indicated for cleansing of the colon as a preparation for colonoscopy in adults.   Be sure to tell your doctor about all the medicines you take, including prescription and non-prescription medicines, vitamins, and herbal supplements. SUPREP Bowel Prep Kit may affect how other medicines work.  Medication taken by mouth may not be absorbed properly when taken within 1 hour before the start of each dose of SUPREP Bowel Prep Kit.  It is not uncommon to experience some abdominal cramping, nausea and/or vomiting when taking the prep. If you have nausea and/or vomiting while taking the prep, stop drinking for 20 to 30 minutes then continue.      How to take prep:   DOSE 1--Day Before  Colonoscopy    Drink at least 6 to 8 glasses of clear liquids from time you wake up until you begin your prep and then continue until bedtime to avoid dehydration.     12:00 pm (NOON) Take four (4) Dulcolax (Bisacodyl) tablets with at least 8 oz. or more of clear liquids.    At 6:00 pm:  You must complete Steps 1 through 4 using one (1)   6-ounce bottle before going to bed as shown below:    Step 1-Pour ONE (1) 6-ounce bottle of SUPREP liquid into the mixing container  Step 2-Add cool drinking water to the 16-ounce line on the container and mix  Step 3-Drink ALL the liquid in the container.  Step 4-You must drink two (2) more 16-ounce containers of water over the next 1 hour    DOSE 2--Day of the Colonoscopy at 2-3 AM.    For this dose, repeat Steps 1 through 4 shown above using one (1) 6-ounce bottle.     You may continue drinking water/clear liquids until   4 hours before your colonoscopy or as directed by the scheduling nurse.      For information about your procedure, two (2) things to view prior to colonoscopy:  Please watch this informational video. It is important to watch this animated consent video prior to your arrival. If you haven't watched the video prior to arriving, you are required to watch it during admission which can causes delays.    Options for viewing:   Using a keyboard:  press and hold the control tab (Ctrl) and left mouse click to follow links.           Colonoscopy Instructional Video                                                                                   OR    Type link address into your web browser's address bar:  [email or web address]      Educational Booklet with pictures:      Colonoscopy Prep - Liquid

## 2025-07-07 RX ORDER — SODIUM, POTASSIUM,MAG SULFATES 17.5-3.13G
1 SOLUTION, RECONSTITUTED, ORAL ORAL DAILY
Qty: 1 KIT | Refills: 0 | Status: SHIPPED | OUTPATIENT
Start: 2025-07-07 | End: 2025-07-09

## 2025-07-15 ENCOUNTER — HOSPITAL ENCOUNTER (OUTPATIENT)
Dept: RADIOLOGY | Facility: HOSPITAL | Age: 50
Discharge: HOME | End: 2025-07-15
Attending: FAMILY MEDICINE
Payer: MEDICAID

## 2025-07-15 DIAGNOSIS — Z12.39 ENCOUNTER FOR SCREENING FOR MALIGNANT NEOPLASM OF BREAST, UNSPECIFIED SCREENING MODALITY: ICD-10-CM

## 2025-07-15 PROCEDURE — 77063 BREAST TOMOSYNTHESIS BI: CPT | Mod: TC,PO

## 2025-07-16 ENCOUNTER — PATIENT MESSAGE (OUTPATIENT)
Dept: PRIMARY CARE CLINIC | Facility: CLINIC | Age: 50
End: 2025-07-16
Payer: MEDICAID

## 2025-07-21 DIAGNOSIS — K21.9 GASTROESOPHAGEAL REFLUX DISEASE, UNSPECIFIED WHETHER ESOPHAGITIS PRESENT: ICD-10-CM

## 2025-07-21 RX ORDER — OMEPRAZOLE 40 MG/1
40 CAPSULE, DELAYED RELEASE ORAL EVERY MORNING
Qty: 90 CAPSULE | Refills: 0 | Status: SHIPPED | OUTPATIENT
Start: 2025-07-21

## 2025-08-14 ENCOUNTER — TELEPHONE (OUTPATIENT)
Dept: ENDOSCOPY | Facility: HOSPITAL | Age: 50
End: 2025-08-14
Payer: MEDICARE

## 2025-08-18 ENCOUNTER — TELEPHONE (OUTPATIENT)
Dept: PRIMARY CARE CLINIC | Facility: CLINIC | Age: 50
End: 2025-08-18
Payer: MEDICARE

## 2025-08-19 ENCOUNTER — TELEPHONE (OUTPATIENT)
Dept: PRIMARY CARE CLINIC | Facility: CLINIC | Age: 50
End: 2025-08-19
Payer: MEDICARE